# Patient Record
Sex: FEMALE | Race: WHITE | NOT HISPANIC OR LATINO
[De-identification: names, ages, dates, MRNs, and addresses within clinical notes are randomized per-mention and may not be internally consistent; named-entity substitution may affect disease eponyms.]

---

## 2017-03-29 ENCOUNTER — APPOINTMENT (OUTPATIENT)
Dept: SURGICAL ONCOLOGY | Facility: CLINIC | Age: 77
End: 2017-03-29

## 2017-03-29 VITALS
DIASTOLIC BLOOD PRESSURE: 81 MMHG | HEIGHT: 66 IN | SYSTOLIC BLOOD PRESSURE: 130 MMHG | WEIGHT: 134 LBS | RESPIRATION RATE: 14 BRPM | HEART RATE: 77 BPM | BODY MASS INDEX: 21.53 KG/M2

## 2017-03-29 DIAGNOSIS — Z97.3 PRESENCE OF SPECTACLES AND CONTACT LENSES: ICD-10-CM

## 2017-03-29 DIAGNOSIS — Z83.2 FAMILY HISTORY OF DISEASES OF THE BLOOD AND BLOOD-FORMING ORGANS AND CERTAIN DISORDERS INVOLVING THE IMMUNE MECHANISM: ICD-10-CM

## 2017-03-29 DIAGNOSIS — Z85.828 PERSONAL HISTORY OF OTHER MALIGNANT NEOPLASM OF SKIN: ICD-10-CM

## 2017-03-29 DIAGNOSIS — Z80.42 FAMILY HISTORY OF MALIGNANT NEOPLASM OF PROSTATE: ICD-10-CM

## 2017-03-29 DIAGNOSIS — K31.9 DISEASE OF STOMACH AND DUODENUM, UNSPECIFIED: ICD-10-CM

## 2017-03-29 DIAGNOSIS — R59.9 ENLARGED LYMPH NODES, UNSPECIFIED: ICD-10-CM

## 2017-04-11 ENCOUNTER — OUTPATIENT (OUTPATIENT)
Dept: OUTPATIENT SERVICES | Facility: HOSPITAL | Age: 77
LOS: 1 days | End: 2017-04-11
Payer: MEDICARE

## 2017-04-11 VITALS
TEMPERATURE: 98 F | SYSTOLIC BLOOD PRESSURE: 112 MMHG | OXYGEN SATURATION: 98 % | HEART RATE: 82 BPM | RESPIRATION RATE: 14 BRPM | HEIGHT: 66 IN | DIASTOLIC BLOOD PRESSURE: 60 MMHG | WEIGHT: 132.94 LBS

## 2017-04-11 DIAGNOSIS — D27.9 BENIGN NEOPLASM OF UNSPECIFIED OVARY: Chronic | ICD-10-CM

## 2017-04-11 DIAGNOSIS — R19.00 INTRA-ABDOMINAL AND PELVIC SWELLING, MASS AND LUMP, UNSPECIFIED SITE: ICD-10-CM

## 2017-04-11 DIAGNOSIS — Z86.018 PERSONAL HISTORY OF OTHER BENIGN NEOPLASM: Chronic | ICD-10-CM

## 2017-04-11 DIAGNOSIS — D68.51 ACTIVATED PROTEIN C RESISTANCE: ICD-10-CM

## 2017-04-11 LAB
BLD GP AB SCN SERPL QL: NEGATIVE — SIGNIFICANT CHANGE UP
BUN SERPL-MCNC: 28 MG/DL — HIGH (ref 7–23)
CALCIUM SERPL-MCNC: 9.7 MG/DL — SIGNIFICANT CHANGE UP (ref 8.4–10.5)
CHLORIDE SERPL-SCNC: 103 MMOL/L — SIGNIFICANT CHANGE UP (ref 98–107)
CO2 SERPL-SCNC: 27 MMOL/L — SIGNIFICANT CHANGE UP (ref 22–31)
CREAT SERPL-MCNC: 1.04 MG/DL — SIGNIFICANT CHANGE UP (ref 0.5–1.3)
GLUCOSE SERPL-MCNC: 88 MG/DL — SIGNIFICANT CHANGE UP (ref 70–99)
HCT VFR BLD CALC: 36.4 % — SIGNIFICANT CHANGE UP (ref 34.5–45)
HGB BLD-MCNC: 11.5 G/DL — SIGNIFICANT CHANGE UP (ref 11.5–15.5)
MCHC RBC-ENTMCNC: 29.9 PG — SIGNIFICANT CHANGE UP (ref 27–34)
MCHC RBC-ENTMCNC: 31.6 % — LOW (ref 32–36)
MCV RBC AUTO: 94.8 FL — SIGNIFICANT CHANGE UP (ref 80–100)
PLATELET # BLD AUTO: 384 K/UL — SIGNIFICANT CHANGE UP (ref 150–400)
PMV BLD: 9.2 FL — SIGNIFICANT CHANGE UP (ref 7–13)
POTASSIUM SERPL-MCNC: 4.2 MMOL/L — SIGNIFICANT CHANGE UP (ref 3.5–5.3)
POTASSIUM SERPL-SCNC: 4.2 MMOL/L — SIGNIFICANT CHANGE UP (ref 3.5–5.3)
RBC # BLD: 3.84 M/UL — SIGNIFICANT CHANGE UP (ref 3.8–5.2)
RBC # FLD: 13.4 % — SIGNIFICANT CHANGE UP (ref 10.3–14.5)
RH IG SCN BLD-IMP: POSITIVE — SIGNIFICANT CHANGE UP
SODIUM SERPL-SCNC: 144 MMOL/L — SIGNIFICANT CHANGE UP (ref 135–145)
WBC # BLD: 8.47 K/UL — SIGNIFICANT CHANGE UP (ref 3.8–10.5)
WBC # FLD AUTO: 8.47 K/UL — SIGNIFICANT CHANGE UP (ref 3.8–10.5)

## 2017-04-11 PROCEDURE — 93010 ELECTROCARDIOGRAM REPORT: CPT

## 2017-04-11 RX ORDER — SODIUM CHLORIDE 9 MG/ML
1000 INJECTION, SOLUTION INTRAVENOUS
Qty: 0 | Refills: 0 | Status: DISCONTINUED | OUTPATIENT
Start: 2017-04-24 | End: 2017-04-24

## 2017-04-11 RX ORDER — SODIUM CHLORIDE 9 MG/ML
3 INJECTION INTRAMUSCULAR; INTRAVENOUS; SUBCUTANEOUS EVERY 8 HOURS
Qty: 0 | Refills: 0 | Status: DISCONTINUED | OUTPATIENT
Start: 2017-04-24 | End: 2017-04-27

## 2017-04-11 NOTE — H&P PST ADULT - NEGATIVE ENMT SYMPTOMS
no dysphagia/no vertigo/no sinus symptoms/no hearing difficulty/no ear pain/no throat pain/no tinnitus

## 2017-04-11 NOTE — H&P PST ADULT - MALLAMPATI CLASS
with phonation Class III - visualization of the soft palate and the base of the uvula/with phonation

## 2017-04-11 NOTE — H&P PST ADULT - NSANTHOSAYNRD_GEN_A_CORE
No. DORA screening performed.  STOP BANG Legend: 0-2 = LOW Risk; 3-4 = INTERMEDIATE Risk; 5-8 = HIGH Risk

## 2017-04-11 NOTE — H&P PST ADULT - PROBLEM SELECTOR PLAN 1
Exploratory Laparotomy, Resection of Abdominal Mass scheduled on 4/24/17.  Pre-op instructions provided. Pt verbalized understanding.   Pepcid provided for GI prophylaxis.   Chlorhexidine wash provided and instructions given.

## 2017-04-11 NOTE — H&P PST ADULT - PSH
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

## 2017-04-11 NOTE — H&P PST ADULT - OTHER CARE PROVIDERS
Dr. Campos Ricks (heme/onc), Dr. Felder (vascular) Dr. Campos Ricks (heme/onc), Dr. Felder (vascular) [phone]

## 2017-04-11 NOTE — H&P PST ADULT - NEGATIVE MUSCULOSKELETAL SYMPTOMS
no muscle weakness/no stiffness/no muscle cramps/no myalgia/no neck pain/no arthritis/no back pain/no joint swelling

## 2017-04-11 NOTE — H&P PST ADULT - MUSCULOSKELETAL
details… detailed exam no calf tenderness/no joint erythema/no joint swelling/normal strength/ROM intact/no joint warmth

## 2017-04-11 NOTE — H&P PST ADULT - HISTORY OF PRESENT ILLNESS
76 year old female with urinary tract infection in 2/2017 with c/o back pain. Pt had workup including sonogram and CT scan in 3/2017 which revealed intraabdominal mass. Pt presents today for presurgical evaluation for .... 76 year old female with urinary tract infection in 2/2017 with c/o back pain. Pt had workup including sonogram and CT scan in 3/2017 which revealed intraabdominal mass. Incidentally they also found a clot in the left pelvic vein and pt was referred to vascular doctor for evaluation. Pt presents today for presurgical evaluation for Exploratory Laparotomy, Resection of Abdominal Mass scheduled on 4/24/17.

## 2017-04-13 ENCOUNTER — APPOINTMENT (OUTPATIENT)
Dept: MRI IMAGING | Facility: CLINIC | Age: 77
End: 2017-04-13

## 2017-04-13 ENCOUNTER — OUTPATIENT (OUTPATIENT)
Dept: OUTPATIENT SERVICES | Facility: HOSPITAL | Age: 77
LOS: 1 days | End: 2017-04-13
Payer: MEDICARE

## 2017-04-13 DIAGNOSIS — Z86.018 PERSONAL HISTORY OF OTHER BENIGN NEOPLASM: Chronic | ICD-10-CM

## 2017-04-13 DIAGNOSIS — Z00.8 ENCOUNTER FOR OTHER GENERAL EXAMINATION: ICD-10-CM

## 2017-04-13 DIAGNOSIS — R59.9 ENLARGED LYMPH NODES, UNSPECIFIED: ICD-10-CM

## 2017-04-13 DIAGNOSIS — D27.9 BENIGN NEOPLASM OF UNSPECIFIED OVARY: Chronic | ICD-10-CM

## 2017-04-13 PROCEDURE — 72197 MRI PELVIS W/O & W/DYE: CPT

## 2017-04-13 PROCEDURE — 74183 MRI ABD W/O CNTR FLWD CNTR: CPT

## 2017-04-13 PROCEDURE — 74183 MRI ABD W/O CNTR FLWD CNTR: CPT | Mod: 26

## 2017-04-13 PROCEDURE — 82565 ASSAY OF CREATININE: CPT

## 2017-04-13 PROCEDURE — 72197 MRI PELVIS W/O & W/DYE: CPT | Mod: 26

## 2017-04-13 PROCEDURE — A9585: CPT

## 2017-04-23 ENCOUNTER — RESULT REVIEW (OUTPATIENT)
Age: 77
End: 2017-04-23

## 2017-04-24 ENCOUNTER — APPOINTMENT (OUTPATIENT)
Dept: SURGICAL ONCOLOGY | Facility: HOSPITAL | Age: 77
End: 2017-04-24

## 2017-04-24 ENCOUNTER — RESULT REVIEW (OUTPATIENT)
Age: 77
End: 2017-04-24

## 2017-04-24 ENCOUNTER — INPATIENT (INPATIENT)
Facility: HOSPITAL | Age: 77
LOS: 2 days | Discharge: HOME | End: 2017-04-27
Attending: SPECIALIST | Admitting: SPECIALIST
Payer: MEDICARE

## 2017-04-24 VITALS
HEART RATE: 92 BPM | WEIGHT: 132.94 LBS | DIASTOLIC BLOOD PRESSURE: 79 MMHG | HEIGHT: 66 IN | TEMPERATURE: 98 F | SYSTOLIC BLOOD PRESSURE: 142 MMHG | RESPIRATION RATE: 17 BRPM | OXYGEN SATURATION: 100 %

## 2017-04-24 DIAGNOSIS — D27.9 BENIGN NEOPLASM OF UNSPECIFIED OVARY: Chronic | ICD-10-CM

## 2017-04-24 DIAGNOSIS — Z86.018 PERSONAL HISTORY OF OTHER BENIGN NEOPLASM: Chronic | ICD-10-CM

## 2017-04-24 DIAGNOSIS — R19.00 INTRA-ABDOMINAL AND PELVIC SWELLING, MASS AND LUMP, UNSPECIFIED SITE: ICD-10-CM

## 2017-04-24 LAB — RH IG SCN BLD-IMP: POSITIVE — SIGNIFICANT CHANGE UP

## 2017-04-24 PROCEDURE — 88331 PATH CONSLTJ SURG 1 BLK 1SPC: CPT | Mod: 26

## 2017-04-24 PROCEDURE — 88307 TISSUE EXAM BY PATHOLOGIST: CPT | Mod: 26

## 2017-04-24 PROCEDURE — 88305 TISSUE EXAM BY PATHOLOGIST: CPT | Mod: 26

## 2017-04-24 PROCEDURE — 88367 INSITU HYBRIDIZATION AUTO: CPT | Mod: 26

## 2017-04-24 PROCEDURE — 88341 IMHCHEM/IMCYTCHM EA ADD ANTB: CPT | Mod: 26,59

## 2017-04-24 PROCEDURE — 88342 IMHCHEM/IMCYTCHM 1ST ANTB: CPT | Mod: 26,59

## 2017-04-24 PROCEDURE — 88365 INSITU HYBRIDIZATION (FISH): CPT | Mod: 26,59

## 2017-04-24 PROCEDURE — 88360 TUMOR IMMUNOHISTOCHEM/MANUAL: CPT | Mod: 26

## 2017-04-24 PROCEDURE — 88189 FLOWCYTOMETRY/READ 16 & >: CPT

## 2017-04-24 RX ORDER — MORPHINE SULFATE 50 MG/1
2 CAPSULE, EXTENDED RELEASE ORAL EVERY 4 HOURS
Qty: 0 | Refills: 0 | Status: DISCONTINUED | OUTPATIENT
Start: 2017-04-24 | End: 2017-04-26

## 2017-04-24 RX ORDER — ACETAMINOPHEN 500 MG
1000 TABLET ORAL ONCE
Qty: 0 | Refills: 0 | Status: COMPLETED | OUTPATIENT
Start: 2017-04-25 | End: 2017-04-25

## 2017-04-24 RX ORDER — SODIUM CHLORIDE 9 MG/ML
1000 INJECTION, SOLUTION INTRAVENOUS
Qty: 0 | Refills: 0 | Status: DISCONTINUED | OUTPATIENT
Start: 2017-04-24 | End: 2017-04-25

## 2017-04-24 RX ORDER — HEPARIN SODIUM 5000 [USP'U]/ML
5000 INJECTION INTRAVENOUS; SUBCUTANEOUS EVERY 8 HOURS
Qty: 0 | Refills: 0 | Status: DISCONTINUED | OUTPATIENT
Start: 2017-04-24 | End: 2017-04-27

## 2017-04-24 RX ORDER — ONDANSETRON 8 MG/1
4 TABLET, FILM COATED ORAL ONCE
Qty: 0 | Refills: 0 | Status: DISCONTINUED | OUTPATIENT
Start: 2017-04-24 | End: 2017-04-27

## 2017-04-24 RX ORDER — HYDROMORPHONE HYDROCHLORIDE 2 MG/ML
0.5 INJECTION INTRAMUSCULAR; INTRAVENOUS; SUBCUTANEOUS
Qty: 0 | Refills: 0 | Status: DISCONTINUED | OUTPATIENT
Start: 2017-04-24 | End: 2017-04-26

## 2017-04-24 RX ORDER — OXYCODONE HYDROCHLORIDE 5 MG/1
5 TABLET ORAL EVERY 4 HOURS
Qty: 0 | Refills: 0 | Status: DISCONTINUED | OUTPATIENT
Start: 2017-04-24 | End: 2017-04-27

## 2017-04-24 RX ORDER — ACETAMINOPHEN 500 MG
1000 TABLET ORAL ONCE
Qty: 0 | Refills: 0 | Status: COMPLETED | OUTPATIENT
Start: 2017-04-24 | End: 2017-04-24

## 2017-04-24 RX ADMIN — HYDROMORPHONE HYDROCHLORIDE 0.5 MILLIGRAM(S): 2 INJECTION INTRAMUSCULAR; INTRAVENOUS; SUBCUTANEOUS at 11:15

## 2017-04-24 RX ADMIN — HYDROMORPHONE HYDROCHLORIDE 0.5 MILLIGRAM(S): 2 INJECTION INTRAMUSCULAR; INTRAVENOUS; SUBCUTANEOUS at 10:15

## 2017-04-24 RX ADMIN — SODIUM CHLORIDE 75 MILLILITER(S): 9 INJECTION, SOLUTION INTRAVENOUS at 21:00

## 2017-04-24 RX ADMIN — HYDROMORPHONE HYDROCHLORIDE 0.5 MILLIGRAM(S): 2 INJECTION INTRAMUSCULAR; INTRAVENOUS; SUBCUTANEOUS at 11:47

## 2017-04-24 RX ADMIN — HEPARIN SODIUM 5000 UNIT(S): 5000 INJECTION INTRAVENOUS; SUBCUTANEOUS at 18:08

## 2017-04-24 RX ADMIN — Medication 400 MILLIGRAM(S): at 18:08

## 2017-04-24 RX ADMIN — Medication 1000 MILLIGRAM(S): at 19:08

## 2017-04-24 RX ADMIN — HYDROMORPHONE HYDROCHLORIDE 0.5 MILLIGRAM(S): 2 INJECTION INTRAMUSCULAR; INTRAVENOUS; SUBCUTANEOUS at 10:30

## 2017-04-24 RX ADMIN — SODIUM CHLORIDE 75 MILLILITER(S): 9 INJECTION, SOLUTION INTRAVENOUS at 09:50

## 2017-04-24 RX ADMIN — SODIUM CHLORIDE 3 MILLILITER(S): 9 INJECTION INTRAMUSCULAR; INTRAVENOUS; SUBCUTANEOUS at 14:00

## 2017-04-24 RX ADMIN — HYDROMORPHONE HYDROCHLORIDE 0.5 MILLIGRAM(S): 2 INJECTION INTRAMUSCULAR; INTRAVENOUS; SUBCUTANEOUS at 09:55

## 2017-04-24 RX ADMIN — SODIUM CHLORIDE 30 MILLILITER(S): 9 INJECTION, SOLUTION INTRAVENOUS at 06:31

## 2017-04-24 RX ADMIN — SODIUM CHLORIDE 3 MILLILITER(S): 9 INJECTION INTRAMUSCULAR; INTRAVENOUS; SUBCUTANEOUS at 21:05

## 2017-04-24 RX ADMIN — SODIUM CHLORIDE 75 MILLILITER(S): 9 INJECTION, SOLUTION INTRAVENOUS at 16:59

## 2017-04-24 NOTE — BRIEF OPERATIVE NOTE - OPERATION/FINDINGS
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

## 2017-04-24 NOTE — BRIEF OPERATIVE NOTE - PROCEDURE
Exploratory laparotomy  04/24/2017  biopsy of liver nodule, kocherization of duodenum, biopsy of para-aortic lymph node, R oophrectomy  Active  [id number]

## 2017-04-25 ENCOUNTER — TRANSCRIPTION ENCOUNTER (OUTPATIENT)
Age: 77
End: 2017-04-25

## 2017-04-25 LAB
BUN SERPL-MCNC: 12 MG/DL — SIGNIFICANT CHANGE UP (ref 7–23)
CALCIUM SERPL-MCNC: 9.2 MG/DL — SIGNIFICANT CHANGE UP (ref 8.4–10.5)
CHLORIDE SERPL-SCNC: 102 MMOL/L — SIGNIFICANT CHANGE UP (ref 98–107)
CO2 SERPL-SCNC: 26 MMOL/L — SIGNIFICANT CHANGE UP (ref 22–31)
CREAT SERPL-MCNC: 0.86 MG/DL — SIGNIFICANT CHANGE UP (ref 0.5–1.3)
GLUCOSE SERPL-MCNC: 105 MG/DL — HIGH (ref 70–99)
HCT VFR BLD CALC: 35.7 % — SIGNIFICANT CHANGE UP (ref 34.5–45)
HGB BLD-MCNC: 11.5 G/DL — SIGNIFICANT CHANGE UP (ref 11.5–15.5)
MAGNESIUM SERPL-MCNC: 1.8 MG/DL — SIGNIFICANT CHANGE UP (ref 1.6–2.6)
MCHC RBC-ENTMCNC: 29.9 PG — SIGNIFICANT CHANGE UP (ref 27–34)
MCHC RBC-ENTMCNC: 32.2 % — SIGNIFICANT CHANGE UP (ref 32–36)
MCV RBC AUTO: 93 FL — SIGNIFICANT CHANGE UP (ref 80–100)
PHOSPHATE SERPL-MCNC: 3.5 MG/DL — SIGNIFICANT CHANGE UP (ref 2.5–4.5)
PLATELET # BLD AUTO: 259 K/UL — SIGNIFICANT CHANGE UP (ref 150–400)
PMV BLD: 9.8 FL — SIGNIFICANT CHANGE UP (ref 7–13)
POTASSIUM SERPL-MCNC: 4.5 MMOL/L — SIGNIFICANT CHANGE UP (ref 3.5–5.3)
POTASSIUM SERPL-SCNC: 4.5 MMOL/L — SIGNIFICANT CHANGE UP (ref 3.5–5.3)
RBC # BLD: 3.84 M/UL — SIGNIFICANT CHANGE UP (ref 3.8–5.2)
RBC # FLD: 13.5 % — SIGNIFICANT CHANGE UP (ref 10.3–14.5)
SODIUM SERPL-SCNC: 143 MMOL/L — SIGNIFICANT CHANGE UP (ref 135–145)
WBC # BLD: 10.72 K/UL — HIGH (ref 3.8–10.5)
WBC # FLD AUTO: 10.72 K/UL — HIGH (ref 3.8–10.5)

## 2017-04-25 RX ORDER — MAGNESIUM SULFATE 500 MG/ML
1 VIAL (ML) INJECTION ONCE
Qty: 0 | Refills: 0 | Status: COMPLETED | OUTPATIENT
Start: 2017-04-25 | End: 2017-04-25

## 2017-04-25 RX ADMIN — OXYCODONE HYDROCHLORIDE 5 MILLIGRAM(S): 5 TABLET ORAL at 08:31

## 2017-04-25 RX ADMIN — HEPARIN SODIUM 5000 UNIT(S): 5000 INJECTION INTRAVENOUS; SUBCUTANEOUS at 00:00

## 2017-04-25 RX ADMIN — SODIUM CHLORIDE 3 MILLILITER(S): 9 INJECTION INTRAMUSCULAR; INTRAVENOUS; SUBCUTANEOUS at 05:06

## 2017-04-25 RX ADMIN — HEPARIN SODIUM 5000 UNIT(S): 5000 INJECTION INTRAVENOUS; SUBCUTANEOUS at 08:32

## 2017-04-25 RX ADMIN — OXYCODONE HYDROCHLORIDE 5 MILLIGRAM(S): 5 TABLET ORAL at 18:35

## 2017-04-25 RX ADMIN — Medication 100 GRAM(S): at 09:42

## 2017-04-25 RX ADMIN — HEPARIN SODIUM 5000 UNIT(S): 5000 INJECTION INTRAVENOUS; SUBCUTANEOUS at 16:34

## 2017-04-25 RX ADMIN — OXYCODONE HYDROCHLORIDE 5 MILLIGRAM(S): 5 TABLET ORAL at 18:57

## 2017-04-25 RX ADMIN — SODIUM CHLORIDE 3 MILLILITER(S): 9 INJECTION INTRAMUSCULAR; INTRAVENOUS; SUBCUTANEOUS at 15:10

## 2017-04-25 RX ADMIN — OXYCODONE HYDROCHLORIDE 5 MILLIGRAM(S): 5 TABLET ORAL at 09:00

## 2017-04-25 NOTE — DISCHARGE NOTE ADULT - CARE PROVIDERS DIRECT ADDRESSES
[email or web address]

## 2017-04-25 NOTE — DISCHARGE NOTE ADULT - CARE PROVIDER_API CALL
Luis Pope (MD), Surgery  [address] [address] [place] [address]  Phone: [phone]  Fax: [phone]

## 2017-04-25 NOTE — DISCHARGE NOTE ADULT - PATIENT PORTAL LINK FT
“You can access the FollowHealth Patient Portal, offered by [facility], by registering with the following website: [email or web address]”

## 2017-04-25 NOTE — DISCHARGE NOTE ADULT - CONDITIONS AT DISCHARGE
Patient is alert and orientedX4. Pt denies pain or sob. No s/s of distress noted.  patient is d/c home with no needs at this time. IV removed.

## 2017-04-25 NOTE — DISCHARGE NOTE ADULT - MEDICATION SUMMARY - MEDICATIONS TO TAKE
I will START or STAY ON the medications listed below when I get home from the hospital:    oxyCODONE 5 mg oral tablet  -- 1 tab(s) by mouth every 4-6 hours, As needed, Moderate Pain (4 - 6) MDD:6tabs  -- Indication: For Pain

## 2017-04-25 NOTE — DISCHARGE NOTE ADULT - HOSPITAL COURSE
76 year old female with urinary tract infection in 2/2017 with c/o back pain. Pt had workup including sonogram and CT scan in 3/2017 which revealed intraabdominal mass. Patient presented for an exploratory laparotomy with  biopsy of liver nodule, kocherization of duodenum, biopsy of para-aortic lymph node and R oophrectomy 4/24/17 with Dr. Pope. The procedure was without complication and patient went to was transferred to the surgical floor post recovery room. Her diet was advanced and tolerated. Her pain has been controlled on PO pain medication. She is now ambulating, voiding, passing flatus, and hemodynamically stable for discharge home. She is to follow up with Dr. Pope within one week of discharge home. 76 year old female with urinary tract infection in 2/2017 with c/o back pain. Pt had workup including sonogram and CT scan in 3/2017 which revealed intraabdominal mass. Patient presented for an exploratory laparotomy with  biopsy of liver nodule, kocherization of duodenum, biopsy of para-aortic lymph node and R oophrectomy 4/24/17 with Dr. Pope. The procedure was without complication and patient was transferred to the surgical floor post recovery room. Her diet was advanced and tolerated. Her pain has been controlled on PO pain medication. She is now ambulating, voiding, passing flatus, and hemodynamically stable for discharge home. She is to follow up with Dr. Pope within one week of discharge home. 76 year old female with urinary tract infection in 2/2017 with c/o back pain. Pt had workup including sonogram and CT scan in 3/2017 which revealed intraabdominal mass. Patient presented for an exploratory laparotomy with  biopsy of liver nodule, kocherization of duodenum, biopsy of para-aortic lymph node and R oophrectomy 4/24/17 with Dr. Pope. The procedure was without complication and patient was transferred to the surgical floor post recovery room. Her diet was advanced and tolerated. Her pain has been controlled on PO pain medication. She is now ambulating, voiding, passing flatus, and hemodynamically stable for discharge home. She is to follow up with Dr. Pope within one week of discharge home.     iSTOP Reference #: [id number]

## 2017-04-25 NOTE — DISCHARGE NOTE ADULT - CARE PLAN
Goal:	Recovery  Instructions for follow-up, activity and diet:	WOUND CARE:  Please keep incisions clean and dry. Please do not Scrub or rub incisions. Do not use lotion or powder on incisions  BATHING: Please do not submerge wound underwater. You may shower and/or sponge bathe.  ACTIVITY: No heavy lifting or straining. Otherwise, you may return to your usual level of physical activity. If you are taking narcotic pain medication (such as Percocet) DO NOT drive a car, operate machinery or make important decisions.  DIET: Return to your usual diet.  NOTIFY YOUR SURGEON IF: You have any bleeding that does not stop, any pus draining from your wound(s), any fever (over 100.4 F) or chills, persistent nausea/vomiting, persistent diarrhea, or if your pain is not controlled on your discharge pain medications.  FOLLOW-UP: Please follow up with your primary care physician in one week regarding your hospitalization.  Please follow up with your surgeon, Dr. Pope, within one week of discharge from the hospital. Goal:	Recovery  Instructions for follow-up, activity and diet:	WOUND CARE:  Please keep incisions clean and dry. Please do not Scrub or rub incisions. Do not use lotion or powder on incisions. Staples will be taken out in office during your follow-up appointment.   BATHING: Please do not submerge wound underwater. You may shower and/or sponge bathe.  ACTIVITY: No heavy lifting or straining. Otherwise, you may return to your usual level of physical activity. If you are taking narcotic pain medication (such as Percocet) DO NOT drive a car, operate machinery or make important decisions.  DIET: Return to your usual diet.  NOTIFY YOUR SURGEON IF: You have any bleeding that does not stop, any pus draining from your wound(s), any fever (over 100.4 F) or chills, persistent nausea/vomiting, persistent diarrhea, or if your pain is not controlled on your discharge pain medications.  FOLLOW-UP: Please follow up with your primary care physician in one week regarding your hospitalization.  Please follow up with your surgeon, Dr. Pope, within one week of discharge from the hospital. Principal Discharge DX:	Intra-abdominal and pelvic swelling of mass or lump  Goal:	Wound healing  Instructions for follow-up, activity and diet:	WOUND CARE:  Please keep incisions clean and dry. Please do not Scrub or rub incisions. Do not use lotion or powder on incisions. Staples will be taken out in office during your follow-up appointment.   BATHING: Please do not submerge wound underwater. You may shower and/or sponge bathe.  ACTIVITY: No heavy lifting or straining. Otherwise, you may return to your usual level of physical activity. If you are taking narcotic pain medication (such as Percocet) DO NOT drive a car, operate machinery or make important decisions.  DIET: Return to your usual diet.  NOTIFY YOUR SURGEON IF: You have any bleeding that does not stop, any pus draining from your wound(s), any fever (over 100.4 F) or chills, persistent nausea/vomiting, persistent diarrhea, or if your pain is not controlled on your discharge pain medications.  FOLLOW-UP: Please follow up with your primary care physician in one week regarding your hospitalization.  Please follow up with your surgeon, Dr. Pope, within one week of discharge from the hospital.

## 2017-04-25 NOTE — DISCHARGE NOTE ADULT - PLAN OF CARE
Recovery WOUND CARE:  Please keep incisions clean and dry. Please do not Scrub or rub incisions. Do not use lotion or powder on incisions  BATHING: Please do not submerge wound underwater. You may shower and/or sponge bathe.  ACTIVITY: No heavy lifting or straining. Otherwise, you may return to your usual level of physical activity. If you are taking narcotic pain medication (such as Percocet) DO NOT drive a car, operate machinery or make important decisions.  DIET: Return to your usual diet.  NOTIFY YOUR SURGEON IF: You have any bleeding that does not stop, any pus draining from your wound(s), any fever (over 100.4 F) or chills, persistent nausea/vomiting, persistent diarrhea, or if your pain is not controlled on your discharge pain medications.  FOLLOW-UP: Please follow up with your primary care physician in one week regarding your hospitalization.  Please follow up with your surgeon, Dr. Pope, within one week of discharge from the hospital. WOUND CARE:  Please keep incisions clean and dry. Please do not Scrub or rub incisions. Do not use lotion or powder on incisions. Staples will be taken out in office during your follow-up appointment.   BATHING: Please do not submerge wound underwater. You may shower and/or sponge bathe.  ACTIVITY: No heavy lifting or straining. Otherwise, you may return to your usual level of physical activity. If you are taking narcotic pain medication (such as Percocet) DO NOT drive a car, operate machinery or make important decisions.  DIET: Return to your usual diet.  NOTIFY YOUR SURGEON IF: You have any bleeding that does not stop, any pus draining from your wound(s), any fever (over 100.4 F) or chills, persistent nausea/vomiting, persistent diarrhea, or if your pain is not controlled on your discharge pain medications.  FOLLOW-UP: Please follow up with your primary care physician in one week regarding your hospitalization.  Please follow up with your surgeon, Dr. Pope, within one week of discharge from the hospital. Wound healing

## 2017-04-26 RX ORDER — BENZOCAINE AND MENTHOL 5; 1 G/100ML; G/100ML
1 LIQUID ORAL
Qty: 0 | Refills: 0 | Status: DISCONTINUED | OUTPATIENT
Start: 2017-04-26 | End: 2017-04-27

## 2017-04-26 RX ORDER — OXYCODONE HYDROCHLORIDE 5 MG/1
10 TABLET ORAL EVERY 4 HOURS
Qty: 0 | Refills: 0 | Status: DISCONTINUED | OUTPATIENT
Start: 2017-04-26 | End: 2017-04-27

## 2017-04-26 RX ADMIN — SODIUM CHLORIDE 3 MILLILITER(S): 9 INJECTION INTRAMUSCULAR; INTRAVENOUS; SUBCUTANEOUS at 00:36

## 2017-04-26 RX ADMIN — SODIUM CHLORIDE 3 MILLILITER(S): 9 INJECTION INTRAMUSCULAR; INTRAVENOUS; SUBCUTANEOUS at 06:22

## 2017-04-26 RX ADMIN — BENZOCAINE AND MENTHOL 1 LOZENGE: 5; 1 LIQUID ORAL at 21:36

## 2017-04-26 RX ADMIN — HEPARIN SODIUM 5000 UNIT(S): 5000 INJECTION INTRAVENOUS; SUBCUTANEOUS at 00:37

## 2017-04-26 RX ADMIN — SODIUM CHLORIDE 3 MILLILITER(S): 9 INJECTION INTRAMUSCULAR; INTRAVENOUS; SUBCUTANEOUS at 13:25

## 2017-04-26 RX ADMIN — HEPARIN SODIUM 5000 UNIT(S): 5000 INJECTION INTRAVENOUS; SUBCUTANEOUS at 13:24

## 2017-04-26 RX ADMIN — HEPARIN SODIUM 5000 UNIT(S): 5000 INJECTION INTRAVENOUS; SUBCUTANEOUS at 23:35

## 2017-04-26 RX ADMIN — HEPARIN SODIUM 5000 UNIT(S): 5000 INJECTION INTRAVENOUS; SUBCUTANEOUS at 06:51

## 2017-04-26 RX ADMIN — SODIUM CHLORIDE 3 MILLILITER(S): 9 INJECTION INTRAMUSCULAR; INTRAVENOUS; SUBCUTANEOUS at 21:36

## 2017-04-27 VITALS
RESPIRATION RATE: 18 BRPM | HEART RATE: 92 BPM | DIASTOLIC BLOOD PRESSURE: 58 MMHG | SYSTOLIC BLOOD PRESSURE: 122 MMHG | TEMPERATURE: 98 F | OXYGEN SATURATION: 97 %

## 2017-04-27 LAB — TM INTERPRETATION: SIGNIFICANT CHANGE UP

## 2017-04-27 PROCEDURE — 99238 HOSP IP/OBS DSCHRG MGMT 30/<: CPT | Mod: 24

## 2017-04-27 RX ORDER — OXYCODONE HYDROCHLORIDE 5 MG/1
1 TABLET ORAL
Qty: 18 | Refills: 0 | OUTPATIENT
Start: 2017-04-27 | End: 2017-04-30

## 2017-04-27 RX ADMIN — SODIUM CHLORIDE 3 MILLILITER(S): 9 INJECTION INTRAMUSCULAR; INTRAVENOUS; SUBCUTANEOUS at 06:57

## 2017-04-27 RX ADMIN — HEPARIN SODIUM 5000 UNIT(S): 5000 INJECTION INTRAVENOUS; SUBCUTANEOUS at 06:58

## 2017-05-03 LAB — HEMATOPATHOLOGY REPORT: SIGNIFICANT CHANGE UP

## 2017-05-08 ENCOUNTER — APPOINTMENT (OUTPATIENT)
Dept: SURGICAL ONCOLOGY | Facility: CLINIC | Age: 77
End: 2017-05-08

## 2017-05-08 VITALS
BODY MASS INDEX: 20.73 KG/M2 | HEIGHT: 66 IN | DIASTOLIC BLOOD PRESSURE: 79 MMHG | SYSTOLIC BLOOD PRESSURE: 130 MMHG | WEIGHT: 129 LBS

## 2017-05-08 DIAGNOSIS — Z09 ENCOUNTER FOR FOLLOW-UP EXAMINATION AFTER COMPLETED TREATMENT FOR CONDITIONS OTHER THAN MALIGNANT NEOPLASM: ICD-10-CM

## 2017-05-08 DIAGNOSIS — C85.10 UNSPECIFIED B-CELL LYMPHOMA, UNSPECIFIED SITE: ICD-10-CM

## 2017-05-10 ENCOUNTER — APPOINTMENT (OUTPATIENT)
Dept: NUCLEAR MEDICINE | Facility: IMAGING CENTER | Age: 77
End: 2017-05-10

## 2017-05-10 ENCOUNTER — APPOINTMENT (OUTPATIENT)
Age: 77
End: 2017-05-10

## 2017-05-10 ENCOUNTER — OUTPATIENT (OUTPATIENT)
Dept: OUTPATIENT SERVICES | Facility: HOSPITAL | Age: 77
LOS: 1 days | End: 2017-05-10
Payer: MEDICARE

## 2017-05-10 DIAGNOSIS — Z00.8 ENCOUNTER FOR OTHER GENERAL EXAMINATION: ICD-10-CM

## 2017-05-10 DIAGNOSIS — Z86.018 PERSONAL HISTORY OF OTHER BENIGN NEOPLASM: Chronic | ICD-10-CM

## 2017-05-10 DIAGNOSIS — D27.9 BENIGN NEOPLASM OF UNSPECIFIED OVARY: Chronic | ICD-10-CM

## 2017-05-10 PROCEDURE — A9552: CPT

## 2017-05-10 PROCEDURE — 78815 PET IMAGE W/CT SKULL-THIGH: CPT

## 2017-05-16 ENCOUNTER — INPATIENT (INPATIENT)
Facility: HOSPITAL | Age: 77
LOS: 6 days | Discharge: HOME | DRG: 847 | End: 2017-05-23
Attending: INTERNAL MEDICINE | Admitting: INTERNAL MEDICINE
Payer: MEDICARE

## 2017-05-16 VITALS
DIASTOLIC BLOOD PRESSURE: 74 MMHG | HEART RATE: 121 BPM | SYSTOLIC BLOOD PRESSURE: 115 MMHG | TEMPERATURE: 100 F | OXYGEN SATURATION: 94 %

## 2017-05-16 DIAGNOSIS — Z29.9 ENCOUNTER FOR PROPHYLACTIC MEASURES, UNSPECIFIED: ICD-10-CM

## 2017-05-16 DIAGNOSIS — Z98.890 OTHER SPECIFIED POSTPROCEDURAL STATES: Chronic | ICD-10-CM

## 2017-05-16 DIAGNOSIS — C85.90 NON-HODGKIN LYMPHOMA, UNSPECIFIED, UNSPECIFIED SITE: ICD-10-CM

## 2017-05-16 DIAGNOSIS — J90 PLEURAL EFFUSION, NOT ELSEWHERE CLASSIFIED: ICD-10-CM

## 2017-05-16 DIAGNOSIS — D27.9 BENIGN NEOPLASM OF UNSPECIFIED OVARY: Chronic | ICD-10-CM

## 2017-05-16 DIAGNOSIS — C83.30 DIFFUSE LARGE B-CELL LYMPHOMA, UNSPECIFIED SITE: ICD-10-CM

## 2017-05-16 DIAGNOSIS — Z86.018 PERSONAL HISTORY OF OTHER BENIGN NEOPLASM: Chronic | ICD-10-CM

## 2017-05-16 LAB
APPEARANCE UR: CLEAR — SIGNIFICANT CHANGE UP
APTT BLD: 25.3 SEC — LOW (ref 27.5–37.4)
BASOPHILS # BLD AUTO: 0 K/UL — SIGNIFICANT CHANGE UP (ref 0–0.2)
BASOPHILS NFR BLD AUTO: 0.1 % — SIGNIFICANT CHANGE UP (ref 0–2)
BILIRUB UR-MCNC: NEGATIVE — SIGNIFICANT CHANGE UP
COLOR SPEC: YELLOW — SIGNIFICANT CHANGE UP
DIFF PNL FLD: ABNORMAL
EOSINOPHIL # BLD AUTO: 0 K/UL — SIGNIFICANT CHANGE UP (ref 0–0.5)
EOSINOPHIL NFR BLD AUTO: 0.2 % — SIGNIFICANT CHANGE UP (ref 0–6)
EPI CELLS # UR: SIGNIFICANT CHANGE UP /HPF
GAS PNL BLDV: SIGNIFICANT CHANGE UP
GLUCOSE UR QL: NEGATIVE — SIGNIFICANT CHANGE UP
HCT VFR BLD CALC: 35.4 % — SIGNIFICANT CHANGE UP (ref 34.5–45)
HGB BLD-MCNC: 11.6 G/DL — SIGNIFICANT CHANGE UP (ref 11.5–15.5)
INR BLD: 1.15 RATIO — SIGNIFICANT CHANGE UP (ref 0.88–1.16)
KETONES UR-MCNC: NEGATIVE — SIGNIFICANT CHANGE UP
LEUKOCYTE ESTERASE UR-ACNC: ABNORMAL
LYMPHOCYTES # BLD AUTO: 1.5 K/UL — SIGNIFICANT CHANGE UP (ref 1–3.3)
LYMPHOCYTES # BLD AUTO: 8.7 % — LOW (ref 13–44)
MCHC RBC-ENTMCNC: 30.2 PG — SIGNIFICANT CHANGE UP (ref 27–34)
MCHC RBC-ENTMCNC: 32.7 GM/DL — SIGNIFICANT CHANGE UP (ref 32–36)
MCV RBC AUTO: 92.3 FL — SIGNIFICANT CHANGE UP (ref 80–100)
MONOCYTES # BLD AUTO: 1.7 K/UL — HIGH (ref 0–0.9)
MONOCYTES NFR BLD AUTO: 10 % — SIGNIFICANT CHANGE UP (ref 2–14)
NEUTROPHILS # BLD AUTO: 13.6 K/UL — HIGH (ref 1.8–7.4)
NEUTROPHILS NFR BLD AUTO: 81.1 % — HIGH (ref 43–77)
NITRITE UR-MCNC: NEGATIVE — SIGNIFICANT CHANGE UP
PH UR: 6 — SIGNIFICANT CHANGE UP (ref 5–8)
PLATELET # BLD AUTO: 434 K/UL — HIGH (ref 150–400)
PROT UR-MCNC: 30 MG/DL
PROTHROM AB SERPL-ACNC: 12.6 SEC — SIGNIFICANT CHANGE UP (ref 9.8–12.7)
RBC # BLD: 3.83 M/UL — SIGNIFICANT CHANGE UP (ref 3.8–5.2)
RBC # FLD: 13.1 % — SIGNIFICANT CHANGE UP (ref 10.3–14.5)
RBC CASTS # UR COMP ASSIST: SIGNIFICANT CHANGE UP /HPF (ref 0–2)
SP GR SPEC: 1.02 — SIGNIFICANT CHANGE UP (ref 1.01–1.02)
UROBILINOGEN FLD QL: NEGATIVE — SIGNIFICANT CHANGE UP
WBC # BLD: 16.8 K/UL — HIGH (ref 3.8–10.5)
WBC # FLD AUTO: 16.8 K/UL — HIGH (ref 3.8–10.5)
WBC UR QL: SIGNIFICANT CHANGE UP /HPF (ref 0–5)

## 2017-05-16 PROCEDURE — 99223 1ST HOSP IP/OBS HIGH 75: CPT | Mod: AI

## 2017-05-16 PROCEDURE — 93010 ELECTROCARDIOGRAM REPORT: CPT

## 2017-05-16 PROCEDURE — 99285 EMERGENCY DEPT VISIT HI MDM: CPT | Mod: 25

## 2017-05-16 PROCEDURE — 71010: CPT | Mod: 26

## 2017-05-16 NOTE — H&P ADULT. - RADIOLOGY RESULTS AND INTERPRETATION
CXR personally reviewed: large L sided pleural effusion w/ adjacent atelectasis    CT of Abdomen/Pelvis done on 3/16/17 showed bulky abnormal tissue in the upper abdominal retrocrural region, extending posterior to the upper abdominal aorta, measuring up to 3.7 cm in AP diameter; likely represents lymphadenopathy. There are additional mildly enlarged periaortic and interaortocaval lymph nodes noted more inferiorly.  Pelvic Ultrasound done on 3/22/17 showed a 6.8 x 3.9 x 4.6 cm complex cyst RIGHT adnexal mass with appearance suggesting a recurrent dermoid. There is a part that is simple in appearance measuring 4.7 x 2.7 x 3.6 cm. Incidentally noted appears to be a clot in LEFT pelvic veins in the LEFT adnexa.    PET 5/10/17  1. Hypermetabolic lymphadenopathy in neck, thorax, abdomen, and pelvis is compatible with diffuse large B-cell lymphoma.  2. Hypermetabolic bilateral pleural lesions are compatible with lymphoma.  3. Hypermetabolic foci in mandible and several ribs, without corresponding abnormalities on CT, are suspicious for marrow involvement by lymphoma. CXR personally reviewed: L sided pleural effusion w/ adjacent atelectasis    CT of Abdomen/Pelvis 3/16/17 showed bulky abnormal tissue in the upper abdominal retrocrural region, extending posterior to the upper abdominal aorta, measuring up to 3.7 cm in AP diameter; likely represents lymphadenopathy. There are additional mildly enlarged periaortic and interaortocaval lymph nodes noted more inferiorly.    Pelvic Ultrasound 3/22/17 showed a 6.8 x 3.9 x 4.6 cm complex cyst RIGHT adnexal mass with appearance suggesting a recurrent dermoid. There is a part that is simple in appearance measuring 4.7 x 2.7 x 3.6 cm. Incidentally noted appears to be a clot in LEFT pelvic veins in the LEFT adnexa.    PET 5/10/17  1. Hypermetabolic lymphadenopathy in neck, thorax, abdomen, and pelvis is compatible with diffuse large B-cell lymphoma.  2. Hypermetabolic bilateral pleural lesions are compatible with lymphoma.  3. Hypermetabolic foci in mandible and several ribs, without corresponding abnormalities on CT, are suspicious for marrow involvement by lymphoma.

## 2017-05-16 NOTE — ED PROVIDER NOTE - OBJECTIVE STATEMENT
76yof recent diagnosis 76yof recent diagnosis aggressive B-Cell Lymphoma, not yet on chemo with recent abd surgery for biopsy, sent in from Dr. Ricks for further workup for new mandible mass along with new lymph nodes and to start chemo stat. Pt reports one episode of fever in the office and given a dose of augmentin. Bloodwork done in the office but pt does not have any results with her. Pt is the mother of ENT attending Dr. Betts and needs admission to [address] [place] [address].  Pt reports runny nose but denies any cough, chest pain, abd pain, NO urinary symx, no rash. No vomiting or diarrhea. No complaints other than lower jaw numbness and slight swelling.

## 2017-05-16 NOTE — H&P ADULT. - FAMILY HISTORY
Sibling  Still living? Unknown  Family history of prostate cancer, Age at diagnosis: Age Unknown  Family history of factor V Leiden mutation, Age at diagnosis: Age Unknown

## 2017-05-16 NOTE — H&P ADULT. - PROBLEM SELECTOR PLAN 2
In setting of newly diagnosed malignancy.  -consult pulm for thoracentesis Subjective sensation of swelling, not noted on exam. no evidence of oral infection. symptoms likely 2/2 lymphoma as noted on PET scan.  -would not continue augmentin for infection

## 2017-05-16 NOTE — ED ADULT NURSE NOTE - OBJECTIVE STATEMENT
76 y.o F arrived to ED as per PMD - Dr Ricks instruction. A&Ox3, ambulatory. Pt  had recent abdominal surgery for biopsy, states recently diagnosed with B cell lymphoma, not yet on chemo. Today at PMD pt had a fever and has had new onset numbness to L jaw. Temp 99.9 upon arrival to ED. Pt reports PMD wants to begin chemo stat. Pt daughter Dr. Betts is ENT attending here, pt to be admitted to [facility]. Respirations CTA B/L, abdomen soft nontender, neuro intact. Denies CP, SOB, N/V/D, fevers, chills, HA, dizziness, vision changes.

## 2017-05-16 NOTE — H&P ADULT. - HISTORY OF PRESENT ILLNESS
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    Recent admission 4/17 diagnosed w/ diffuse large b cell lymphoma based on path from biopsies during laparoscopic oophorectomy.    ED VS: Tmax: 99.9, BP: 115/74, P: 121, O2: 94% on RA  ED meds:    Admission 4/27/17: admitted for back pain. CT A/P showed intraabdominal mass. s/p exploratory laparotomy w/ liver nodule bx, kocherization of duodenum, bx of para-aortic LN, R oophorectomy (4/24/17). 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 per the patient and family, she was recently diagnosed with diffuse large b cell lymphoma with a exploratory laparotomy. she has been following w/ oncology outpatient. around 2 weeks ago she noticed some jaw numbness and swelling which has progressed over the past 2 weeks. denies paresthesias, dysarthria, dysphagia, headache, blurry vision, cp, sob, cough, night sweats, diarrhea, nausea, vomiting. she went to see her oncologist today who recommended she come in to initiate chemotherapy. due to concern for a possible tooth infection the patient was started on augmentin by her pmd which she started today. denies tooth pain, discharge, jaw pain, fevers. notes that in the office today at her oncologist she had a temp of 100.0, per her daughter she has been tachycardic for the past 2-3 weeks. due to progression of disease with increasing sensation of jaw swelling the patient was sent in for CTX initiation.    Recent admission 4/17 diagnosed w/ diffuse large b cell lymphoma based on path from biopsies during laparoscopic oophorectomy.    ED VS: Tmax: 99.9, BP: 115/74, P: 121, O2: 94% on RA  ED meds: no meds given    Admission 4/27/17: admitted for back pain. CT A/P showed intraabdominal mass. s/p exploratory laparotomy w/ liver nodule bx, kocherization of duodenum, bx of para-aortic LN, R oophorectomy (4/24/17).

## 2017-05-16 NOTE — H&P ADULT. - PROBLEM SELECTOR PLAN 4
lovenox  care discussed w/ np  dr nam to assume care in am  daughter dr leno gomez (ent attg @ [facility]) [phone]

## 2017-05-16 NOTE — H&P ADULT. - PROBLEM SELECTOR PLAN 3
lovenox lovenox  care discussed w/ np  dr nam to assume care in am Likely 2/2 known DLBCL. currently satting comfortably on room air.  -start treatment for underlying malignancy  -consider pulm consult for thoracentesis

## 2017-05-16 NOTE — H&P ADULT. - PROBLEM SELECTOR PLAN 1
-Neurology consulted for possible LP to r/o LM disease, to see in am per ed records Admitting for chemotherapy initiation due to concern for rapid progression of disease  -Oncology eval - Dr Ricks - re: chemotherapy initiation  -c/w allpurinol bid as started outpatient  -to be started on prophylactic meds per onc recs  -Neurology consulted for possible LP to r/o LM disease, brain mri to further stage disease; per ed records to see in am

## 2017-05-16 NOTE — H&P ADULT. - PSH
Benign ovarian tumor  removed 50 yrs ago  H/O exploratory laparotomy    H/O lipoma  s/p excision from left upper back

## 2017-05-16 NOTE — ED PROVIDER NOTE - MEDICAL DECISION MAKING DETAILS
76yof recent diagnosis aggressive B-Cell Lymphoma, not yet on chemo with recent abd surgery for biopsy, sent in from Dr. Ricks for further workup for new mandible mass along with new lymph nodes and to start chemo stat. Pt reports one episode of fever in the office and given a dose of augmentin. Labs, EKG, VBG, Lactate, CXR, IVF, IV abxs, Hem consult, admit, reassess

## 2017-05-16 NOTE — H&P ADULT. - PMH
Basal cell carcinoma  L cheek  Dermoid cyst of ovary, right    Diffuse large B-cell lymphoma, unspecified body region    Factor V Leiden    Lipoma  L posterior neck mass, 6/16

## 2017-05-16 NOTE — ED PROVIDER NOTE - PROGRESS NOTE DETAILS
Spoke to Dr. Wolff and was told by Dr. Ricks to obtain shea culture, bloodwork, ekg, chest xray and Dr. Sifuentes for neurology consult for possible MRI and LP for leptomeningeal disease. Spoke to pt and Dr. Betts at bedside and made aware that we need to start an IV and get blood along with cultures, ekg, chest xray. Paged Dr. Sifuentes group along with Neuro resident. All this discussed with Dr. Betts at bedside. Liz. SHERITA Jimenez spoke to Neurology resident who spoke to Dr. Mahoney and pt will be seen by the private neurology group in the AM and can cont with the medical workup -ALLISON Jimenez

## 2017-05-16 NOTE — H&P ADULT. - ASSESSMENT
76F hx recently diagnosed B cell lymphoma (4/17, not yet on CTX) presenting from outpt office to start chemo.

## 2017-05-17 DIAGNOSIS — R22.0 LOCALIZED SWELLING, MASS AND LUMP, HEAD: ICD-10-CM

## 2017-05-17 LAB
ALBUMIN SERPL ELPH-MCNC: 2.9 G/DL — LOW (ref 3.3–5)
ALBUMIN SERPL ELPH-MCNC: 3.7 G/DL — SIGNIFICANT CHANGE UP (ref 3.3–5)
ALP SERPL-CCNC: 63 U/L — SIGNIFICANT CHANGE UP (ref 40–120)
ALP SERPL-CCNC: 70 U/L — SIGNIFICANT CHANGE UP (ref 40–120)
ALT FLD-CCNC: 10 U/L — SIGNIFICANT CHANGE UP (ref 10–45)
ALT FLD-CCNC: 14 U/L RC — SIGNIFICANT CHANGE UP (ref 10–45)
ANION GAP SERPL CALC-SCNC: 15 MMOL/L — SIGNIFICANT CHANGE UP (ref 5–17)
ANION GAP SERPL CALC-SCNC: 16 MMOL/L — SIGNIFICANT CHANGE UP (ref 5–17)
AST SERPL-CCNC: 22 U/L — SIGNIFICANT CHANGE UP (ref 10–40)
AST SERPL-CCNC: 24 U/L — SIGNIFICANT CHANGE UP (ref 10–40)
BASOPHILS # BLD AUTO: 0.01 K/UL — SIGNIFICANT CHANGE UP (ref 0–0.2)
BASOPHILS NFR BLD AUTO: 0.1 % — SIGNIFICANT CHANGE UP (ref 0–2)
BILIRUB DIRECT SERPL-MCNC: 0.1 MG/DL — SIGNIFICANT CHANGE UP (ref 0–0.2)
BILIRUB INDIRECT FLD-MCNC: 0.6 MG/DL — SIGNIFICANT CHANGE UP (ref 0.2–1)
BILIRUB SERPL-MCNC: 0.6 MG/DL — SIGNIFICANT CHANGE UP (ref 0.2–1.2)
BILIRUB SERPL-MCNC: 0.7 MG/DL — SIGNIFICANT CHANGE UP (ref 0.2–1.2)
BUN SERPL-MCNC: 15 MG/DL — SIGNIFICANT CHANGE UP (ref 7–23)
BUN SERPL-MCNC: 22 MG/DL — SIGNIFICANT CHANGE UP (ref 7–23)
CALCIUM SERPL-MCNC: 8.8 MG/DL — SIGNIFICANT CHANGE UP (ref 8.4–10.5)
CALCIUM SERPL-MCNC: 9.5 MG/DL — SIGNIFICANT CHANGE UP (ref 8.4–10.5)
CHLORIDE SERPL-SCNC: 97 MMOL/L — SIGNIFICANT CHANGE UP (ref 96–108)
CHLORIDE SERPL-SCNC: 99 MMOL/L — SIGNIFICANT CHANGE UP (ref 96–108)
CO2 SERPL-SCNC: 22 MMOL/L — SIGNIFICANT CHANGE UP (ref 22–31)
CO2 SERPL-SCNC: 24 MMOL/L — SIGNIFICANT CHANGE UP (ref 22–31)
CREAT SERPL-MCNC: 0.79 MG/DL — SIGNIFICANT CHANGE UP (ref 0.5–1.3)
CREAT SERPL-MCNC: 0.99 MG/DL — SIGNIFICANT CHANGE UP (ref 0.5–1.3)
EOSINOPHIL # BLD AUTO: 0.01 K/UL — SIGNIFICANT CHANGE UP (ref 0–0.5)
EOSINOPHIL NFR BLD AUTO: 0.1 % — SIGNIFICANT CHANGE UP (ref 0–6)
GLUCOSE SERPL-MCNC: 113 MG/DL — HIGH (ref 70–99)
GLUCOSE SERPL-MCNC: 129 MG/DL — HIGH (ref 70–99)
HCT VFR BLD CALC: 30.1 % — LOW (ref 34.5–45)
HGB BLD-MCNC: 9.5 G/DL — LOW (ref 11.5–15.5)
IMM GRANULOCYTES NFR BLD AUTO: 0.3 % — SIGNIFICANT CHANGE UP (ref 0–1.5)
LYMPHOCYTES # BLD AUTO: 1.26 K/UL — SIGNIFICANT CHANGE UP (ref 1–3.3)
LYMPHOCYTES # BLD AUTO: 9.5 % — LOW (ref 13–44)
MCHC RBC-ENTMCNC: 28.4 PG — SIGNIFICANT CHANGE UP (ref 27–34)
MCHC RBC-ENTMCNC: 31.6 GM/DL — LOW (ref 32–36)
MCV RBC AUTO: 89.9 FL — SIGNIFICANT CHANGE UP (ref 80–100)
MONOCYTES # BLD AUTO: 1.42 K/UL — HIGH (ref 0–0.9)
MONOCYTES NFR BLD AUTO: 10.7 % — SIGNIFICANT CHANGE UP (ref 2–14)
NEUTROPHILS # BLD AUTO: 10.51 K/UL — HIGH (ref 1.8–7.4)
NEUTROPHILS NFR BLD AUTO: 79.3 % — HIGH (ref 43–77)
PLATELET # BLD AUTO: 369 K/UL — SIGNIFICANT CHANGE UP (ref 150–400)
POTASSIUM SERPL-MCNC: 3.6 MMOL/L — SIGNIFICANT CHANGE UP (ref 3.5–5.3)
POTASSIUM SERPL-MCNC: 3.6 MMOL/L — SIGNIFICANT CHANGE UP (ref 3.5–5.3)
POTASSIUM SERPL-SCNC: 3.6 MMOL/L — SIGNIFICANT CHANGE UP (ref 3.5–5.3)
POTASSIUM SERPL-SCNC: 3.6 MMOL/L — SIGNIFICANT CHANGE UP (ref 3.5–5.3)
PROT SERPL-MCNC: 5.9 G/DL — LOW (ref 6–8.3)
PROT SERPL-MCNC: 7.3 G/DL — SIGNIFICANT CHANGE UP (ref 6–8.3)
RBC # BLD: 3.35 M/UL — LOW (ref 3.8–5.2)
RBC # FLD: 14.2 % — SIGNIFICANT CHANGE UP (ref 10.3–14.5)
SODIUM SERPL-SCNC: 136 MMOL/L — SIGNIFICANT CHANGE UP (ref 135–145)
SODIUM SERPL-SCNC: 137 MMOL/L — SIGNIFICANT CHANGE UP (ref 135–145)
URATE SERPL-MCNC: 1.3 MG/DL — LOW (ref 2.5–7)
WBC # BLD: 13.25 K/UL — HIGH (ref 3.8–10.5)
WBC # FLD AUTO: 13.25 K/UL — HIGH (ref 3.8–10.5)

## 2017-05-17 PROCEDURE — 70553 MRI BRAIN STEM W/O & W/DYE: CPT | Mod: 26

## 2017-05-17 PROCEDURE — 93010 ELECTROCARDIOGRAM REPORT: CPT

## 2017-05-17 RX ORDER — ACYCLOVIR SODIUM 500 MG
400 VIAL (EA) INTRAVENOUS
Qty: 0 | Refills: 0 | Status: DISCONTINUED | OUTPATIENT
Start: 2017-05-17 | End: 2017-05-23

## 2017-05-17 RX ORDER — DIPHENHYDRAMINE HCL 50 MG
25 CAPSULE ORAL ONCE
Qty: 0 | Refills: 0 | Status: COMPLETED | OUTPATIENT
Start: 2017-05-17 | End: 2017-05-18

## 2017-05-17 RX ORDER — ENOXAPARIN SODIUM 100 MG/ML
40 INJECTION SUBCUTANEOUS EVERY 24 HOURS
Qty: 0 | Refills: 0 | Status: DISCONTINUED | OUTPATIENT
Start: 2017-05-17 | End: 2017-05-23

## 2017-05-17 RX ORDER — ALLOPURINOL 300 MG
300 TABLET ORAL DAILY
Qty: 0 | Refills: 0 | Status: DISCONTINUED | OUTPATIENT
Start: 2017-05-17 | End: 2017-05-17

## 2017-05-17 RX ORDER — ALLOPURINOL 300 MG
300 TABLET ORAL
Qty: 0 | Refills: 0 | Status: DISCONTINUED | OUTPATIENT
Start: 2017-05-17 | End: 2017-05-17

## 2017-05-17 RX ORDER — ALLOPURINOL 300 MG
300 TABLET ORAL
Qty: 0 | Refills: 0 | Status: DISCONTINUED | OUTPATIENT
Start: 2017-05-17 | End: 2017-05-23

## 2017-05-17 RX ORDER — ACETAMINOPHEN 500 MG
650 TABLET ORAL EVERY 6 HOURS
Qty: 0 | Refills: 0 | Status: DISCONTINUED | OUTPATIENT
Start: 2017-05-17 | End: 2017-05-23

## 2017-05-17 RX ORDER — SODIUM CHLORIDE 9 MG/ML
1000 INJECTION INTRAMUSCULAR; INTRAVENOUS; SUBCUTANEOUS
Qty: 0 | Refills: 0 | Status: DISCONTINUED | OUTPATIENT
Start: 2017-05-17 | End: 2017-05-23

## 2017-05-17 RX ORDER — ONDANSETRON 8 MG/1
4 TABLET, FILM COATED ORAL EVERY 6 HOURS
Qty: 0 | Refills: 0 | Status: DISCONTINUED | OUTPATIENT
Start: 2017-05-17 | End: 2017-05-23

## 2017-05-17 RX ORDER — HYDROCORTISONE 20 MG
50 TABLET ORAL ONCE
Qty: 0 | Refills: 0 | Status: DISCONTINUED | OUTPATIENT
Start: 2017-05-17 | End: 2017-05-17

## 2017-05-17 RX ORDER — ACETAMINOPHEN 500 MG
650 TABLET ORAL ONCE
Qty: 0 | Refills: 0 | Status: DISCONTINUED | OUTPATIENT
Start: 2017-05-17 | End: 2017-05-17

## 2017-05-17 RX ORDER — RITUXIMAB 10 MG/ML
626 INJECTION, SOLUTION INTRAVENOUS ONCE
Qty: 0 | Refills: 0 | Status: DISCONTINUED | OUTPATIENT
Start: 2017-05-17 | End: 2017-05-17

## 2017-05-17 RX ADMIN — Medication 300 MILLIGRAM(S): at 09:02

## 2017-05-17 RX ADMIN — Medication 300 MILLIGRAM(S): at 17:09

## 2017-05-17 RX ADMIN — ENOXAPARIN SODIUM 40 MILLIGRAM(S): 100 INJECTION SUBCUTANEOUS at 05:50

## 2017-05-17 RX ADMIN — Medication 1 TABLET(S): at 14:05

## 2017-05-17 RX ADMIN — Medication 400 MILLIGRAM(S): at 17:09

## 2017-05-18 LAB
ALBUMIN SERPL ELPH-MCNC: 2.7 G/DL — LOW (ref 3.3–5)
ALP SERPL-CCNC: 68 U/L — SIGNIFICANT CHANGE UP (ref 40–120)
ALT FLD-CCNC: 13 U/L RC — SIGNIFICANT CHANGE UP (ref 10–45)
ANION GAP SERPL CALC-SCNC: 12 MMOL/L — SIGNIFICANT CHANGE UP (ref 5–17)
ANION GAP SERPL CALC-SCNC: 14 MMOL/L — SIGNIFICANT CHANGE UP (ref 5–17)
AST SERPL-CCNC: 19 U/L — SIGNIFICANT CHANGE UP (ref 10–40)
BASOPHILS # BLD AUTO: 0 K/UL — SIGNIFICANT CHANGE UP (ref 0–0.2)
BASOPHILS NFR BLD AUTO: 0.1 % — SIGNIFICANT CHANGE UP (ref 0–2)
BILIRUB SERPL-MCNC: 0.5 MG/DL — SIGNIFICANT CHANGE UP (ref 0.2–1.2)
BUN SERPL-MCNC: 14 MG/DL — SIGNIFICANT CHANGE UP (ref 7–23)
BUN SERPL-MCNC: 18 MG/DL — SIGNIFICANT CHANGE UP (ref 7–23)
CALCIUM SERPL-MCNC: 9.2 MG/DL — SIGNIFICANT CHANGE UP (ref 8.4–10.5)
CALCIUM SERPL-MCNC: 9.8 MG/DL — SIGNIFICANT CHANGE UP (ref 8.4–10.5)
CHLORIDE SERPL-SCNC: 100 MMOL/L — SIGNIFICANT CHANGE UP (ref 96–108)
CHLORIDE SERPL-SCNC: 101 MMOL/L — SIGNIFICANT CHANGE UP (ref 96–108)
CK MB BLD-MCNC: 5.3 % — HIGH (ref 0–3.5)
CK MB CFR SERPL CALC: 1 NG/ML — SIGNIFICANT CHANGE UP (ref 0–3.8)
CK SERPL-CCNC: 19 U/L — LOW (ref 25–170)
CO2 SERPL-SCNC: 24 MMOL/L — SIGNIFICANT CHANGE UP (ref 22–31)
CO2 SERPL-SCNC: 24 MMOL/L — SIGNIFICANT CHANGE UP (ref 22–31)
CREAT SERPL-MCNC: 0.84 MG/DL — SIGNIFICANT CHANGE UP (ref 0.5–1.3)
CREAT SERPL-MCNC: 0.99 MG/DL — SIGNIFICANT CHANGE UP (ref 0.5–1.3)
CULTURE RESULTS: NO GROWTH — SIGNIFICANT CHANGE UP
EOSINOPHIL # BLD AUTO: 0 K/UL — SIGNIFICANT CHANGE UP (ref 0–0.5)
EOSINOPHIL NFR BLD AUTO: 0.2 % — SIGNIFICANT CHANGE UP (ref 0–6)
GLUCOSE SERPL-MCNC: 105 MG/DL — HIGH (ref 70–99)
GLUCOSE SERPL-MCNC: 92 MG/DL — SIGNIFICANT CHANGE UP (ref 70–99)
HCT VFR BLD CALC: 30.2 % — LOW (ref 34.5–45)
HGB BLD-MCNC: 9.6 G/DL — LOW (ref 11.5–15.5)
LYMPHOCYTES # BLD AUTO: 1.2 K/UL — SIGNIFICANT CHANGE UP (ref 1–3.3)
LYMPHOCYTES # BLD AUTO: 9.9 % — LOW (ref 13–44)
MAGNESIUM SERPL-MCNC: 2 MG/DL — SIGNIFICANT CHANGE UP (ref 1.6–2.6)
MCHC RBC-ENTMCNC: 29.2 PG — SIGNIFICANT CHANGE UP (ref 27–34)
MCHC RBC-ENTMCNC: 31.8 GM/DL — LOW (ref 32–36)
MCV RBC AUTO: 91.8 FL — SIGNIFICANT CHANGE UP (ref 80–100)
MONOCYTES # BLD AUTO: 1.1 K/UL — HIGH (ref 0–0.9)
MONOCYTES NFR BLD AUTO: 9.2 % — SIGNIFICANT CHANGE UP (ref 2–14)
NEUTROPHILS # BLD AUTO: 9.3 K/UL — HIGH (ref 1.8–7.4)
NEUTROPHILS NFR BLD AUTO: 80.6 % — HIGH (ref 43–77)
PHOSPHATE SERPL-MCNC: 2.6 MG/DL — SIGNIFICANT CHANGE UP (ref 2.5–4.5)
PLATELET # BLD AUTO: 362 K/UL — SIGNIFICANT CHANGE UP (ref 150–400)
POTASSIUM SERPL-MCNC: 3.5 MMOL/L — SIGNIFICANT CHANGE UP (ref 3.5–5.3)
POTASSIUM SERPL-MCNC: 4 MMOL/L — SIGNIFICANT CHANGE UP (ref 3.5–5.3)
POTASSIUM SERPL-SCNC: 3.5 MMOL/L — SIGNIFICANT CHANGE UP (ref 3.5–5.3)
POTASSIUM SERPL-SCNC: 4 MMOL/L — SIGNIFICANT CHANGE UP (ref 3.5–5.3)
PROT SERPL-MCNC: 5.8 G/DL — LOW (ref 6–8.3)
RBC # BLD: 3.3 M/UL — LOW (ref 3.8–5.2)
RBC # FLD: 12.7 % — SIGNIFICANT CHANGE UP (ref 10.3–14.5)
SODIUM SERPL-SCNC: 137 MMOL/L — SIGNIFICANT CHANGE UP (ref 135–145)
SODIUM SERPL-SCNC: 138 MMOL/L — SIGNIFICANT CHANGE UP (ref 135–145)
SPECIMEN SOURCE: SIGNIFICANT CHANGE UP
TROPONIN T SERPL-MCNC: <0.01 NG/ML — SIGNIFICANT CHANGE UP (ref 0–0.06)
URATE SERPL-MCNC: 1.2 MG/DL — LOW (ref 2.5–7)
WBC # BLD: 11.6 K/UL — HIGH (ref 3.8–10.5)
WBC # FLD AUTO: 11.6 K/UL — HIGH (ref 3.8–10.5)

## 2017-05-18 PROCEDURE — 99232 SBSQ HOSP IP/OBS MODERATE 35: CPT

## 2017-05-18 PROCEDURE — 93010 ELECTROCARDIOGRAM REPORT: CPT

## 2017-05-18 RX ORDER — ACETAMINOPHEN 500 MG
650 TABLET ORAL ONCE
Qty: 0 | Refills: 0 | Status: COMPLETED | OUTPATIENT
Start: 2017-05-18 | End: 2017-05-18

## 2017-05-18 RX ORDER — RITUXIMAB 10 MG/ML
626 INJECTION, SOLUTION INTRAVENOUS ONCE
Qty: 0 | Refills: 0 | Status: DISCONTINUED | OUTPATIENT
Start: 2017-05-18 | End: 2017-05-23

## 2017-05-18 RX ORDER — MEPERIDINE HYDROCHLORIDE 50 MG/ML
25 INJECTION INTRAMUSCULAR; INTRAVENOUS; SUBCUTANEOUS ONCE
Qty: 0 | Refills: 0 | Status: DISCONTINUED | OUTPATIENT
Start: 2017-05-18 | End: 2017-05-18

## 2017-05-18 RX ORDER — HYDROCORTISONE 20 MG
100 TABLET ORAL ONCE
Qty: 0 | Refills: 0 | Status: COMPLETED | OUTPATIENT
Start: 2017-05-18 | End: 2017-05-18

## 2017-05-18 RX ADMIN — Medication 300 MILLIGRAM(S): at 11:25

## 2017-05-18 RX ADMIN — Medication 100 MILLIGRAM(S): at 16:20

## 2017-05-18 RX ADMIN — MEPERIDINE HYDROCHLORIDE 25 MILLIGRAM(S): 50 INJECTION INTRAMUSCULAR; INTRAVENOUS; SUBCUTANEOUS at 16:19

## 2017-05-18 RX ADMIN — Medication 300 MILLIGRAM(S): at 18:15

## 2017-05-18 RX ADMIN — Medication 400 MILLIGRAM(S): at 18:15

## 2017-05-18 RX ADMIN — Medication 400 MILLIGRAM(S): at 05:25

## 2017-05-18 RX ADMIN — Medication 25 MILLIGRAM(S): at 14:02

## 2017-05-18 RX ADMIN — Medication 650 MILLIGRAM(S): at 14:04

## 2017-05-18 RX ADMIN — MEPERIDINE HYDROCHLORIDE 25 MILLIGRAM(S): 50 INJECTION INTRAMUSCULAR; INTRAVENOUS; SUBCUTANEOUS at 18:05

## 2017-05-18 NOTE — ADVANCED PRACTICE NURSE CONSULT - ASSESSMENT
Cycle # day 1/6Height and weight verified. Lab results as per Md gurpreet aware of same. Vital signs stable prior to the start of chemotherapy, see sunrise. pt send to IR for mediport placement but unable to do it as pt been having low grade fever dr nam and dr hanley made aware ID called and as per dr Bahena hold  chemotheraphy until cleared by ID pt seen by ID and cleared spoke to dr Bahena about it adviced to rituixin via piv today and pt to get port tommorow inserted piv by iv team on rt arm with good blood return site wdl pt premed with tylenol 650mg po ,and benadryl 25mg ivp pt started on rituxin 375mg/j6=260nz iv as intial porotocol intited at 1435 at 50cc/hr pt tolerated for 30min v/s stable  increased rate to 100cc/hr and tolerated for 30 min and v/s stable increased rate to 150cc/hr in 10min started with c/o retrosternal acute pain followed by rigors and chills v/s checked and stable held the infusion pt seen by CARMINE hennessy and ordered EKG and done spoke to dr Ryan about it advice to give demerol 25mg ivp , and hydrocortisone 100mg iv x1 and given pt felt much better observed her closely spoke to dr bahena about it and adviced to restart the infusion at half the rate and restarted at 1700 v/s stable started at 75cc/hr tolerated for 30 min v/s checked 30 min and increased rate q 30 min by 50cc/hr pt resting in bed spouse at the bed side Cycle # day 1/6Height and weight verified. Lab results as per Md gurpreet aware of same. Vital signs stable prior to the start of chemotherapy, see sunrise. pt send to IR for mediport placement but unable to do it as pt been having low grade fever dr nam and dr hanley made aware ID called and as per dr Bahena hold  chemotheraphy until cleared by ID pt seen by ID and cleared spoke to dr Bahena about it adviced to rituixin via piv today and pt to get port tommorow inserted piv by iv team on rt arm with good blood return site wdl pt premed with tylenol 650mg po ,and benadryl 25mg ivp pt started on rituxin 375mg/r1=814lk iv as intial porotocol intited at 1435 at 50cc/hr pt tolerated for 30min v/s stable  increased rate to 100cc/hr and tolerated for 30 min and v/s stable increased rate to 150cc/hr in 10min started with c/o retrosternal acute pain followed by rigors and chills v/s checked and stable held the infusion pt seen by CARMINE hennessy and ordered EKG and done spoke to dr Ryan about it advice to give demerol 25mg ivp , and hydrocortisone 100mg iv x1 and given pt felt much better observed her closely spoke to dr bahena about it and adviced to restart the infusion at half the rate and restarted at 1700 v/s stable started at 75cc/hr tolerated for 30 min v/s checked 30 min and increased rate q 30 min by 50cc/hr pt resting in bed spouse at the bed side completed the infusion at 2000 post v/s stable report given to the area nurse

## 2017-05-19 LAB
ALBUMIN SERPL ELPH-MCNC: 2.9 G/DL — LOW (ref 3.3–5)
ALP SERPL-CCNC: 72 U/L — SIGNIFICANT CHANGE UP (ref 40–120)
ALT FLD-CCNC: 23 U/L RC — SIGNIFICANT CHANGE UP (ref 10–45)
ANION GAP SERPL CALC-SCNC: 13 MMOL/L — SIGNIFICANT CHANGE UP (ref 5–17)
AST SERPL-CCNC: 31 U/L — SIGNIFICANT CHANGE UP (ref 10–40)
BASOPHILS # BLD AUTO: 0 K/UL — SIGNIFICANT CHANGE UP (ref 0–0.2)
BASOPHILS NFR BLD AUTO: 0 % — SIGNIFICANT CHANGE UP (ref 0–2)
BILIRUB SERPL-MCNC: 0.2 MG/DL — SIGNIFICANT CHANGE UP (ref 0.2–1.2)
BUN SERPL-MCNC: 18 MG/DL — SIGNIFICANT CHANGE UP (ref 7–23)
CALCIUM SERPL-MCNC: 9.5 MG/DL — SIGNIFICANT CHANGE UP (ref 8.4–10.5)
CHLORIDE SERPL-SCNC: 103 MMOL/L — SIGNIFICANT CHANGE UP (ref 96–108)
CO2 SERPL-SCNC: 24 MMOL/L — SIGNIFICANT CHANGE UP (ref 22–31)
CREAT SERPL-MCNC: 0.86 MG/DL — SIGNIFICANT CHANGE UP (ref 0.5–1.3)
EOSINOPHIL # BLD AUTO: 0 K/UL — SIGNIFICANT CHANGE UP (ref 0–0.5)
EOSINOPHIL NFR BLD AUTO: 0.1 % — SIGNIFICANT CHANGE UP (ref 0–6)
GLUCOSE SERPL-MCNC: 122 MG/DL — HIGH (ref 70–99)
HCT VFR BLD CALC: 28.8 % — LOW (ref 34.5–45)
HGB BLD-MCNC: 9.6 G/DL — LOW (ref 11.5–15.5)
LDH SERPL L TO P-CCNC: 576 U/L — HIGH (ref 50–242)
LYMPHOCYTES # BLD AUTO: 0.6 K/UL — LOW (ref 1–3.3)
LYMPHOCYTES # BLD AUTO: 4.3 % — LOW (ref 13–44)
MCHC RBC-ENTMCNC: 30.9 PG — SIGNIFICANT CHANGE UP (ref 27–34)
MCHC RBC-ENTMCNC: 33.4 GM/DL — SIGNIFICANT CHANGE UP (ref 32–36)
MCV RBC AUTO: 92.5 FL — SIGNIFICANT CHANGE UP (ref 80–100)
MONOCYTES # BLD AUTO: 0.4 K/UL — SIGNIFICANT CHANGE UP (ref 0–0.9)
MONOCYTES NFR BLD AUTO: 2.8 % — SIGNIFICANT CHANGE UP (ref 2–14)
NEUTROPHILS # BLD AUTO: 11.9 K/UL — HIGH (ref 1.8–7.4)
NEUTROPHILS NFR BLD AUTO: 92.8 % — HIGH (ref 43–77)
PLATELET # BLD AUTO: 364 K/UL — SIGNIFICANT CHANGE UP (ref 150–400)
POTASSIUM SERPL-MCNC: 3.8 MMOL/L — SIGNIFICANT CHANGE UP (ref 3.5–5.3)
POTASSIUM SERPL-SCNC: 3.8 MMOL/L — SIGNIFICANT CHANGE UP (ref 3.5–5.3)
PROT SERPL-MCNC: 6 G/DL — SIGNIFICANT CHANGE UP (ref 6–8.3)
RBC # BLD: 3.12 M/UL — LOW (ref 3.8–5.2)
RBC # FLD: 12.9 % — SIGNIFICANT CHANGE UP (ref 10.3–14.5)
SODIUM SERPL-SCNC: 140 MMOL/L — SIGNIFICANT CHANGE UP (ref 135–145)
TROPONIN T SERPL-MCNC: <0.01 NG/ML — SIGNIFICANT CHANGE UP (ref 0–0.06)
TROPONIN T SERPL-MCNC: <0.01 NG/ML — SIGNIFICANT CHANGE UP (ref 0–0.06)
URATE SERPL-MCNC: 1.2 MG/DL — LOW (ref 2.5–7)
WBC # BLD: 12.8 K/UL — HIGH (ref 3.8–10.5)
WBC # FLD AUTO: 12.8 K/UL — HIGH (ref 3.8–10.5)

## 2017-05-19 PROCEDURE — 76937 US GUIDE VASCULAR ACCESS: CPT | Mod: 26

## 2017-05-19 PROCEDURE — 77001 FLUOROGUIDE FOR VEIN DEVICE: CPT | Mod: 26

## 2017-05-19 PROCEDURE — 36561 INSERT TUNNELED CV CATH: CPT

## 2017-05-19 RX ORDER — ETOPOSIDE 20 MG/ML
83 VIAL (ML) INTRAVENOUS DAILY
Qty: 0 | Refills: 0 | Status: DISCONTINUED | OUTPATIENT
Start: 2017-05-19 | End: 2017-05-23

## 2017-05-19 RX ORDER — FOSAPREPITANT DIMEGLUMINE 150 MG/5ML
150 INJECTION, POWDER, LYOPHILIZED, FOR SOLUTION INTRAVENOUS ONCE
Qty: 0 | Refills: 0 | Status: COMPLETED | OUTPATIENT
Start: 2017-05-19 | End: 2017-05-19

## 2017-05-19 RX ORDER — ONDANSETRON 8 MG/1
16 TABLET, FILM COATED ORAL DAILY
Qty: 0 | Refills: 0 | Status: COMPLETED | OUTPATIENT
Start: 2017-05-19 | End: 2017-05-23

## 2017-05-19 RX ORDER — FAMOTIDINE 10 MG/ML
20 INJECTION INTRAVENOUS DAILY
Qty: 0 | Refills: 0 | Status: COMPLETED | OUTPATIENT
Start: 2017-05-19 | End: 2017-05-23

## 2017-05-19 RX ORDER — DOXORUBICIN HYDROCHLORIDE 2 MG/ML
16 INJECTION, SOLUTION INTRAVENOUS DAILY
Qty: 0 | Refills: 0 | Status: DISCONTINUED | OUTPATIENT
Start: 2017-05-19 | End: 2017-05-23

## 2017-05-19 RX ADMIN — Medication 400 MILLIGRAM(S): at 05:17

## 2017-05-19 RX ADMIN — Medication 300 MILLIGRAM(S): at 17:32

## 2017-05-19 RX ADMIN — Medication 1 TABLET(S): at 11:09

## 2017-05-19 RX ADMIN — Medication 50 MILLIGRAM(S): at 18:43

## 2017-05-19 RX ADMIN — FOSAPREPITANT DIMEGLUMINE 300 MILLIGRAM(S): 150 INJECTION, POWDER, LYOPHILIZED, FOR SOLUTION INTRAVENOUS at 13:40

## 2017-05-19 RX ADMIN — Medication 400 MILLIGRAM(S): at 17:31

## 2017-05-19 RX ADMIN — Medication 300 MILLIGRAM(S): at 12:07

## 2017-05-19 RX ADMIN — FAMOTIDINE 100 MILLIGRAM(S): 10 INJECTION INTRAVENOUS at 14:39

## 2017-05-19 RX ADMIN — ONDANSETRON 116 MILLIGRAM(S): 8 TABLET, FILM COATED ORAL at 13:40

## 2017-05-19 NOTE — ADVANCED PRACTICE NURSE CONSULT - ASSESSMENT
Cycle 1, day 1/5,Height and weight verified. Lab results as per dr dayan vázquez aware of same. Vital signs stable prior to chemotherapy, and  within accepectable parameters, see sunrise. Pt educated on the importance of saving urine, verbalizes good understanding. Pt education done re chemo regimen, drug effects and potential side effects, written materials provided, pt states understanding. . Pt RCW mediport with  line, site free from signs and symptoms of infection, good blood return obtained. Pre- medicated with emend 150 mgiv zofran 16 mg iv pepcid 20 mg iv etoposide 50 mg/m2=83 mg civi @21 ml/hr doxorubicin 10 mg/m2=16 mg civi w/ vincristine o.4 mg/m2=0.66 mg civi@ 21 ml/hr

## 2017-05-20 LAB
ALBUMIN SERPL ELPH-MCNC: 2.9 G/DL — LOW (ref 3.3–5)
ALP SERPL-CCNC: 71 U/L — SIGNIFICANT CHANGE UP (ref 40–120)
ALT FLD-CCNC: 25 U/L RC — SIGNIFICANT CHANGE UP (ref 10–45)
ANION GAP SERPL CALC-SCNC: 14 MMOL/L — SIGNIFICANT CHANGE UP (ref 5–17)
AST SERPL-CCNC: 32 U/L — SIGNIFICANT CHANGE UP (ref 10–40)
BILIRUB SERPL-MCNC: 0.2 MG/DL — SIGNIFICANT CHANGE UP (ref 0.2–1.2)
BUN SERPL-MCNC: 21 MG/DL — SIGNIFICANT CHANGE UP (ref 7–23)
CALCIUM SERPL-MCNC: 9.2 MG/DL — SIGNIFICANT CHANGE UP (ref 8.4–10.5)
CHLORIDE SERPL-SCNC: 103 MMOL/L — SIGNIFICANT CHANGE UP (ref 96–108)
CO2 SERPL-SCNC: 20 MMOL/L — LOW (ref 22–31)
CREAT SERPL-MCNC: 0.81 MG/DL — SIGNIFICANT CHANGE UP (ref 0.5–1.3)
GLUCOSE SERPL-MCNC: 144 MG/DL — HIGH (ref 70–99)
HCT VFR BLD CALC: 29.4 % — LOW (ref 34.5–45)
HGB BLD-MCNC: 9.7 G/DL — LOW (ref 11.5–15.5)
LDH SERPL L TO P-CCNC: 665 U/L — HIGH (ref 50–242)
MCHC RBC-ENTMCNC: 30.6 PG — SIGNIFICANT CHANGE UP (ref 27–34)
MCHC RBC-ENTMCNC: 33 GM/DL — SIGNIFICANT CHANGE UP (ref 32–36)
MCV RBC AUTO: 92.6 FL — SIGNIFICANT CHANGE UP (ref 80–100)
PLATELET # BLD AUTO: 194 K/UL — SIGNIFICANT CHANGE UP (ref 150–400)
POTASSIUM SERPL-MCNC: 4.4 MMOL/L — SIGNIFICANT CHANGE UP (ref 3.5–5.3)
POTASSIUM SERPL-SCNC: 4.4 MMOL/L — SIGNIFICANT CHANGE UP (ref 3.5–5.3)
PROT SERPL-MCNC: 5.9 G/DL — LOW (ref 6–8.3)
RBC # BLD: 3.17 M/UL — LOW (ref 3.8–5.2)
RBC # FLD: 12.9 % — SIGNIFICANT CHANGE UP (ref 10.3–14.5)
SODIUM SERPL-SCNC: 137 MMOL/L — SIGNIFICANT CHANGE UP (ref 135–145)
URATE SERPL-MCNC: 1 MG/DL — LOW (ref 2.5–7)
WBC # BLD: 7.5 K/UL — SIGNIFICANT CHANGE UP (ref 3.8–10.5)
WBC # FLD AUTO: 7.5 K/UL — SIGNIFICANT CHANGE UP (ref 3.8–10.5)

## 2017-05-20 RX ADMIN — Medication 300 MILLIGRAM(S): at 17:28

## 2017-05-20 RX ADMIN — ONDANSETRON 116 MILLIGRAM(S): 8 TABLET, FILM COATED ORAL at 15:04

## 2017-05-20 RX ADMIN — Medication 400 MILLIGRAM(S): at 05:38

## 2017-05-20 RX ADMIN — FAMOTIDINE 100 MILLIGRAM(S): 10 INJECTION INTRAVENOUS at 15:04

## 2017-05-20 RX ADMIN — ENOXAPARIN SODIUM 40 MILLIGRAM(S): 100 INJECTION SUBCUTANEOUS at 05:38

## 2017-05-20 RX ADMIN — Medication 300 MILLIGRAM(S): at 10:22

## 2017-05-20 RX ADMIN — Medication 50 MILLIGRAM(S): at 05:37

## 2017-05-20 RX ADMIN — Medication 50 MILLIGRAM(S): at 17:27

## 2017-05-20 RX ADMIN — Medication 400 MILLIGRAM(S): at 17:27

## 2017-05-20 NOTE — ADVANCED PRACTICE NURSE CONSULT - ASSESSMENT
Cycle # day 1/5. Lab results as per Md gurpreet aware of same. Vital signs stable prior to the start of chemotherapy, see sunrise.  Pt educated on the importance of saving urine, verbalize understanding of same.Pt education done re chemo regimen, drug effects and potential side effects, pt states understanding she tolerated previous day chemotherapy without side effects.Pt with rt Chest wall mediport  site free from signs and symptoms of infection, positive blood return obtained. Pre-medicated with pepcid 20mg ivss,zofran 16mg ivss. pt started day 2 of continuous infusion etoposide 50mg/m2=83mg CI at 22cc/hr and doxorubicin 10mg/m2=16mg mixed with vincristine 0.4mg/m2=0.66mg CI at 22cc/hr intiated at 1615 via pump report given to the area nurse Cycle # day 2/5. Lab results as per Md gurpreet aware of same. Vital signs stable prior to the start of chemotherapy, see sunrise.  Pt educated on the importance of saving urine, verbalize understanding of same.Pt education done re chemo regimen, drug effects and potential side effects, pt states understanding she tolerated previous day chemotherapy without side effects.Pt with rt Chest wall mediport  site free from signs and symptoms of infection, positive blood return obtained. Pre-medicated with pepcid 20mg ivss,zofran 16mg ivss. pt started day 2 of continuous infusion etoposide 50mg/m2=83mg CI at 22cc/hr and doxorubicin 10mg/m2=16mg mixed with vincristine 0.4mg/m2=0.66mg CI at 22cc/hr intiated at 1615 via pump report given to the area nurse

## 2017-05-21 LAB
ALBUMIN SERPL ELPH-MCNC: 2.8 G/DL — LOW (ref 3.3–5)
ALP SERPL-CCNC: 63 U/L — SIGNIFICANT CHANGE UP (ref 40–120)
ALT FLD-CCNC: 19 U/L RC — SIGNIFICANT CHANGE UP (ref 10–45)
ANION GAP SERPL CALC-SCNC: 9 MMOL/L — SIGNIFICANT CHANGE UP (ref 5–17)
AST SERPL-CCNC: 19 U/L — SIGNIFICANT CHANGE UP (ref 10–40)
BASOPHILS # BLD AUTO: 0 K/UL — SIGNIFICANT CHANGE UP (ref 0–0.2)
BASOPHILS NFR BLD AUTO: 0.1 % — SIGNIFICANT CHANGE UP (ref 0–2)
BILIRUB SERPL-MCNC: 0.2 MG/DL — SIGNIFICANT CHANGE UP (ref 0.2–1.2)
BUN SERPL-MCNC: 22 MG/DL — SIGNIFICANT CHANGE UP (ref 7–23)
CALCIUM SERPL-MCNC: 9.3 MG/DL — SIGNIFICANT CHANGE UP (ref 8.4–10.5)
CHLORIDE SERPL-SCNC: 105 MMOL/L — SIGNIFICANT CHANGE UP (ref 96–108)
CO2 SERPL-SCNC: 25 MMOL/L — SIGNIFICANT CHANGE UP (ref 22–31)
CREAT SERPL-MCNC: 0.87 MG/DL — SIGNIFICANT CHANGE UP (ref 0.5–1.3)
EOSINOPHIL # BLD AUTO: 0 K/UL — SIGNIFICANT CHANGE UP (ref 0–0.5)
EOSINOPHIL NFR BLD AUTO: 0.2 % — SIGNIFICANT CHANGE UP (ref 0–6)
GLUCOSE SERPL-MCNC: 114 MG/DL — HIGH (ref 70–99)
HCT VFR BLD CALC: 28.6 % — LOW (ref 34.5–45)
HGB BLD-MCNC: 9.4 G/DL — LOW (ref 11.5–15.5)
LDH SERPL L TO P-CCNC: 485 U/L — HIGH (ref 50–242)
LYMPHOCYTES # BLD AUTO: 0.9 K/UL — LOW (ref 1–3.3)
LYMPHOCYTES # BLD AUTO: 7.8 % — LOW (ref 13–44)
MCHC RBC-ENTMCNC: 30.5 PG — SIGNIFICANT CHANGE UP (ref 27–34)
MCHC RBC-ENTMCNC: 33 GM/DL — SIGNIFICANT CHANGE UP (ref 32–36)
MCV RBC AUTO: 92.3 FL — SIGNIFICANT CHANGE UP (ref 80–100)
MONOCYTES # BLD AUTO: 0.6 K/UL — SIGNIFICANT CHANGE UP (ref 0–0.9)
MONOCYTES NFR BLD AUTO: 5.3 % — SIGNIFICANT CHANGE UP (ref 2–14)
NEUTROPHILS # BLD AUTO: 10.2 K/UL — HIGH (ref 1.8–7.4)
NEUTROPHILS NFR BLD AUTO: 86.7 % — HIGH (ref 43–77)
PLATELET # BLD AUTO: 359 K/UL — SIGNIFICANT CHANGE UP (ref 150–400)
POTASSIUM SERPL-MCNC: 3.9 MMOL/L — SIGNIFICANT CHANGE UP (ref 3.5–5.3)
POTASSIUM SERPL-SCNC: 3.9 MMOL/L — SIGNIFICANT CHANGE UP (ref 3.5–5.3)
PROT SERPL-MCNC: 5.6 G/DL — LOW (ref 6–8.3)
RBC # BLD: 3.1 M/UL — LOW (ref 3.8–5.2)
RBC # FLD: 12.7 % — SIGNIFICANT CHANGE UP (ref 10.3–14.5)
SODIUM SERPL-SCNC: 139 MMOL/L — SIGNIFICANT CHANGE UP (ref 135–145)
URATE SERPL-MCNC: 1 MG/DL — LOW (ref 2.5–7)
WBC # BLD: 11.7 K/UL — HIGH (ref 3.8–10.5)
WBC # FLD AUTO: 11.7 K/UL — HIGH (ref 3.8–10.5)

## 2017-05-21 RX ADMIN — FAMOTIDINE 100 MILLIGRAM(S): 10 INJECTION INTRAVENOUS at 14:12

## 2017-05-21 RX ADMIN — ONDANSETRON 116 MILLIGRAM(S): 8 TABLET, FILM COATED ORAL at 14:11

## 2017-05-21 RX ADMIN — Medication 400 MILLIGRAM(S): at 18:43

## 2017-05-21 RX ADMIN — Medication 300 MILLIGRAM(S): at 18:43

## 2017-05-21 RX ADMIN — Medication 300 MILLIGRAM(S): at 09:48

## 2017-05-21 RX ADMIN — Medication 400 MILLIGRAM(S): at 06:51

## 2017-05-21 RX ADMIN — Medication 50 MILLIGRAM(S): at 18:43

## 2017-05-21 RX ADMIN — Medication 50 MILLIGRAM(S): at 09:48

## 2017-05-21 RX ADMIN — ENOXAPARIN SODIUM 40 MILLIGRAM(S): 100 INJECTION SUBCUTANEOUS at 06:54

## 2017-05-21 NOTE — ADVANCED PRACTICE NURSE CONSULT - ASSESSMENT
Pt. seen in bed a/ox4,denies any discomfort at this time.Pt education done re chemo regimen, drug effects and potential side effects, pt states understanding she tolerated previous day chemotherapy without side effects.Pt with right chest wall mediport . site free from signs and symptoms of infection.Dsg dry and intact.with positive blood return noted and flushing easily with 10 ml NS.Pre-medicated with pepcid 20mg ivss and ,zofran 16mg ivss.Day 3/4 etoposide 50mg/m2=83mg CIVI  at 22.9cc/hr andAy 3/4 doxorubicin 10mg/m2=16mg mixed with vincristine 0.4mg/m2=0.66mg CI at 22.9cc/hr intiated at 1545 via alaris pump report given to the area nurse .Primary RN aware of present treatment.Left pt comfortable in bed.

## 2017-05-22 LAB
ALBUMIN SERPL ELPH-MCNC: 2.8 G/DL — LOW (ref 3.3–5)
ALP SERPL-CCNC: 60 U/L — SIGNIFICANT CHANGE UP (ref 40–120)
ALT FLD-CCNC: 17 U/L RC — SIGNIFICANT CHANGE UP (ref 10–45)
ANION GAP SERPL CALC-SCNC: 9 MMOL/L — SIGNIFICANT CHANGE UP (ref 5–17)
AST SERPL-CCNC: 17 U/L — SIGNIFICANT CHANGE UP (ref 10–40)
BASOPHILS # BLD AUTO: 0 K/UL — SIGNIFICANT CHANGE UP (ref 0–0.2)
BASOPHILS NFR BLD AUTO: 0.1 % — SIGNIFICANT CHANGE UP (ref 0–2)
BILIRUB SERPL-MCNC: 0.3 MG/DL — SIGNIFICANT CHANGE UP (ref 0.2–1.2)
BUN SERPL-MCNC: 22 MG/DL — SIGNIFICANT CHANGE UP (ref 7–23)
CALCIUM SERPL-MCNC: 9.3 MG/DL — SIGNIFICANT CHANGE UP (ref 8.4–10.5)
CHLORIDE SERPL-SCNC: 103 MMOL/L — SIGNIFICANT CHANGE UP (ref 96–108)
CO2 SERPL-SCNC: 27 MMOL/L — SIGNIFICANT CHANGE UP (ref 22–31)
CREAT SERPL-MCNC: 0.85 MG/DL — SIGNIFICANT CHANGE UP (ref 0.5–1.3)
CULTURE RESULTS: SIGNIFICANT CHANGE UP
CULTURE RESULTS: SIGNIFICANT CHANGE UP
EOSINOPHIL # BLD AUTO: 0 K/UL — SIGNIFICANT CHANGE UP (ref 0–0.5)
EOSINOPHIL NFR BLD AUTO: 0 % — SIGNIFICANT CHANGE UP (ref 0–6)
GLUCOSE SERPL-MCNC: 126 MG/DL — HIGH (ref 70–99)
HCT VFR BLD CALC: 28.6 % — LOW (ref 34.5–45)
HGB BLD-MCNC: 9.5 G/DL — LOW (ref 11.5–15.5)
LDH SERPL L TO P-CCNC: 446 U/L — HIGH (ref 50–242)
LYMPHOCYTES # BLD AUTO: 0.7 K/UL — LOW (ref 1–3.3)
LYMPHOCYTES # BLD AUTO: 8.8 % — LOW (ref 13–44)
MCHC RBC-ENTMCNC: 30 PG — SIGNIFICANT CHANGE UP (ref 27–34)
MCHC RBC-ENTMCNC: 33.1 GM/DL — SIGNIFICANT CHANGE UP (ref 32–36)
MCV RBC AUTO: 90.7 FL — SIGNIFICANT CHANGE UP (ref 80–100)
MONOCYTES # BLD AUTO: 0.2 K/UL — SIGNIFICANT CHANGE UP (ref 0–0.9)
MONOCYTES NFR BLD AUTO: 1.8 % — LOW (ref 2–14)
NEUTROPHILS # BLD AUTO: 7.5 K/UL — HIGH (ref 1.8–7.4)
NEUTROPHILS NFR BLD AUTO: 89.3 % — HIGH (ref 43–77)
PLATELET # BLD AUTO: 336 K/UL — SIGNIFICANT CHANGE UP (ref 150–400)
POTASSIUM SERPL-MCNC: 4.1 MMOL/L — SIGNIFICANT CHANGE UP (ref 3.5–5.3)
POTASSIUM SERPL-SCNC: 4.1 MMOL/L — SIGNIFICANT CHANGE UP (ref 3.5–5.3)
PROT SERPL-MCNC: 5.5 G/DL — LOW (ref 6–8.3)
RBC # BLD: 3.16 M/UL — LOW (ref 3.8–5.2)
RBC # FLD: 12.6 % — SIGNIFICANT CHANGE UP (ref 10.3–14.5)
SODIUM SERPL-SCNC: 139 MMOL/L — SIGNIFICANT CHANGE UP (ref 135–145)
SPECIMEN SOURCE: SIGNIFICANT CHANGE UP
SPECIMEN SOURCE: SIGNIFICANT CHANGE UP
URATE SERPL-MCNC: 1.1 MG/DL — LOW (ref 2.5–7)
WBC # BLD: 8.4 K/UL — SIGNIFICANT CHANGE UP (ref 3.8–10.5)
WBC # FLD AUTO: 8.4 K/UL — SIGNIFICANT CHANGE UP (ref 3.8–10.5)

## 2017-05-22 RX ADMIN — Medication 400 MILLIGRAM(S): at 17:38

## 2017-05-22 RX ADMIN — Medication 300 MILLIGRAM(S): at 17:38

## 2017-05-22 RX ADMIN — ONDANSETRON 116 MILLIGRAM(S): 8 TABLET, FILM COATED ORAL at 14:45

## 2017-05-22 RX ADMIN — Medication 400 MILLIGRAM(S): at 06:53

## 2017-05-22 RX ADMIN — ENOXAPARIN SODIUM 40 MILLIGRAM(S): 100 INJECTION SUBCUTANEOUS at 06:54

## 2017-05-22 RX ADMIN — Medication 50 MILLIGRAM(S): at 17:38

## 2017-05-22 RX ADMIN — Medication 300 MILLIGRAM(S): at 08:58

## 2017-05-22 RX ADMIN — FAMOTIDINE 100 MILLIGRAM(S): 10 INJECTION INTRAVENOUS at 14:12

## 2017-05-22 RX ADMIN — Medication 50 MILLIGRAM(S): at 08:58

## 2017-05-22 RX ADMIN — Medication 1 TABLET(S): at 09:57

## 2017-05-22 NOTE — DIETITIAN INITIAL EVALUATION ADULT. - ENERGY NEEDS
Pt seen for length of stay. Pt c PMH including recently diagnosed B-Cell lymphoma S/P exploratory laparotomy now admitted for chemotherapy.     Ht:5'6" , Wt:130.5 lbs, BMI:21.1 kg/m2, IBW:130 lbs +/- 10%, %IBW: 100%  No edema, Skin intact

## 2017-05-22 NOTE — DIETITIAN INITIAL EVALUATION ADULT. - ORAL INTAKE PTA
good/Pt reports eating well c good appetite consuming 3 meals per day plus snacks, no recent changes in appetite or intake

## 2017-05-22 NOTE — DIETITIAN INITIAL EVALUATION ADULT. - PROBLEM SELECTOR PLAN 4
lovenox  care discussed w/ np  dr nam to assume care in am  daughter dr leno gomez (ent attg @ [facility]) [phone]

## 2017-05-22 NOTE — ADVANCED PRACTICE NURSE CONSULT - ASSESSMENT
Pt found sitting in chair. No c/o. Labs today WBC 8.4 HGB 9.5 HCT 28.6 platelets 336 serum creatine 0.85 t.bili 0.3. Chemotherapy verified with primary RN. Mediport @ right anterior chest wall previous ly accessed flushes easily with 0.9% normal saline with a positive brisk blood return noted. Pt received Doxorubicin 10mg/m2=16mg CIV mixed with Vincristine 0.4mg/m2=0.66mg CIV  via a pump into lowest port of Etoposide 50mg/m2=83mg CIV via a pump @  1500. Primary RN aware of plan of care.

## 2017-05-22 NOTE — DIETITIAN INITIAL EVALUATION ADULT. - OTHER INFO
Pt reports slight weight loss over the past month from  lbs to lowest weight of 126 lbs at MD office, recently pt appears to have regained weight, noted admit weight of 130.5 lbs (5/17). Pt with no food allergies, was taking vitamin B12 and multivitamin at home-requested to stop by oncology team. No N+V, last BM yesterday. Pt reports some difficulty chewing due to jaw numbness associated c disease, has been able to manage regular foods by cutting into small pieces and eating slowly-declined downgraded diet at this time. In house pt reports eating well consuming 100% of meals since admission, enjoys food options.

## 2017-05-22 NOTE — DIETITIAN INITIAL EVALUATION ADULT. - NS AS NUTRI INTERV MEALS SNACK
1. Continue regular diet as ordered, 2. Continue to encourage po intake and obtain/honor food preferences as able-Reviewed importance of adequate nutrition and overall goal of weight maintenance, 3. Monitor PO intake, diet tolerance, weight trends, labs, and skin integrity, 4.RD to remain available as needed

## 2017-05-22 NOTE — DIETITIAN INITIAL EVALUATION ADULT. - PROBLEM SELECTOR PLAN 2
Subjective sensation of swelling, not noted on exam. no evidence of oral infection. symptoms likely 2/2 lymphoma as noted on PET scan.  -would not continue augmentin for infection

## 2017-05-22 NOTE — DIETITIAN INITIAL EVALUATION ADULT. - PROBLEM SELECTOR PLAN 3
Likely 2/2 known DLBCL. currently satting comfortably on room air.  -start treatment for underlying malignancy  -consider pulm consult for thoracentesis

## 2017-05-22 NOTE — DIETITIAN INITIAL EVALUATION ADULT. - PROBLEM SELECTOR PLAN 1
Admitting for chemotherapy initiation due to concern for rapid progression of disease  -Oncology eval - Dr Ricks - re: chemotherapy initiation  -c/w allpurinol bid as started outpatient  -to be started on prophylactic meds per onc recs  -Neurology consulted for possible LP to r/o LM disease, brain mri to further stage disease; per ed records to see in am

## 2017-05-23 ENCOUNTER — TRANSCRIPTION ENCOUNTER (OUTPATIENT)
Age: 77
End: 2017-05-23

## 2017-05-23 VITALS
RESPIRATION RATE: 18 BRPM | SYSTOLIC BLOOD PRESSURE: 148 MMHG | HEART RATE: 71 BPM | DIASTOLIC BLOOD PRESSURE: 84 MMHG | TEMPERATURE: 98 F | OXYGEN SATURATION: 95 %

## 2017-05-23 LAB
ALBUMIN SERPL ELPH-MCNC: 2.6 G/DL — LOW (ref 3.3–5)
ALP SERPL-CCNC: 55 U/L — SIGNIFICANT CHANGE UP (ref 40–120)
ALT FLD-CCNC: 15 U/L — SIGNIFICANT CHANGE UP (ref 10–45)
ANION GAP SERPL CALC-SCNC: 12 MMOL/L — SIGNIFICANT CHANGE UP (ref 5–17)
AST SERPL-CCNC: 22 U/L — SIGNIFICANT CHANGE UP (ref 10–40)
BASOPHILS # BLD AUTO: 0 K/UL — SIGNIFICANT CHANGE UP (ref 0–0.2)
BASOPHILS NFR BLD AUTO: 0 % — SIGNIFICANT CHANGE UP (ref 0–2)
BILIRUB SERPL-MCNC: 0.2 MG/DL — SIGNIFICANT CHANGE UP (ref 0.2–1.2)
BUN SERPL-MCNC: 20 MG/DL — SIGNIFICANT CHANGE UP (ref 7–23)
CALCIUM SERPL-MCNC: 8.4 MG/DL — SIGNIFICANT CHANGE UP (ref 8.4–10.5)
CHLORIDE SERPL-SCNC: 103 MMOL/L — SIGNIFICANT CHANGE UP (ref 96–108)
CO2 SERPL-SCNC: 22 MMOL/L — SIGNIFICANT CHANGE UP (ref 22–31)
CREAT SERPL-MCNC: 0.73 MG/DL — SIGNIFICANT CHANGE UP (ref 0.5–1.3)
EOSINOPHIL # BLD AUTO: 0 K/UL — SIGNIFICANT CHANGE UP (ref 0–0.5)
EOSINOPHIL NFR BLD AUTO: 0 % — SIGNIFICANT CHANGE UP (ref 0–6)
GLUCOSE SERPL-MCNC: 121 MG/DL — HIGH (ref 70–99)
HCT VFR BLD CALC: 28.1 % — LOW (ref 34.5–45)
HGB BLD-MCNC: 9.1 G/DL — LOW (ref 11.5–15.5)
IMM GRANULOCYTES NFR BLD AUTO: 0.2 % — SIGNIFICANT CHANGE UP (ref 0–1.5)
LDH SERPL L TO P-CCNC: 453 U/L — HIGH (ref 50–242)
LYMPHOCYTES # BLD AUTO: 0.67 K/UL — LOW (ref 1–3.3)
LYMPHOCYTES # BLD AUTO: 10.1 % — LOW (ref 13–44)
MCHC RBC-ENTMCNC: 27.9 PG — SIGNIFICANT CHANGE UP (ref 27–34)
MCHC RBC-ENTMCNC: 32.4 GM/DL — SIGNIFICANT CHANGE UP (ref 32–36)
MCV RBC AUTO: 86.2 FL — SIGNIFICANT CHANGE UP (ref 80–100)
MONOCYTES # BLD AUTO: 0.04 K/UL — SIGNIFICANT CHANGE UP (ref 0–0.9)
MONOCYTES NFR BLD AUTO: 0.6 % — LOW (ref 2–14)
NEUTROPHILS # BLD AUTO: 5.92 K/UL — SIGNIFICANT CHANGE UP (ref 1.8–7.4)
NEUTROPHILS NFR BLD AUTO: 89.1 % — HIGH (ref 43–77)
PHOSPHATE SERPL-MCNC: 2.7 MG/DL — SIGNIFICANT CHANGE UP (ref 2.5–4.5)
PLATELET # BLD AUTO: 353 K/UL — SIGNIFICANT CHANGE UP (ref 150–400)
POTASSIUM SERPL-MCNC: 3.8 MMOL/L — SIGNIFICANT CHANGE UP (ref 3.5–5.3)
POTASSIUM SERPL-SCNC: 3.8 MMOL/L — SIGNIFICANT CHANGE UP (ref 3.5–5.3)
PROT SERPL-MCNC: 5.3 G/DL — LOW (ref 6–8.3)
RBC # BLD: 3.26 M/UL — LOW (ref 3.8–5.2)
RBC # FLD: 13.5 % — SIGNIFICANT CHANGE UP (ref 10.3–14.5)
SODIUM SERPL-SCNC: 137 MMOL/L — SIGNIFICANT CHANGE UP (ref 135–145)
URATE SERPL-MCNC: 0.9 MG/DL — LOW (ref 2.5–7)
WBC # BLD: 6.64 K/UL — SIGNIFICANT CHANGE UP (ref 3.8–10.5)
WBC # FLD AUTO: 6.64 K/UL — SIGNIFICANT CHANGE UP (ref 3.8–10.5)

## 2017-05-23 PROCEDURE — 85730 THROMBOPLASTIN TIME PARTIAL: CPT

## 2017-05-23 PROCEDURE — 70553 MRI BRAIN STEM W/O & W/DYE: CPT

## 2017-05-23 PROCEDURE — 82550 ASSAY OF CK (CPK): CPT

## 2017-05-23 PROCEDURE — 82565 ASSAY OF CREATININE: CPT

## 2017-05-23 PROCEDURE — 99285 EMERGENCY DEPT VISIT HI MDM: CPT | Mod: 25

## 2017-05-23 PROCEDURE — 76937 US GUIDE VASCULAR ACCESS: CPT

## 2017-05-23 PROCEDURE — 81001 URINALYSIS AUTO W/SCOPE: CPT

## 2017-05-23 PROCEDURE — 82435 ASSAY OF BLOOD CHLORIDE: CPT

## 2017-05-23 PROCEDURE — 84100 ASSAY OF PHOSPHORUS: CPT

## 2017-05-23 PROCEDURE — 83735 ASSAY OF MAGNESIUM: CPT

## 2017-05-23 PROCEDURE — 85027 COMPLETE CBC AUTOMATED: CPT

## 2017-05-23 PROCEDURE — 83615 LACTATE (LD) (LDH) ENZYME: CPT

## 2017-05-23 PROCEDURE — C1769: CPT

## 2017-05-23 PROCEDURE — 82330 ASSAY OF CALCIUM: CPT

## 2017-05-23 PROCEDURE — 77001 FLUOROGUIDE FOR VEIN DEVICE: CPT

## 2017-05-23 PROCEDURE — 83605 ASSAY OF LACTIC ACID: CPT

## 2017-05-23 PROCEDURE — 80076 HEPATIC FUNCTION PANEL: CPT

## 2017-05-23 PROCEDURE — 87040 BLOOD CULTURE FOR BACTERIA: CPT

## 2017-05-23 PROCEDURE — C1788: CPT

## 2017-05-23 PROCEDURE — 82947 ASSAY GLUCOSE BLOOD QUANT: CPT

## 2017-05-23 PROCEDURE — 36561 INSERT TUNNELED CV CATH: CPT

## 2017-05-23 PROCEDURE — 84295 ASSAY OF SERUM SODIUM: CPT

## 2017-05-23 PROCEDURE — 82803 BLOOD GASES ANY COMBINATION: CPT

## 2017-05-23 PROCEDURE — 84484 ASSAY OF TROPONIN QUANT: CPT

## 2017-05-23 PROCEDURE — 82553 CREATINE MB FRACTION: CPT

## 2017-05-23 PROCEDURE — 85610 PROTHROMBIN TIME: CPT

## 2017-05-23 PROCEDURE — 85014 HEMATOCRIT: CPT

## 2017-05-23 PROCEDURE — 87086 URINE CULTURE/COLONY COUNT: CPT

## 2017-05-23 PROCEDURE — 84132 ASSAY OF SERUM POTASSIUM: CPT

## 2017-05-23 PROCEDURE — 93005 ELECTROCARDIOGRAM TRACING: CPT

## 2017-05-23 PROCEDURE — 80048 BASIC METABOLIC PNL TOTAL CA: CPT

## 2017-05-23 PROCEDURE — 71045 X-RAY EXAM CHEST 1 VIEW: CPT

## 2017-05-23 PROCEDURE — 80053 COMPREHEN METABOLIC PANEL: CPT

## 2017-05-23 PROCEDURE — 84550 ASSAY OF BLOOD/URIC ACID: CPT

## 2017-05-23 PROCEDURE — C1894: CPT

## 2017-05-23 RX ORDER — AZTREONAM 2 G
1 VIAL (EA) INJECTION
Qty: 0 | Refills: 0 | COMMUNITY

## 2017-05-23 RX ORDER — ALLOPURINOL 300 MG
1 TABLET ORAL
Qty: 0 | Refills: 0 | COMMUNITY
Start: 2017-05-23

## 2017-05-23 RX ORDER — ALLOPURINOL 300 MG
1 TABLET ORAL
Qty: 0 | Refills: 0 | COMMUNITY

## 2017-05-23 RX ORDER — CYCLOPHOSPHAMIDE 100 MG
835 VIAL (EA) INTRAVENOUS ONCE
Qty: 0 | Refills: 0 | Status: COMPLETED | OUTPATIENT
Start: 2017-05-23 | End: 2017-05-23

## 2017-05-23 RX ORDER — ACYCLOVIR SODIUM 500 MG
1 VIAL (EA) INTRAVENOUS
Qty: 0 | Refills: 0 | COMMUNITY
Start: 2017-05-23

## 2017-05-23 RX ORDER — ACYCLOVIR SODIUM 500 MG
1 VIAL (EA) INTRAVENOUS
Qty: 0 | Refills: 0 | COMMUNITY

## 2017-05-23 RX ADMIN — ENOXAPARIN SODIUM 40 MILLIGRAM(S): 100 INJECTION SUBCUTANEOUS at 05:49

## 2017-05-23 RX ADMIN — Medication 50 MILLIGRAM(S): at 17:25

## 2017-05-23 RX ADMIN — Medication 400 MILLIGRAM(S): at 17:25

## 2017-05-23 RX ADMIN — FAMOTIDINE 100 MILLIGRAM(S): 10 INJECTION INTRAVENOUS at 11:57

## 2017-05-23 RX ADMIN — Medication 300 MILLIGRAM(S): at 08:43

## 2017-05-23 RX ADMIN — Medication 400 MILLIGRAM(S): at 05:49

## 2017-05-23 RX ADMIN — Medication 50 MILLIGRAM(S): at 08:43

## 2017-05-23 RX ADMIN — ONDANSETRON 116 MILLIGRAM(S): 8 TABLET, FILM COATED ORAL at 13:16

## 2017-05-23 NOTE — ADVANCED PRACTICE NURSE CONSULT - ASSESSMENT
Cycle #1 day 5/5. Lab results as per Md gurpreet aware of same. Vital signs stable prior to the start of chemotherapy, see sunrise.  Pt re-educated on the importance of saving urine, verbalize understanding of same. Pt re-ducation done re chemo regimen, drug effects and potential side effects, states understanding. Reports that  she tolerated previous day chemotherapy without side effects with the exception of Rituxan on day #1. Pt with right Chest wall Mediport  site free from signs and symptoms of infection, positive blood return obtained. Pre-medicated with Pepcid 20mg IVPB  and Zofran 16mg IVPB. Patient completed day 4 of chemotherapy and  started Cyclophosphamide  500mg/r4=112db in compatible IVF day #5 over 30 minutes. Patient tolerating same well without immediate side effects noted.

## 2017-05-23 NOTE — DISCHARGE NOTE ADULT - HOSPITAL COURSE
to be completed by attending 76F hx B cell lymphoma (not yet on CTX) presenting from outpt office to start chemo. per the patient and family, she was recently diagnosed with diffuse large b cell lymphoma with a exploratory laparotomy. she has been following w/ oncology outpatient. around 2 weeks ago she noticed some jaw numbness and swelling which has progressed over the past 2 weeks. Admit for chemo initiation5/17  MRI head - No evidence of parenchymal or leptomeningeal metastatic   disease.  5/18  DLBCL mediport with (port cancelled today due to fever and elevated wbc --ID cleared      pt for mediport                -MRI- neg for Lep dx           Rrituxan reaction --substernal chest discomfort/fever/rigors --- solu-cortef/demerol               given with relief. ekg no change. trop neg - follow-up trop x2   5/19  Mediport placed. Chemo to cont  5/22  On chemo. Discharge after course of chemotherapy                  Dx:    Diffuse large B-cell lymphoma - stage 4 chemo started on 5/17               Mandibular swelling - sec to Lymphoma               Pleural effusion

## 2017-05-23 NOTE — DISCHARGE NOTE ADULT - ADDITIONAL INSTRUCTIONS
Follow up with your oncologist for follow up treatment within 1 week of discharge; follow up with PCP Follow up with your oncologist tomorrow for Neulasta injection; follow up with PCP

## 2017-05-23 NOTE — DISCHARGE NOTE ADULT - CARE PROVIDER_API CALL
Asaf Garcia (MD), Hematology; Internal Medicine; Medical Oncology  [address] [place] [address]  Phone: [phone]  Fax: [phone] Asaf Garcia), Hematology; Internal Medicine; Medical Oncology  [address] [place] [address]  Phone: [phone]  Fax: [phone]    Campos Ricks), Internal Medicine; Medical Oncology  [address] [place] [address]  Phone: [phone]  Fax: [phone]

## 2017-05-23 NOTE — DISCHARGE NOTE ADULT - MEDICATION SUMMARY - MEDICATIONS TO STOP TAKING
I will STOP taking the medications listed below when I get home from the hospital:    Augmentin 875 mg-125 mg oral tablet  -- 1 tab(s) by mouth every 12 hours

## 2017-05-23 NOTE — DISCHARGE NOTE ADULT - MEDICATION SUMMARY - MEDICATIONS TO TAKE
I will START or STAY ON the medications listed below when I get home from the hospital:    prochlorperazine 10 mg oral tablet  -- 1 tab(s) by mouth 3 times a day, As Needed  -- Indication: For Prophylactic measure    Zofran 4 mg oral tablet  -- 1 tab(s) by mouth every 8 hours, As Needed  -- Indication: For Prophylactic measure    allopurinol 300 mg oral tablet  -- 1 tab(s) by mouth 2 times a day (after meals)  -- Indication: For gout    acyclovir 400 mg oral tablet  -- 1 tab(s) by mouth 2 times a day  -- Indication: For Prophylactic measure    sulfamethoxazole-trimethoprim 800 mg-160 mg oral tablet  -- 1 tab(s) by mouth   -- Indication: For Prophylactic measure

## 2017-05-23 NOTE — DISCHARGE NOTE ADULT - CARE PLAN
Principal Discharge DX:	Lymphoma  Goal:	chemotherapy initiated  Instructions for follow-up, activity and diet:	Follow up with your oncologist for follow up treatment within 1 week  Secondary Diagnosis:	Tachycardia  Goal:	resolved Principal Discharge DX:	Lymphoma  Goal:	chemotherapy initiated  Instructions for follow-up, activity and diet:	Follow up with your oncologist tomorrow for Neulasta injection in the office post chemotherapy  Secondary Diagnosis:	Tachycardia  Goal:	resolved

## 2017-05-23 NOTE — DISCHARGE NOTE ADULT - PATIENT PORTAL LINK FT
“You can access the Follow[employer] Patient Portal, offered by [facility], by registering with the following website: [email or web address]”

## 2017-05-23 NOTE — DISCHARGE NOTE ADULT - PLAN OF CARE
chemotherapy initiated Follow up with your oncologist for follow up treatment within 1 week resolved Follow up with your oncologist tomorrow for Neulasta injection in the office post chemotherapy

## 2017-06-17 ENCOUNTER — INPATIENT (INPATIENT)
Facility: HOSPITAL | Age: 77
LOS: 3 days | Discharge: HOME | DRG: 847 | End: 2017-06-21
Attending: INTERNAL MEDICINE | Admitting: INTERNAL MEDICINE
Payer: MEDICARE

## 2017-06-17 VITALS
HEART RATE: 104 BPM | WEIGHT: 127.21 LBS | TEMPERATURE: 98 F | OXYGEN SATURATION: 97 % | DIASTOLIC BLOOD PRESSURE: 73 MMHG | SYSTOLIC BLOOD PRESSURE: 113 MMHG | RESPIRATION RATE: 16 BRPM | HEIGHT: 66 IN

## 2017-06-17 DIAGNOSIS — Z86.018 PERSONAL HISTORY OF OTHER BENIGN NEOPLASM: Chronic | ICD-10-CM

## 2017-06-17 DIAGNOSIS — C85.99 NON-HODGKIN LYMPHOMA, UNSPECIFIED, EXTRANODAL AND SOLID ORGAN SITES: ICD-10-CM

## 2017-06-17 DIAGNOSIS — Z98.890 OTHER SPECIFIED POSTPROCEDURAL STATES: Chronic | ICD-10-CM

## 2017-06-17 DIAGNOSIS — D27.9 BENIGN NEOPLASM OF UNSPECIFIED OVARY: Chronic | ICD-10-CM

## 2017-06-17 LAB
ALBUMIN SERPL ELPH-MCNC: 3.7 G/DL — SIGNIFICANT CHANGE UP (ref 3.3–5)
ALP SERPL-CCNC: 58 U/L — SIGNIFICANT CHANGE UP (ref 40–120)
ALT FLD-CCNC: 32 U/L RC — SIGNIFICANT CHANGE UP (ref 10–45)
ANION GAP SERPL CALC-SCNC: 13 MMOL/L — SIGNIFICANT CHANGE UP (ref 5–17)
AST SERPL-CCNC: 21 U/L — SIGNIFICANT CHANGE UP (ref 10–40)
BASOPHILS # BLD AUTO: 0.1 K/UL — SIGNIFICANT CHANGE UP (ref 0–0.2)
BASOPHILS NFR BLD AUTO: 0.8 % — SIGNIFICANT CHANGE UP (ref 0–2)
BILIRUB SERPL-MCNC: 0.3 MG/DL — SIGNIFICANT CHANGE UP (ref 0.2–1.2)
BUN SERPL-MCNC: 20 MG/DL — SIGNIFICANT CHANGE UP (ref 7–23)
CALCIUM SERPL-MCNC: 9.1 MG/DL — SIGNIFICANT CHANGE UP (ref 8.4–10.5)
CHLORIDE SERPL-SCNC: 102 MMOL/L — SIGNIFICANT CHANGE UP (ref 96–108)
CO2 SERPL-SCNC: 24 MMOL/L — SIGNIFICANT CHANGE UP (ref 22–31)
CREAT SERPL-MCNC: 0.99 MG/DL — SIGNIFICANT CHANGE UP (ref 0.5–1.3)
EOSINOPHIL # BLD AUTO: 0.1 K/UL — SIGNIFICANT CHANGE UP (ref 0–0.5)
EOSINOPHIL NFR BLD AUTO: 0.9 % — SIGNIFICANT CHANGE UP (ref 0–6)
GLUCOSE SERPL-MCNC: 96 MG/DL — SIGNIFICANT CHANGE UP (ref 70–99)
HCT VFR BLD CALC: 29.7 % — LOW (ref 34.5–45)
HGB BLD-MCNC: 10.1 G/DL — LOW (ref 11.5–15.5)
LDH SERPL L TO P-CCNC: 240 U/L — SIGNIFICANT CHANGE UP (ref 50–242)
LYMPHOCYTES # BLD AUTO: 1.4 K/UL — SIGNIFICANT CHANGE UP (ref 1–3.3)
LYMPHOCYTES # BLD AUTO: 16.8 % — SIGNIFICANT CHANGE UP (ref 13–44)
MCHC RBC-ENTMCNC: 32.6 PG — SIGNIFICANT CHANGE UP (ref 27–34)
MCHC RBC-ENTMCNC: 33.9 GM/DL — SIGNIFICANT CHANGE UP (ref 32–36)
MCV RBC AUTO: 96.1 FL — SIGNIFICANT CHANGE UP (ref 80–100)
MONOCYTES # BLD AUTO: 0.8 K/UL — SIGNIFICANT CHANGE UP (ref 0–0.9)
MONOCYTES NFR BLD AUTO: 9 % — SIGNIFICANT CHANGE UP (ref 2–14)
NEUTROPHILS # BLD AUTO: 6.1 K/UL — SIGNIFICANT CHANGE UP (ref 1.8–7.4)
NEUTROPHILS NFR BLD AUTO: 72.5 % — SIGNIFICANT CHANGE UP (ref 43–77)
PLATELET # BLD AUTO: 410 K/UL — HIGH (ref 150–400)
POTASSIUM SERPL-MCNC: 4.4 MMOL/L — SIGNIFICANT CHANGE UP (ref 3.5–5.3)
POTASSIUM SERPL-SCNC: 4.4 MMOL/L — SIGNIFICANT CHANGE UP (ref 3.5–5.3)
PROT SERPL-MCNC: 6 G/DL — SIGNIFICANT CHANGE UP (ref 6–8.3)
RBC # BLD: 3.09 M/UL — LOW (ref 3.8–5.2)
RBC # FLD: 18.5 % — HIGH (ref 10.3–14.5)
SODIUM SERPL-SCNC: 139 MMOL/L — SIGNIFICANT CHANGE UP (ref 135–145)
URATE SERPL-MCNC: 1.7 MG/DL — LOW (ref 2.5–7)
WBC # BLD: 8.4 K/UL — SIGNIFICANT CHANGE UP (ref 3.8–10.5)
WBC # FLD AUTO: 8.4 K/UL — SIGNIFICANT CHANGE UP (ref 3.8–10.5)

## 2017-06-17 RX ORDER — DOXORUBICIN HYDROCHLORIDE 2 MG/ML
16.5 INJECTION, SOLUTION INTRAVENOUS DAILY
Qty: 0 | Refills: 0 | Status: DISCONTINUED | OUTPATIENT
Start: 2017-06-17 | End: 2017-06-21

## 2017-06-17 RX ORDER — FAMOTIDINE 10 MG/ML
20 INJECTION INTRAVENOUS DAILY
Qty: 0 | Refills: 0 | Status: COMPLETED | OUTPATIENT
Start: 2017-06-17 | End: 2017-06-21

## 2017-06-17 RX ORDER — DIPHENHYDRAMINE HCL 50 MG
25 CAPSULE ORAL ONCE
Qty: 0 | Refills: 0 | Status: COMPLETED | OUTPATIENT
Start: 2017-06-17 | End: 2017-06-17

## 2017-06-17 RX ORDER — SODIUM CHLORIDE 9 MG/ML
1000 INJECTION, SOLUTION INTRAVENOUS
Qty: 0 | Refills: 0 | Status: DISCONTINUED | OUTPATIENT
Start: 2017-06-17 | End: 2017-06-21

## 2017-06-17 RX ORDER — ACYCLOVIR SODIUM 500 MG
400 VIAL (EA) INTRAVENOUS ONCE
Qty: 0 | Refills: 0 | Status: COMPLETED | OUTPATIENT
Start: 2017-06-17 | End: 2017-06-17

## 2017-06-17 RX ORDER — ONDANSETRON 8 MG/1
16 TABLET, FILM COATED ORAL DAILY
Qty: 0 | Refills: 0 | Status: DISCONTINUED | OUTPATIENT
Start: 2017-06-17 | End: 2017-06-17

## 2017-06-17 RX ORDER — RITUXIMAB 10 MG/ML
618.75 INJECTION, SOLUTION INTRAVENOUS ONCE
Qty: 0 | Refills: 0 | Status: COMPLETED | OUTPATIENT
Start: 2017-06-17 | End: 2017-06-20

## 2017-06-17 RX ORDER — ACETAMINOPHEN 500 MG
650 TABLET ORAL ONCE
Qty: 0 | Refills: 0 | Status: COMPLETED | OUTPATIENT
Start: 2017-06-17 | End: 2017-06-17

## 2017-06-17 RX ORDER — ETOPOSIDE 20 MG/ML
82.5 VIAL (ML) INTRAVENOUS DAILY
Qty: 0 | Refills: 0 | Status: DISCONTINUED | OUTPATIENT
Start: 2017-06-17 | End: 2017-06-17

## 2017-06-17 RX ORDER — FOSAPREPITANT DIMEGLUMINE 150 MG/5ML
150 INJECTION, POWDER, LYOPHILIZED, FOR SOLUTION INTRAVENOUS ONCE
Qty: 0 | Refills: 0 | Status: COMPLETED | OUTPATIENT
Start: 2017-06-17 | End: 2017-06-17

## 2017-06-17 RX ORDER — ONDANSETRON 8 MG/1
16 TABLET, FILM COATED ORAL DAILY
Qty: 0 | Refills: 0 | Status: DISCONTINUED | OUTPATIENT
Start: 2017-06-17 | End: 2017-06-21

## 2017-06-17 RX ORDER — ETOPOSIDE 20 MG/ML
82.5 VIAL (ML) INTRAVENOUS DAILY
Qty: 0 | Refills: 0 | Status: DISCONTINUED | OUTPATIENT
Start: 2017-06-17 | End: 2017-06-21

## 2017-06-17 RX ORDER — HYDROCORTISONE 20 MG
50 TABLET ORAL ONCE
Qty: 0 | Refills: 0 | Status: COMPLETED | OUTPATIENT
Start: 2017-06-17 | End: 2017-06-17

## 2017-06-17 RX ORDER — ALLOPURINOL 300 MG
300 TABLET ORAL DAILY
Qty: 0 | Refills: 0 | Status: DISCONTINUED | OUTPATIENT
Start: 2017-06-17 | End: 2017-06-21

## 2017-06-17 RX ADMIN — FOSAPREPITANT DIMEGLUMINE 300 MILLIGRAM(S): 150 INJECTION, POWDER, LYOPHILIZED, FOR SOLUTION INTRAVENOUS at 16:35

## 2017-06-17 RX ADMIN — Medication 50 MILLIGRAM(S): at 17:58

## 2017-06-17 RX ADMIN — Medication 300 MILLIGRAM(S): at 12:12

## 2017-06-17 RX ADMIN — Medication 50 MILLIGRAM(S): at 12:44

## 2017-06-17 RX ADMIN — FAMOTIDINE 100 MILLIGRAM(S): 10 INJECTION INTRAVENOUS at 12:44

## 2017-06-17 RX ADMIN — Medication 400 MILLIGRAM(S): at 23:40

## 2017-06-17 RX ADMIN — ONDANSETRON 116 MILLIGRAM(S): 8 TABLET, FILM COATED ORAL at 16:33

## 2017-06-17 RX ADMIN — Medication 650 MILLIGRAM(S): at 12:44

## 2017-06-17 RX ADMIN — SODIUM CHLORIDE 50 MILLILITER(S): 9 INJECTION, SOLUTION INTRAVENOUS at 17:58

## 2017-06-17 RX ADMIN — SODIUM CHLORIDE 50 MILLILITER(S): 9 INJECTION, SOLUTION INTRAVENOUS at 12:43

## 2017-06-17 RX ADMIN — Medication 25 MILLIGRAM(S): at 12:34

## 2017-06-17 NOTE — H&P ADULT - NSHPLABSRESULTS_GEN_ALL_CORE
10.1   8.4   )-----------( 410      ( 17 Jun 2017 11:34 )             29.7       06-17    139  |  102  |  20  ----------------------------<  96  4.4   |  24  |  0.99    Ca    9.1      17 Jun 2017 11:34    TPro  6.0  /  Alb  3.7  /  TBili  0.3  /  DBili  x   /  AST  21  /  ALT  32  /  AlkPhos  58  06-17                      Lactate Trend            CAPILLARY BLOOD GLUCOSE

## 2017-06-17 NOTE — PROGRESS NOTE ADULT - SUBJECTIVE AND OBJECTIVE BOX
Pt is seen and examined  admitted for  2nd cycle EPOCH Rituxan for high grade B cell lymphoma  Had infusion rxn to Rituxan last cycle but was able to  complete the infusion  Otherwise tolerated therapy well.  pt seems comfortable and denies any complaints at this time        MEDICATIONS  (STANDING):  Allopurinol  Acyclovir  Bactrim    MEDICATIONS  (PRN):      Allergies    No Known Allergies    Intolerances        Vital Signs Last 24 Hrs  T(C): 36.7, Max: 36.7 (06-17 @ 09:33)  T(F): 98, Max: 98 (06-17 @ 09:33)  HR: 104 (104 - 104)  BP: 113/73 (113/73 - 113/73)  BP(mean): --  RR: 16 (16 - 16)  SpO2: 97% (97% - 97%)    PHYSICAL EXAM  General: adult in NAD  HEENT: clear oropharynx, anicteric sclera, pink conjunctiva  Neck: supple  CV: normal S1/S2 with no murmur rubs or gallops  Lungs: positive air movement b/l ant lungs,clear to auscultation, no wheezes, no rales  Abdomen: soft non-tender non-distended, no hepatosplenomegaly  Ext: no clubbing cyanosis or edema  Skin: no rashes and no petechiae  Neuro: alert and oriented X 4, no focal deficits  LABS:  Pending    Impression and Plan:  High Grade non-Hodgkin's Lymphoma for second cycle EPOCH/Rituxan  Had infusion reaction to Rituxan first cycle  Monitor closely with premeds                                              BLOOD SMEAR INTERPRETATION:       RADIOLOGY & ADDITIONAL STUDIES:

## 2017-06-17 NOTE — H&P ADULT - NSHPREVIEWOFSYSTEMS_GEN_ALL_CORE
REVIEW OF SYSTEMS:    CONSTITUTIONAL: No weakness, fevers or chills  EYES/ENT: No visual changes;  No vertigo or throat pain Mouth discomfort  NECK: No pain or stiffness  RESPIRATORY: No cough, wheezing, hemoptysis; No shortness of breath  CARDIOVASCULAR: No chest pain or palpitations  GASTROINTESTINAL: No abdominal or epigastric pain. No nausea, vomiting, or hematemesis; No diarrhea or constipation. No melena or hematochezia.  GENITOURINARY: No dysuria, frequency or hematuria  NEUROLOGICAL: No numbness or weakness  SKIN: No itching, burning, rashes, or lesions   All other review of systems is negative unless indicated above.

## 2017-06-17 NOTE — ADVANCED PRACTICE NURSE CONSULT - ASSESSMENT
Cycle 2, day 1/5,Height and weight verified. Lab results as per sunrise, [place] aware of same. Vital signs stable prior to chemotherapy, and  within accepectable parameters, see sunrise. Pt educated on the importance of saving urine, verbalizes good understanding. Pt education reinforced chemo regimen, drug effects and potential side effects, written materials provided, pt states understanding. premed w/ tylenol 650 mg po benadryl 25 mg ivss hydrocortisone 50 mg ivssRituxan 375 mg/k5=099.75 mg ivss infused @ 50 ml/hr increased in increments of 50 ml/hr to max rate of 400 ml/hr until completion vss throughout monitored q30 min bp 107-121/75 HR 79-93 P>O 96% room air T 97.8-98.4etoposide 50 mg/m2=82.5 mg civi@23 ml/hr vincristine 0.4 mg/m2=0.66 mg civi @ 23 ml/hr doxorubicin 10 mg/m2=16.5 mg civi @ 23 ml/hr

## 2017-06-17 NOTE — H&P ADULT - HISTORY OF PRESENT ILLNESS
76 female admitted for  2nd cycle EPOCH Rituxan for high grade B cell lymphoma  Had infusion rxn to Rituxan last cycle but was able to  complete the infusion  Otherwise tolerated therapy well.  pt seems comfortable and denies any complaints at this time

## 2017-06-18 DIAGNOSIS — C83.30 DIFFUSE LARGE B-CELL LYMPHOMA, UNSPECIFIED SITE: ICD-10-CM

## 2017-06-18 LAB
ALBUMIN SERPL ELPH-MCNC: 3.7 G/DL — SIGNIFICANT CHANGE UP (ref 3.3–5)
ALP SERPL-CCNC: 55 U/L — SIGNIFICANT CHANGE UP (ref 40–120)
ALT FLD-CCNC: 30 U/L RC — SIGNIFICANT CHANGE UP (ref 10–45)
ANION GAP SERPL CALC-SCNC: 13 MMOL/L — SIGNIFICANT CHANGE UP (ref 5–17)
AST SERPL-CCNC: 21 U/L — SIGNIFICANT CHANGE UP (ref 10–40)
BASOPHILS # BLD AUTO: 0 K/UL — SIGNIFICANT CHANGE UP (ref 0–0.2)
BASOPHILS NFR BLD AUTO: 0.1 % — SIGNIFICANT CHANGE UP (ref 0–2)
BILIRUB SERPL-MCNC: 0.3 MG/DL — SIGNIFICANT CHANGE UP (ref 0.2–1.2)
BUN SERPL-MCNC: 19 MG/DL — SIGNIFICANT CHANGE UP (ref 7–23)
CALCIUM SERPL-MCNC: 9 MG/DL — SIGNIFICANT CHANGE UP (ref 8.4–10.5)
CHLORIDE SERPL-SCNC: 106 MMOL/L — SIGNIFICANT CHANGE UP (ref 96–108)
CO2 SERPL-SCNC: 22 MMOL/L — SIGNIFICANT CHANGE UP (ref 22–31)
CREAT SERPL-MCNC: 0.84 MG/DL — SIGNIFICANT CHANGE UP (ref 0.5–1.3)
EOSINOPHIL # BLD AUTO: 0 K/UL — SIGNIFICANT CHANGE UP (ref 0–0.5)
EOSINOPHIL NFR BLD AUTO: 0.1 % — SIGNIFICANT CHANGE UP (ref 0–6)
GLUCOSE SERPL-MCNC: 130 MG/DL — HIGH (ref 70–99)
HCT VFR BLD CALC: 31.5 % — LOW (ref 34.5–45)
HGB BLD-MCNC: 10.1 G/DL — LOW (ref 11.5–15.5)
LDH SERPL L TO P-CCNC: 241 U/L — SIGNIFICANT CHANGE UP (ref 50–242)
LYMPHOCYTES # BLD AUTO: 0.7 K/UL — LOW (ref 1–3.3)
LYMPHOCYTES # BLD AUTO: 7.1 % — LOW (ref 13–44)
MCHC RBC-ENTMCNC: 31 PG — SIGNIFICANT CHANGE UP (ref 27–34)
MCHC RBC-ENTMCNC: 32.1 GM/DL — SIGNIFICANT CHANGE UP (ref 32–36)
MCV RBC AUTO: 96.3 FL — SIGNIFICANT CHANGE UP (ref 80–100)
MONOCYTES # BLD AUTO: 0.4 K/UL — SIGNIFICANT CHANGE UP (ref 0–0.9)
MONOCYTES NFR BLD AUTO: 4.6 % — SIGNIFICANT CHANGE UP (ref 2–14)
NEUTROPHILS # BLD AUTO: 8.1 K/UL — HIGH (ref 1.8–7.4)
NEUTROPHILS NFR BLD AUTO: 88.1 % — HIGH (ref 43–77)
PLATELET # BLD AUTO: 388 K/UL — SIGNIFICANT CHANGE UP (ref 150–400)
POTASSIUM SERPL-MCNC: 4.1 MMOL/L — SIGNIFICANT CHANGE UP (ref 3.5–5.3)
POTASSIUM SERPL-SCNC: 4.1 MMOL/L — SIGNIFICANT CHANGE UP (ref 3.5–5.3)
PROT SERPL-MCNC: 6.1 G/DL — SIGNIFICANT CHANGE UP (ref 6–8.3)
RBC # BLD: 3.27 M/UL — LOW (ref 3.8–5.2)
RBC # FLD: 18.6 % — HIGH (ref 10.3–14.5)
SODIUM SERPL-SCNC: 141 MMOL/L — SIGNIFICANT CHANGE UP (ref 135–145)
URATE SERPL-MCNC: 1.9 MG/DL — LOW (ref 2.5–7)
WBC # BLD: 9.2 K/UL — SIGNIFICANT CHANGE UP (ref 3.8–10.5)
WBC # FLD AUTO: 9.2 K/UL — SIGNIFICANT CHANGE UP (ref 3.8–10.5)

## 2017-06-18 RX ORDER — ACYCLOVIR SODIUM 500 MG
400 VIAL (EA) INTRAVENOUS EVERY OTHER DAY
Qty: 0 | Refills: 0 | Status: DISCONTINUED | OUTPATIENT
Start: 2017-06-18 | End: 2017-06-18

## 2017-06-18 RX ORDER — ACYCLOVIR SODIUM 500 MG
400 VIAL (EA) INTRAVENOUS
Qty: 0 | Refills: 0 | Status: DISCONTINUED | OUTPATIENT
Start: 2017-06-18 | End: 2017-06-21

## 2017-06-18 RX ADMIN — FAMOTIDINE 100 MILLIGRAM(S): 10 INJECTION INTRAVENOUS at 16:17

## 2017-06-18 RX ADMIN — Medication 50 MILLIGRAM(S): at 18:52

## 2017-06-18 RX ADMIN — Medication 50 MILLIGRAM(S): at 06:50

## 2017-06-18 RX ADMIN — Medication 300 MILLIGRAM(S): at 06:50

## 2017-06-18 RX ADMIN — ONDANSETRON 116 MILLIGRAM(S): 8 TABLET, FILM COATED ORAL at 16:16

## 2017-06-18 RX ADMIN — SODIUM CHLORIDE 50 MILLILITER(S): 9 INJECTION, SOLUTION INTRAVENOUS at 12:25

## 2017-06-18 NOTE — PROGRESS NOTE ADULT - SUBJECTIVE AND OBJECTIVE BOX
Patient is a 76y old  Female who presents with a chief complaint of     SUBJECTIVE / OVERNIGHT EVENTS : comfortable, no new complaints  Review of Systems  chest pain no  palpitations no  sob no  nausea no  headache no    MEDICATIONS  (STANDING):  famotidine  IVPB 20milliGRAM(s) IV Intermittent daily  allopurinol 300milliGRAM(s) Oral daily  riTUXimab IVPB 618.75milliGRAM(s) IV Intermittent once  dextrose 5% + sodium chloride 0.45%. 1000milliLiter(s) IV Continuous <Continuous>  predniSONE   Tablet 50milliGRAM(s) Oral two times a day  DOXOrubicin IVPB w/vinCRIStine 16.5milliGRAM(s) IV Intermittent daily  ondansetron  IVPB 16milliGRAM(s) IV Intermittent daily  etoposide IVPB 82.5milliGRAM(s) IV Intermittent daily    MEDICATIONS  (PRN):      Vital Signs Last 24 Hrs  T(C): 37.4, Max: 37.4 (06-18 @ 08:21)  HR: 80 (80 - 93)  BP: 101/62 (101/62 - 108/69)  RR: 18 (17 - 18)  SpO2: 97% (96% - 97%)  Wt(kg): --  CAPILLARY BLOOD GLUCOSE    I&O's Summary    I & Os for current day (as of 18 Jun 2017 10:53)  =============================================  IN: 2557 ml / OUT: 1800 ml / NET: 757 ml      PHYSICAL EXAM:  GENERAL: NAD, well-developed  HEAD:  Atraumatic, Normocephalic  EYES: EOMI, PERRLA, conjunctiva and sclera clear  NECK: Supple, No JVD  CHEST/LUNG: Clear to auscultation bilaterally; No wheeze  HEART: Regular rate and rhythm; No murmurs, rubs, or gallops  ABDOMEN: Soft, Nontender, Nondistended; Bowel sounds present  EXTREMITIES:  2+ Peripheral Pulses, No clubbing, cyanosis, or edema  PSYCH: AAOx3  NEUROLOGY: non-focal  SKIN: No rashes or lesions    LABS:                        10.1   9.2   )-----------( 388      ( 18 Jun 2017 07:17 )             31.5     06-18    141  |  106  |  19  ----------------------------<  130<H>  4.1   |  22  |  0.84    Ca    9.0      18 Jun 2017 07:17    TPro  6.1  /  Alb  3.7  /  TBili  0.3  /  DBili  x   /  AST  21  /  ALT  30  /  AlkPhos  55  06-18              RADIOLOGY & ADDITIONAL TESTS:    Imaging Personally Reviewed:    Consultant(s) Notes Reviewed:      Care Discussed with Consultants/Other Providers:

## 2017-06-18 NOTE — ADVANCED PRACTICE NURSE CONSULT - ASSESSMENT
Pt. seen in bed a/ox4,denies any discomfort at this time.Pt. verbalized understanding of chemotherapy infusion,its indications and possible side effects.Pt. has right chest wall mediport.Dsg dry and intact,site with no s/s of redness/swelling or pain.Drug verification done by 2 RN.'s.Pt. received zofran 16 mg IV and pepcid 20 mg .Lab values seen by attending on rounds.At 1630 Day 2/4 etoposide 50mg/m2=82.5 mg IV continous infusing over 24 hours to mediport via alaris pump at 22.9/ml and Day 2/4 Doxorubicin 10mg/m2=16.5 mg with vincristine 0.4 mg/m2=0.66mg IV continuous to mediport via alaris pump running over 24 hour at 22.9 ml/hr.Left pt. comfortable in bed.Primary RN aware of present treatment.

## 2017-06-18 NOTE — PROGRESS NOTE ADULT - SUBJECTIVE AND OBJECTIVE BOX
Pt is seen and examined  admitted for  2nd cycle EPOCH Rituxan for high grade B cell lymphoma  Had infusion rxn to Rituxan last cycle but was able to  complete the infusion  Otherwise tolerated therapy well.    Cycle 2 R-EPOCH Day #2. No acute events overnight. Tolerated Rituxan infusion well  pt seems comfortable and denies any complaints at this time. Denies any fever, chills, cp, sob, cough, nausea/vomiting/diarrhea/constipation. Ambulating and eating well. No LE swelling. Reports numbness to jaw improved.      MEDICATIONS  (STANDING):  Allopurinol  Acyclovir BID T,TH,Sat  Bactrim DS BID M,W,F    MEDICATIONS  (PRN):      Allergies    No Known Allergies    Intolerances    Vital Signs Last 24 Hrs  T(C): 37.4, Max: 37.4 (06-18 @ 08:21)  T(F): 99.4, Max: 99.4 (06-18 @ 08:21)  HR: 80 (80 - 104)  BP: 101/62 (101/62 - 113/73)  BP(mean): --  RR: 18 (16 - 18)  SpO2: 97% (96% - 97%)      PHYSICAL EXAM  General: adult in NAD, alert, cooperative, comfortable  HEENT: clear oropharynx, anicteric sclera, pink conjunctiva  Neck: supple  CV: normal S1/S2 with no murmur rubs or gallops  Lungs: positive air movement b/l ant lungs,clear to auscultation, no wheezes, no rales  Abdomen: soft non-tender non-distended, no hepatosplenomegaly  Ext: no clubbing cyanosis or edema  Skin: no rashes and no petechiae  Neuro: alert and oriented X 4, no focal deficits      LABS:                          10.1   9.2   )-----------( 388      ( 18 Jun 2017 07:17 )             31.5     06-18    141  |  106  |  19  ----------------------------<  130<H>  4.1   |  22  |  0.84    Ca    9.0      18 Jun 2017 07:17    TPro  6.1  /  Alb  3.7  /  TBili  0.3  /  DBili  x   /  AST  21  /  ALT  30  /  AlkPhos  55  06-18      Impression and Plan:  High Grade non-Hodgkin's Lymphoma, Double Hit for second cycle EPOCH/Rituxan  Had infusion reaction to Rituxan first cycle    1. DLBCL, Double Hit                                              BLOOD SMEAR INTERPRETATION:       RADIOLOGY & ADDITIONAL STUDIES:

## 2017-06-19 DIAGNOSIS — R60.0 LOCALIZED EDEMA: ICD-10-CM

## 2017-06-19 DIAGNOSIS — K59.03 DRUG INDUCED CONSTIPATION: ICD-10-CM

## 2017-06-19 LAB
ALBUMIN SERPL ELPH-MCNC: 3.5 G/DL — SIGNIFICANT CHANGE UP (ref 3.3–5)
ALP SERPL-CCNC: 55 U/L — SIGNIFICANT CHANGE UP (ref 40–120)
ALT FLD-CCNC: 30 U/L RC — SIGNIFICANT CHANGE UP (ref 10–45)
ANION GAP SERPL CALC-SCNC: 12 MMOL/L — SIGNIFICANT CHANGE UP (ref 5–17)
AST SERPL-CCNC: 20 U/L — SIGNIFICANT CHANGE UP (ref 10–40)
BASOPHILS # BLD AUTO: 0 K/UL — SIGNIFICANT CHANGE UP (ref 0–0.2)
BASOPHILS NFR BLD AUTO: 0.1 % — SIGNIFICANT CHANGE UP (ref 0–2)
BILIRUB SERPL-MCNC: 0.3 MG/DL — SIGNIFICANT CHANGE UP (ref 0.2–1.2)
BUN SERPL-MCNC: 20 MG/DL — SIGNIFICANT CHANGE UP (ref 7–23)
CALCIUM SERPL-MCNC: 9.1 MG/DL — SIGNIFICANT CHANGE UP (ref 8.4–10.5)
CHLORIDE SERPL-SCNC: 106 MMOL/L — SIGNIFICANT CHANGE UP (ref 96–108)
CO2 SERPL-SCNC: 23 MMOL/L — SIGNIFICANT CHANGE UP (ref 22–31)
CREAT SERPL-MCNC: 0.74 MG/DL — SIGNIFICANT CHANGE UP (ref 0.5–1.3)
EOSINOPHIL # BLD AUTO: 0 K/UL — SIGNIFICANT CHANGE UP (ref 0–0.5)
EOSINOPHIL NFR BLD AUTO: 0 % — SIGNIFICANT CHANGE UP (ref 0–6)
GLUCOSE SERPL-MCNC: 127 MG/DL — HIGH (ref 70–99)
HCT VFR BLD CALC: 32.5 % — LOW (ref 34.5–45)
HGB BLD-MCNC: 10.5 G/DL — LOW (ref 11.5–15.5)
LDH SERPL L TO P-CCNC: 232 U/L — SIGNIFICANT CHANGE UP (ref 50–242)
LYMPHOCYTES # BLD AUTO: 0.6 K/UL — LOW (ref 1–3.3)
LYMPHOCYTES # BLD AUTO: 5 % — LOW (ref 13–44)
MCHC RBC-ENTMCNC: 31.2 PG — SIGNIFICANT CHANGE UP (ref 27–34)
MCHC RBC-ENTMCNC: 32.3 GM/DL — SIGNIFICANT CHANGE UP (ref 32–36)
MCV RBC AUTO: 96.6 FL — SIGNIFICANT CHANGE UP (ref 80–100)
MONOCYTES # BLD AUTO: 0.4 K/UL — SIGNIFICANT CHANGE UP (ref 0–0.9)
MONOCYTES NFR BLD AUTO: 3.5 % — SIGNIFICANT CHANGE UP (ref 2–14)
NEUTROPHILS # BLD AUTO: 10.3 K/UL — HIGH (ref 1.8–7.4)
NEUTROPHILS NFR BLD AUTO: 91.4 % — HIGH (ref 43–77)
PLATELET # BLD AUTO: 342 K/UL — SIGNIFICANT CHANGE UP (ref 150–400)
POTASSIUM SERPL-MCNC: 4.2 MMOL/L — SIGNIFICANT CHANGE UP (ref 3.5–5.3)
POTASSIUM SERPL-SCNC: 4.2 MMOL/L — SIGNIFICANT CHANGE UP (ref 3.5–5.3)
PROT SERPL-MCNC: 6.3 G/DL — SIGNIFICANT CHANGE UP (ref 6–8.3)
RBC # BLD: 3.37 M/UL — LOW (ref 3.8–5.2)
RBC # FLD: 18.7 % — HIGH (ref 10.3–14.5)
SODIUM SERPL-SCNC: 141 MMOL/L — SIGNIFICANT CHANGE UP (ref 135–145)
URATE SERPL-MCNC: 2.3 MG/DL — LOW (ref 2.5–7)
WBC # BLD: 11.2 K/UL — HIGH (ref 3.8–10.5)
WBC # FLD AUTO: 11.2 K/UL — HIGH (ref 3.8–10.5)

## 2017-06-19 RX ADMIN — Medication 1 TABLET(S): at 21:12

## 2017-06-19 RX ADMIN — SODIUM CHLORIDE 50 MILLILITER(S): 9 INJECTION, SOLUTION INTRAVENOUS at 08:04

## 2017-06-19 RX ADMIN — Medication 1 TABLET(S): at 08:05

## 2017-06-19 RX ADMIN — ONDANSETRON 116 MILLIGRAM(S): 8 TABLET, FILM COATED ORAL at 14:36

## 2017-06-19 RX ADMIN — Medication 50 MILLIGRAM(S): at 17:44

## 2017-06-19 RX ADMIN — FAMOTIDINE 100 MILLIGRAM(S): 10 INJECTION INTRAVENOUS at 11:18

## 2017-06-19 RX ADMIN — Medication 300 MILLIGRAM(S): at 06:58

## 2017-06-19 RX ADMIN — Medication 50 MILLIGRAM(S): at 06:58

## 2017-06-19 NOTE — ADVANCED PRACTICE NURSE CONSULT - ASSESSMENT
Cycle # day 3/5 . Lab results as per Md gurpreet aware of same. Vital signs stable prior to the start of chemotherapy, see sunrise.  Pt educated on the importance of saving urine, verbalize understanding of same.Pt education done re chemo regimen, drug effects and potential side effects, written material provided, pt states understanding. .Pt with rt chest wall mediport site free from signs and symptoms of infection, positive blood return obtained. Pre-medicated with Cycle # day 3/5 . Lab results as per Md gurpreet aware of same. Vital signs stable prior to the start of chemotherapy, see sunrise.  Pt educated on the importance of saving urine, verbalize understanding of same.Pt education done re chemo regimen, drug effects and potential side effects, written material provided, pt states understanding. .Pt with rt chest wall mediport site free from signs and symptoms of infection, positive blood return obtained.pt completed with her day 2continuous infusion  Pre-medicated with zofran 16mg ivss, pt received pepcid 20mg ivss by the area nurse pt started on etoposide 50mg/m2= 82.5mg iv continuous infusion at 23cc/hr and doxorubicin 10mg/m2=16.5mg mixed with vincristine 0.4mg/m2=0.66mg CI at 23cc/hr intiated at 1512 pt with no complaints pt ambulating in the hallway , voiding adequately report given to the area nurse Cycle # 2 day 3/5 . Lab results as per Md gurpreet aware of same. Vital signs stable prior to the start of chemotherapy, see sunrise.  Pt educated on the importance of saving urine, verbalize understanding of same.Pt education done re chemo regimen, drug effects and potential side effects, written material provided, pt states understanding. .Pt with rt chest wall mediport site free from signs and symptoms of infection, positive blood return obtained.pt completed with her day 2continuous infusion  Pre-medicated with zofran 16mg ivss, pt received pepcid 20mg ivss by the area nurse pt started on etoposide 50mg/m2= 82.5mg iv continuous infusion at 23cc/hr and doxorubicin 10mg/m2=16.5mg mixed with vincristine 0.4mg/m2=0.66mg CI at 23cc/hr intiated at 1512 pt with no complaints pt ambulating in the hallway , voiding adequately report given to the area nurse

## 2017-06-19 NOTE — PROGRESS NOTE ADULT - SUBJECTIVE AND OBJECTIVE BOX
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 No nausea, vomiting, or hematemesis; No diarrhea; (+) constipation. No melena or hematochezia.  GENITOURINARY: No dysuria, frequency or hematuria  NEUROLOGICAL: No numbness or weakness  SKIN: No itching, burning, rashes, or lesions     MEDICATIONS  (STANDING):  famotidine  IVPB 20milliGRAM(s) IV Intermittent daily  allopurinol 300milliGRAM(s) Oral daily  riTUXimab IVPB 618.75milliGRAM(s) IV Intermittent once  dextrose 5% + sodium chloride 0.45%. 1000milliLiter(s) IV Continuous <Continuous>  trimethoprim  160 mG/sulfamethoxazole 800 mG 1Tablet(s) Oral <User Schedule>  predniSONE   Tablet 50milliGRAM(s) Oral two times a day  DOXOrubicin IVPB w/vinCRIStine 16.5milliGRAM(s) IV Intermittent daily  ondansetron  IVPB 16milliGRAM(s) IV Intermittent daily  etoposide IVPB 82.5milliGRAM(s) IV Intermittent daily  acyclovir   Tablet 400milliGRAM(s) Oral <User Schedule>    MEDICATIONS  (PRN):      Allergies    No Known Allergies            Vital Signs Last 24 Hrs  T(C): 36.3, Max: 37.4 (06-18 @ 08:21)  T(F): 97.4, Max: 99.4 (06-18 @ 08:21)  HR: 77 (66 - 80)  BP: 122/77 (101/62 - 122/77)  BP(mean): --  RR: 20 (16 - 20)  SpO2: 97% (96% - 98%)  I/O I&O's Detail    I & Os for current day (as of 19 Jun 2017 08:24)  =============================================  IN:    Oral Fluid: 900 ml    dextrose 5% + sodium chloride 0.45%.: 600 ml    Total IN: 1500 ml  ---------------------------------------------  OUT:    Voided: 3000 ml    Total OUT: 3000 ml  ---------------------------------------------  Total NET: -1500 ml      PHYSICAL EXAM  General: adult in NAD  HEENT: clear oropharynx, anicteric sclera, pink conjunctiva  Neck: supple  CV: normal S1/S2 with no murmur rubs or gallops  Lungs: positive air movement b/l ant lungs,clear to auscultation, no wheezes, no rales  Abdomen: soft non-tender non-distended, no hepatosplenomegaly  Ext: no clubbing cyanosis or edema  Skin: no rashes and no petechiae  Neuro: alert and oriented X 4, no focal deficits  LABS:                          10.5   11.2  )-----------( 342      ( 19 Jun 2017 07:35 )             32.5         Mean Cell Volume : 96.6 fl  Mean Cell Hemoglobin : 31.2 pg  Mean Cell Hemoglobin Concentration : 32.3 gm/dL  Auto Neutrophil # : 10.3 K/uL  Auto Lymphocyte # : 0.6 K/uL  Auto Monocyte # : 0.4 K/uL  Auto Eosinophil # : 0.0 K/uL  Auto Basophil # : 0.0 K/uL  Auto Neutrophil % : 91.4 %  Auto Lymphocyte % : 5.0 %  Auto Monocyte % : 3.5 %  Auto Eosinophil % : 0.0 %  Auto Basophil % : 0.1 %    Serial CBC's  06-19 @ 07:35  Hct-32.5 / Hgb-10.5 / Plat-342 / RBC-3.37 / WBC-11.2          Serial CBC's  06-18 @ 07:17  Hct-31.5 / Hgb-10.1 / Plat-388 / RBC-3.27 / WBC-9.2          Serial CBC's  06-17 @ 11:34  Hct-29.7 / Hgb-10.1 / Plat-410 / RBC-3.09 / WBC-8.4            06-18    141  |  106  |  19  ----------------------------<  130<H>  4.1   |  22  |  0.84    Ca    9.0      18 Jun 2017 07:17    TPro  6.1  /  Alb  3.7  /  TBili  0.3  /  DBili  x   /  AST  21  /  ALT  30  /  AlkPhos  55  06-18          WBC Count: 11.2 K/uL (06-19 @ 07:35)  Hemoglobin: 10.5 g/dL (06-19 @ 07:35)  Hematocrit: 32.5 % (06-19 @ 07:35)  Platelet Count - Automated: 342 K/uL (06-19 @ 07:35)  WBC Count: 9.2 K/uL (06-18 @ 07:17)  Hemoglobin: 10.1 g/dL (06-18 @ 07:17)  Hematocrit: 31.5 % (06-18 @ 07:17)  Platelet Count - Automated: 388 K/uL (06-18 @ 07:17)  Lactate Dehydrogenase, Serum: 241 U/L (06-18 @ 07:17)  WBC Count: 8.4 K/uL (06-17 @ 11:34)  Hemoglobin: 10.1 g/dL (06-17 @ 11:34)  Hematocrit: 29.7 % (06-17 @ 11:34)  Platelet Count - Automated: 410 K/uL (06-17 @ 11:34)  Lactate Dehydrogenase, Serum: 240 U/L (06-17 @ 11:34)            Assessment

## 2017-06-19 NOTE — PROGRESS NOTE ADULT - SUBJECTIVE AND OBJECTIVE BOX
Patient is a 76y old  Female who presents with a chief complaint of     SUBJECTIVE / OVERNIGHT EVENTS:c/o constipation  Review of Systems  chest pain no  palpitations no  sob no  nausea no  headache no    MEDICATIONS  (STANDING):  famotidine  IVPB 20milliGRAM(s) IV Intermittent daily  allopurinol 300milliGRAM(s) Oral daily  riTUXimab IVPB 618.75milliGRAM(s) IV Intermittent once  dextrose 5% + sodium chloride 0.45%. 1000milliLiter(s) IV Continuous <Continuous>  trimethoprim  160 mG/sulfamethoxazole 800 mG 1Tablet(s) Oral <User Schedule>  predniSONE   Tablet 50milliGRAM(s) Oral two times a day  DOXOrubicin IVPB w/vinCRIStine 16.5milliGRAM(s) IV Intermittent daily  ondansetron  IVPB 16milliGRAM(s) IV Intermittent daily  etoposide IVPB 82.5milliGRAM(s) IV Intermittent daily  acyclovir   Tablet 400milliGRAM(s) Oral <User Schedule>    MEDICATIONS  (PRN):      Vital Signs Last 24 Hrs  T(C): 36.3, Max: 37 (06-18 @ 16:07)  HR: 77 (66 - 79)  BP: 122/77 (112/63 - 122/77)  RR: 20 (16 - 20)  SpO2: 97% (96% - 98%)  Wt(kg): --  CAPILLARY BLOOD GLUCOSE    I&O's Summary  I & Os for 24h ending 19 Jun 2017 07:00  =============================================  IN: 1500 ml / OUT: 3000 ml / NET: -1500 ml    I & Os for current day (as of 19 Jun 2017 11:19)  =============================================  IN: 260 ml / OUT: 150 ml / NET: 110 ml      PHYSICAL EXAM:  GENERAL: NAD, well-developed  HEAD:  Atraumatic, Normocephalic  EYES: EOMI, PERRLA, conjunctiva and sclera clear  NECK: Supple, No JVD  CHEST/LUNG: Clear to auscultation bilaterally; No wheeze  HEART: Regular rate and rhythm; No murmurs, rubs, or gallops  ABDOMEN: Soft, Nontender, Nondistended; Bowel sounds present  EXTREMITIES:  2+ Peripheral Pulses, No clubbing, cyanosis, or edema  PSYCH: AAOx3  NEUROLOGY: non-focal  SKIN: No rashes or lesions    LABS:                        10.5   11.2  )-----------( 342      ( 19 Jun 2017 07:35 )             32.5     06-19    141  |  106  |  20  ----------------------------<  127<H>  4.2   |  23  |  0.74    Ca    9.1      19 Jun 2017 07:35    TPro  6.3  /  Alb  3.5  /  TBili  0.3  /  DBili  x   /  AST  20  /  ALT  30  /  AlkPhos  55  06-19              RADIOLOGY & ADDITIONAL TESTS:    Imaging Personally Reviewed:    Consultant(s) Notes Reviewed:      Care Discussed with Consultants/Other Providers:

## 2017-06-20 LAB
ALBUMIN SERPL ELPH-MCNC: 3.6 G/DL — SIGNIFICANT CHANGE UP (ref 3.3–5)
ALP SERPL-CCNC: 49 U/L — SIGNIFICANT CHANGE UP (ref 40–120)
ALT FLD-CCNC: 23 U/L RC — SIGNIFICANT CHANGE UP (ref 10–45)
ANION GAP SERPL CALC-SCNC: 12 MMOL/L — SIGNIFICANT CHANGE UP (ref 5–17)
ANION GAP SERPL CALC-SCNC: 13 MMOL/L — SIGNIFICANT CHANGE UP (ref 5–17)
AST SERPL-CCNC: 19 U/L — SIGNIFICANT CHANGE UP (ref 10–40)
BASOPHILS # BLD AUTO: 0 K/UL — SIGNIFICANT CHANGE UP (ref 0–0.2)
BASOPHILS NFR BLD AUTO: 0 % — SIGNIFICANT CHANGE UP (ref 0–2)
BILIRUB SERPL-MCNC: 0.3 MG/DL — SIGNIFICANT CHANGE UP (ref 0.2–1.2)
BUN SERPL-MCNC: 17 MG/DL — SIGNIFICANT CHANGE UP (ref 7–23)
BUN SERPL-MCNC: 19 MG/DL — SIGNIFICANT CHANGE UP (ref 7–23)
CALCIUM SERPL-MCNC: 8.7 MG/DL — SIGNIFICANT CHANGE UP (ref 8.4–10.5)
CALCIUM SERPL-MCNC: 9.3 MG/DL — SIGNIFICANT CHANGE UP (ref 8.4–10.5)
CHLORIDE SERPL-SCNC: 105 MMOL/L — SIGNIFICANT CHANGE UP (ref 96–108)
CHLORIDE SERPL-SCNC: 108 MMOL/L — SIGNIFICANT CHANGE UP (ref 96–108)
CO2 SERPL-SCNC: 22 MMOL/L — SIGNIFICANT CHANGE UP (ref 22–31)
CO2 SERPL-SCNC: 24 MMOL/L — SIGNIFICANT CHANGE UP (ref 22–31)
CREAT SERPL-MCNC: 0.72 MG/DL — SIGNIFICANT CHANGE UP (ref 0.5–1.3)
CREAT SERPL-MCNC: 0.82 MG/DL — SIGNIFICANT CHANGE UP (ref 0.5–1.3)
EOSINOPHIL # BLD AUTO: 0 K/UL — SIGNIFICANT CHANGE UP (ref 0–0.5)
EOSINOPHIL NFR BLD AUTO: 0 % — SIGNIFICANT CHANGE UP (ref 0–6)
GLUCOSE SERPL-MCNC: 112 MG/DL — HIGH (ref 70–99)
GLUCOSE SERPL-MCNC: 118 MG/DL — HIGH (ref 70–99)
HCT VFR BLD CALC: 29.1 % — LOW (ref 34.5–45)
HCT VFR BLD CALC: 29.6 % — LOW (ref 34.5–45)
HGB BLD-MCNC: 9.4 G/DL — LOW (ref 11.5–15.5)
HGB BLD-MCNC: 9.9 G/DL — LOW (ref 11.5–15.5)
LDH SERPL L TO P-CCNC: 206 U/L — SIGNIFICANT CHANGE UP (ref 50–242)
LYMPHOCYTES # BLD AUTO: 0.7 K/UL — LOW (ref 1–3.3)
LYMPHOCYTES # BLD AUTO: 9.7 % — LOW (ref 13–44)
MCHC RBC-ENTMCNC: 29.8 PG — SIGNIFICANT CHANGE UP (ref 27–34)
MCHC RBC-ENTMCNC: 32.2 PG — SIGNIFICANT CHANGE UP (ref 27–34)
MCHC RBC-ENTMCNC: 32.3 GM/DL — SIGNIFICANT CHANGE UP (ref 32–36)
MCHC RBC-ENTMCNC: 33.4 GM/DL — SIGNIFICANT CHANGE UP (ref 32–36)
MCV RBC AUTO: 92.4 FL — SIGNIFICANT CHANGE UP (ref 80–100)
MCV RBC AUTO: 96.2 FL — SIGNIFICANT CHANGE UP (ref 80–100)
MONOCYTES # BLD AUTO: 0.4 K/UL — SIGNIFICANT CHANGE UP (ref 0–0.9)
MONOCYTES NFR BLD AUTO: 5.9 % — SIGNIFICANT CHANGE UP (ref 2–14)
NEUTROPHILS # BLD AUTO: 6.1 K/UL — SIGNIFICANT CHANGE UP (ref 1.8–7.4)
NEUTROPHILS NFR BLD AUTO: 84.4 % — HIGH (ref 43–77)
PLATELET # BLD AUTO: 278 K/UL — SIGNIFICANT CHANGE UP (ref 150–400)
PLATELET # BLD AUTO: 289 K/UL — SIGNIFICANT CHANGE UP (ref 150–400)
POTASSIUM SERPL-MCNC: 4.1 MMOL/L — SIGNIFICANT CHANGE UP (ref 3.5–5.3)
POTASSIUM SERPL-MCNC: 4.1 MMOL/L — SIGNIFICANT CHANGE UP (ref 3.5–5.3)
POTASSIUM SERPL-SCNC: 4.1 MMOL/L — SIGNIFICANT CHANGE UP (ref 3.5–5.3)
POTASSIUM SERPL-SCNC: 4.1 MMOL/L — SIGNIFICANT CHANGE UP (ref 3.5–5.3)
PROT SERPL-MCNC: 5.6 G/DL — LOW (ref 6–8.3)
RBC # BLD: 3.08 M/UL — LOW (ref 3.8–5.2)
RBC # BLD: 3.15 M/UL — LOW (ref 3.8–5.2)
RBC # FLD: 18.5 % — HIGH (ref 10.3–14.5)
RBC # FLD: 19.5 % — HIGH (ref 10.3–14.5)
SODIUM SERPL-SCNC: 142 MMOL/L — SIGNIFICANT CHANGE UP (ref 135–145)
SODIUM SERPL-SCNC: 142 MMOL/L — SIGNIFICANT CHANGE UP (ref 135–145)
URATE SERPL-MCNC: 2.5 MG/DL — SIGNIFICANT CHANGE UP (ref 2.5–7)
WBC # BLD: 7.19 K/UL — SIGNIFICANT CHANGE UP (ref 3.8–10.5)
WBC # BLD: 7.2 K/UL — SIGNIFICANT CHANGE UP (ref 3.8–10.5)
WBC # FLD AUTO: 7.19 K/UL — SIGNIFICANT CHANGE UP (ref 3.8–10.5)
WBC # FLD AUTO: 7.2 K/UL — SIGNIFICANT CHANGE UP (ref 3.8–10.5)

## 2017-06-20 RX ADMIN — Medication 300 MILLIGRAM(S): at 05:20

## 2017-06-20 RX ADMIN — Medication 400 MILLIGRAM(S): at 08:43

## 2017-06-20 RX ADMIN — Medication 50 MILLIGRAM(S): at 18:25

## 2017-06-20 RX ADMIN — Medication 50 MILLIGRAM(S): at 05:20

## 2017-06-20 RX ADMIN — Medication 400 MILLIGRAM(S): at 20:05

## 2017-06-20 RX ADMIN — FAMOTIDINE 100 MILLIGRAM(S): 10 INJECTION INTRAVENOUS at 13:35

## 2017-06-20 RX ADMIN — ONDANSETRON 116 MILLIGRAM(S): 8 TABLET, FILM COATED ORAL at 13:35

## 2017-06-20 NOTE — PROGRESS NOTE ADULT - SUBJECTIVE AND OBJECTIVE BOX
Patient is a 76y old  Female who presents with a chief complaint of     SUBJECTIVE / OVERNIGHT EVENTS:comfortable  Review of Systems  chest pain no  palpitations no  sob no  nausea no  headache no    MEDICATIONS  (STANDING):  famotidine  IVPB 20milliGRAM(s) IV Intermittent daily  allopurinol 300milliGRAM(s) Oral daily  dextrose 5% + sodium chloride 0.45%. 1000milliLiter(s) IV Continuous <Continuous>  trimethoprim  160 mG/sulfamethoxazole 800 mG 1Tablet(s) Oral <User Schedule>  predniSONE   Tablet 50milliGRAM(s) Oral two times a day  DOXOrubicin IVPB w/vinCRIStine 16.5milliGRAM(s) IV Intermittent daily  ondansetron  IVPB 16milliGRAM(s) IV Intermittent daily  etoposide IVPB 82.5milliGRAM(s) IV Intermittent daily  acyclovir   Tablet 400milliGRAM(s) Oral <User Schedule>    MEDICATIONS  (PRN):      Vital Signs Last 24 Hrs  T(C): 36.7, Max: 36.7 (06-19 @ 15:17)  HR: 70 (61 - 72)  BP: 129/79 (123/78 - 139/78)  RR: 20 (18 - 20)  SpO2: 98% (97% - 98%)  Wt(kg): --  CAPILLARY BLOOD GLUCOSE    I&O's Summary  I & Os for 24h ending 20 Jun 2017 07:00  =============================================  IN: 4836 ml / OUT: 2800 ml / NET: 2036 ml    I & Os for current day (as of 20 Jun 2017 13:30)  =============================================  IN: 1300 ml / OUT: 560 ml / NET: 740 ml      PHYSICAL EXAM:  GENERAL: NAD, well-developed  HEAD:  Atraumatic, Normocephalic  EYES: EOMI, PERRLA, conjunctiva and sclera clear  NECK: Supple, No JVD  CHEST/LUNG: Clear to auscultation bilaterally; No wheeze  HEART: Regular rate and rhythm; No murmurs, rubs, or gallops  ABDOMEN: Soft, Nontender, Nondistended; Bowel sounds present  EXTREMITIES:  2+ Peripheral Pulses, No clubbing, cyanosis, or edema  PSYCH: AAOx3  NEUROLOGY: non-focal  SKIN: No rashes or lesions    LABS:                        9.4    7.19  )-----------( 289      ( 20 Jun 2017 08:19 )             29.1     06-20    142  |  108  |  17  ----------------------------<  112<H>  4.1   |  22  |  0.72    Ca    8.7      20 Jun 2017 08:19    TPro  5.6<L>  /  Alb  3.6  /  TBili  0.3  /  DBili  x   /  AST  19  /  ALT  23  /  AlkPhos  49  06-20              RADIOLOGY & ADDITIONAL TESTS:    Imaging Personally Reviewed:    Consultant(s) Notes Reviewed:      Care Discussed with Consultants/Other Providers:

## 2017-06-20 NOTE — PROGRESS NOTE ADULT - SUBJECTIVE AND OBJECTIVE BOX
Patient is a 76y old  Female who presents for Cycle #2 R-EPOCH    Pt is seen and examined  pt is awake and out of bed to chair  pt seems comfortable and denies any complaints at this time; ambulating in hallway, (+) bm yesterday; no n/v, h/a, c/p, sob      REVIEW OF SYSTEMS:    CONSTITUTIONAL: No weakness, fevers or chills  EYES/ENT: No visual changes;  No vertigo or throat pain   NECK: No pain or stiffness  RESPIRATORY: No cough, wheezing, hemoptysis; No shortness of breath  CARDIOVASCULAR: No chest pain or palpitations  GASTROINTESTINAL: No abdominal or epigastric pain. No nausea, vomiting, or hematemesis; No diarrhea or constipation. No melena or hematochezia.  GENITOURINARY: No dysuria, frequency or hematuria  NEUROLOGICAL: No numbness or weakness  SKIN: No itching, burning, rashes, or lesions     MEDICATIONS  (STANDING):  famotidine  IVPB 20milliGRAM(s) IV Intermittent daily  allopurinol 300milliGRAM(s) Oral daily  riTUXimab IVPB 618.75milliGRAM(s) IV Intermittent once  dextrose 5% + sodium chloride 0.45%. 1000milliLiter(s) IV Continuous <Continuous>  trimethoprim  160 mG/sulfamethoxazole 800 mG 1Tablet(s) Oral <User Schedule>  predniSONE   Tablet 50milliGRAM(s) Oral two times a day  DOXOrubicin IVPB w/vinCRIStine 16.5milliGRAM(s) IV Intermittent daily  ondansetron  IVPB 16milliGRAM(s) IV Intermittent daily  etoposide IVPB 82.5milliGRAM(s) IV Intermittent daily  acyclovir   Tablet 400milliGRAM(s) Oral <User Schedule>        Allergies    No Known Allergies    Intolerances          PHYSICAL EXAM  Vital Signs Last 24 Hrs  T(C): 36.7, Max: 36.7 (06-19 @ 15:17)  T(F): 98.1, Max: 98.1 (06-20 @ 07:41)  HR: 70 (61 - 72)  BP: 129/79 (123/78 - 139/78)  BP(mean): --  RR: 20 (18 - 20)  SpO2: 98% (97% - 98%)  General: adult in NAD  HEENT: clear oropharynx, anicteric sclera, pink conjunctiva  Neck: supple  CV: normal S1/S2 with no murmur rubs or gallops  Lungs: positive air movement b/l ant lungs,clear to auscultation, no wheezes, no rales  Abdomen: soft non-tender non-distended, no hepatosplenomegaly  Ext: no clubbing, cyanosis; 1(+) edema  Skin: no rashes and no petechiae  Neuro: alert and oriented X 4, no focal deficits  LABS:                          9.9    7.2   )-----------( 278      ( 20 Jun 2017 07:02 )             29.6         Mean Cell Volume : 96.2 fl  Mean Cell Hemoglobin : 32.2 pg  Mean Cell Hemoglobin Concentration : 33.4 gm/dL  Auto Neutrophil # : 6.1 K/uL  Auto Lymphocyte # : 0.7 K/uL  Auto Monocyte # : 0.4 K/uL  Auto Eosinophil # : 0.0 K/uL  Auto Basophil # : 0.0 K/uL  Auto Neutrophil % : 84.4 %  Auto Lymphocyte % : 9.7 %  Auto Monocyte % : 5.9 %  Auto Eosinophil % : 0.0 %  Auto Basophil % : 0.0 %    Serial CBC's  06-20 @ 07:02  Hct-29.6 / Hgb-9.9 / Plat-278 / RBC-3.08 / WBC-7.2      Serial CBC's  06-19 @ 07:35  Hct-32.5 / Hgb-10.5 / Plat-342 / RBC-3.37 / WBC-11.2      Serial CBC's  06-18 @ 07:17  Hct-31.5 / Hgb-10.1 / Plat-388 / RBC-3.27 / WBC-9.2      Serial CBC's  06-17 @ 11:34  Hct-29.7 / Hgb-10.1 / Plat-410 / RBC-3.09 / WBC-8.4        06-20    142  |  105  |  19  ----------------------------<  118<H>  4.1   |  24  |  0.82    Ca    9.3      20 Jun 2017 07:02    TPro  5.6<L>  /  Alb  3.6  /  TBili  0.3  /  DBili  x   /  AST  19  /  ALT  23  /  AlkPhos  49  06-20          Assessment

## 2017-06-20 NOTE — ADVANCED PRACTICE NURSE CONSULT - ASSESSMENT
Cycle 2, day ,Height and weight verified. Lab results as per Md gurpreet aware of same. Vital signs stable prior to chemotherapy, and  within accepectable parameters, see sunrise. Pt educated on the importance of saving urine, verbalizes good understanding. Pt education  reinforced chemo regimen, drug effects and potential side effects, pt states understanding. Reports that she has been tolerating chemotherapy well without side effects. Pt with RCW mediport   line, site free from signs and symptoms of infection, good blood return obtained. Pre- medicated with pepcid 20 mg ivss zofran 16 mg ivss etoposide 50 mg/m2=82.5 mg civi @ 23 ml/hr vincristine 0.4 mg/m2=.66 mg civi w/ doxorubicin 10 mg/m2=16.5 mg civi @23 ml/hr

## 2017-06-21 ENCOUNTER — TRANSCRIPTION ENCOUNTER (OUTPATIENT)
Age: 77
End: 2017-06-21

## 2017-06-21 VITALS
HEART RATE: 70 BPM | OXYGEN SATURATION: 97 % | DIASTOLIC BLOOD PRESSURE: 79 MMHG | TEMPERATURE: 99 F | RESPIRATION RATE: 20 BRPM | SYSTOLIC BLOOD PRESSURE: 149 MMHG

## 2017-06-21 DIAGNOSIS — R20.0 ANESTHESIA OF SKIN: ICD-10-CM

## 2017-06-21 LAB
ALBUMIN SERPL ELPH-MCNC: 3.3 G/DL — SIGNIFICANT CHANGE UP (ref 3.3–5)
ALP SERPL-CCNC: 49 U/L — SIGNIFICANT CHANGE UP (ref 40–120)
ALT FLD-CCNC: 22 U/L RC — SIGNIFICANT CHANGE UP (ref 10–45)
ANION GAP SERPL CALC-SCNC: 12 MMOL/L — SIGNIFICANT CHANGE UP (ref 5–17)
AST SERPL-CCNC: 18 U/L — SIGNIFICANT CHANGE UP (ref 10–40)
BASOPHILS # BLD AUTO: 0 K/UL — SIGNIFICANT CHANGE UP (ref 0–0.2)
BASOPHILS NFR BLD AUTO: 0 % — SIGNIFICANT CHANGE UP (ref 0–2)
BILIRUB SERPL-MCNC: 0.5 MG/DL — SIGNIFICANT CHANGE UP (ref 0.2–1.2)
BUN SERPL-MCNC: 19 MG/DL — SIGNIFICANT CHANGE UP (ref 7–23)
CALCIUM SERPL-MCNC: 9 MG/DL — SIGNIFICANT CHANGE UP (ref 8.4–10.5)
CHLORIDE SERPL-SCNC: 102 MMOL/L — SIGNIFICANT CHANGE UP (ref 96–108)
CO2 SERPL-SCNC: 25 MMOL/L — SIGNIFICANT CHANGE UP (ref 22–31)
CREAT SERPL-MCNC: 0.75 MG/DL — SIGNIFICANT CHANGE UP (ref 0.5–1.3)
EOSINOPHIL # BLD AUTO: 0 K/UL — SIGNIFICANT CHANGE UP (ref 0–0.5)
EOSINOPHIL NFR BLD AUTO: 0.3 % — SIGNIFICANT CHANGE UP (ref 0–6)
GLUCOSE SERPL-MCNC: 125 MG/DL — HIGH (ref 70–99)
HCT VFR BLD CALC: 28.6 % — LOW (ref 34.5–45)
HGB BLD-MCNC: 9.8 G/DL — LOW (ref 11.5–15.5)
LDH SERPL L TO P-CCNC: 217 U/L — SIGNIFICANT CHANGE UP (ref 50–242)
LYMPHOCYTES # BLD AUTO: 0.6 K/UL — LOW (ref 1–3.3)
LYMPHOCYTES # BLD AUTO: 9.8 % — LOW (ref 13–44)
MCHC RBC-ENTMCNC: 32.8 PG — SIGNIFICANT CHANGE UP (ref 27–34)
MCHC RBC-ENTMCNC: 34.4 GM/DL — SIGNIFICANT CHANGE UP (ref 32–36)
MCV RBC AUTO: 95.1 FL — SIGNIFICANT CHANGE UP (ref 80–100)
MONOCYTES # BLD AUTO: 0.1 K/UL — SIGNIFICANT CHANGE UP (ref 0–0.9)
MONOCYTES NFR BLD AUTO: 1.2 % — LOW (ref 2–14)
NEUTROPHILS # BLD AUTO: 5 K/UL — SIGNIFICANT CHANGE UP (ref 1.8–7.4)
NEUTROPHILS NFR BLD AUTO: 88.7 % — HIGH (ref 43–77)
PLATELET # BLD AUTO: 237 K/UL — SIGNIFICANT CHANGE UP (ref 150–400)
POTASSIUM SERPL-MCNC: 4.1 MMOL/L — SIGNIFICANT CHANGE UP (ref 3.5–5.3)
POTASSIUM SERPL-SCNC: 4.1 MMOL/L — SIGNIFICANT CHANGE UP (ref 3.5–5.3)
PROT SERPL-MCNC: 5.7 G/DL — LOW (ref 6–8.3)
RBC # BLD: 3.01 M/UL — LOW (ref 3.8–5.2)
RBC # FLD: 17.7 % — HIGH (ref 10.3–14.5)
SODIUM SERPL-SCNC: 139 MMOL/L — SIGNIFICANT CHANGE UP (ref 135–145)
URATE SERPL-MCNC: 2.4 MG/DL — LOW (ref 2.5–7)
WBC # BLD: 5.6 K/UL — SIGNIFICANT CHANGE UP (ref 3.8–10.5)
WBC # FLD AUTO: 5.6 K/UL — SIGNIFICANT CHANGE UP (ref 3.8–10.5)

## 2017-06-21 PROCEDURE — 80048 BASIC METABOLIC PNL TOTAL CA: CPT

## 2017-06-21 PROCEDURE — 84550 ASSAY OF BLOOD/URIC ACID: CPT

## 2017-06-21 PROCEDURE — 85027 COMPLETE CBC AUTOMATED: CPT

## 2017-06-21 PROCEDURE — 80053 COMPREHEN METABOLIC PANEL: CPT

## 2017-06-21 PROCEDURE — 83615 LACTATE (LD) (LDH) ENZYME: CPT

## 2017-06-21 RX ORDER — ACYCLOVIR SODIUM 500 MG
1 VIAL (EA) INTRAVENOUS
Qty: 0 | Refills: 0 | COMMUNITY
Start: 2017-06-21

## 2017-06-21 RX ORDER — ALLOPURINOL 300 MG
1 TABLET ORAL
Qty: 0 | Refills: 0 | COMMUNITY
Start: 2017-06-21

## 2017-06-21 RX ORDER — PROCHLORPERAZINE MALEATE 5 MG
1 TABLET ORAL
Qty: 0 | Refills: 0 | COMMUNITY

## 2017-06-21 RX ORDER — CYCLOPHOSPHAMIDE 100 MG
825 VIAL (EA) INTRAVENOUS ONCE
Qty: 0 | Refills: 0 | Status: DISCONTINUED | OUTPATIENT
Start: 2017-06-21 | End: 2017-06-21

## 2017-06-21 RX ADMIN — Medication 50 MILLIGRAM(S): at 06:21

## 2017-06-21 RX ADMIN — ONDANSETRON 116 MILLIGRAM(S): 8 TABLET, FILM COATED ORAL at 12:16

## 2017-06-21 RX ADMIN — SODIUM CHLORIDE 50 MILLILITER(S): 9 INJECTION, SOLUTION INTRAVENOUS at 12:18

## 2017-06-21 RX ADMIN — Medication 1 TABLET(S): at 09:15

## 2017-06-21 RX ADMIN — FAMOTIDINE 100 MILLIGRAM(S): 10 INJECTION INTRAVENOUS at 12:30

## 2017-06-21 RX ADMIN — Medication 300 MILLIGRAM(S): at 06:21

## 2017-06-21 NOTE — ADVANCED PRACTICE NURSE CONSULT - ASSESSMENT
Cycle # 2 day 5/5 Lab results as per Md gurpreet aware of same. Vital signs stable prior to the start of chemotherapy, see sunrise.  Pt educated on the importance of saving urine, verbalize understanding of same.Pt education done re chemo regimen, drug effects and potential side effects, written material provided, pt states understanding. .Pt with rt chest wall mediport, site free from signs and symptoms of infection, positive blood return obtained. Pre-medicated with pepcid 20mg ivss, zofran 16mg ivss pt completed with the day 4 of continuous infusion withy no adverse effects pt started on cytoxan 500mg/t8=684cl iv over 30 min intiated at 1330 Cycle # 2 day 5/5 Lab results as per Md gurpreet aware of same. Vital signs stable prior to the start of chemotherapy, see sunrise.  Pt educated on the importance of saving urine, verbalize understanding of same.Pt education done re chemo regimen, drug effects and potential side effects, written material provided, pt states understanding. .Pt with rt chest wall mediport, site free from signs and symptoms of infection, positive blood return obtained. Pre-medicated with pepcid 20mg ivss, zofran 16mg ivss pt completed with the day 4 of continuous infusion withy no adverse effects pt started on cytoxan 500mg/k1=222ts iv over 30 min intiated at 1330 completed the infusion with no adverse effects post v/s stable pt seen by dr chandra this morning cleared for discharge home after chemotheraphy completes report given to the area nurse

## 2017-06-21 NOTE — DISCHARGE NOTE ADULT - MEDICATION SUMMARY - MEDICATIONS TO TAKE
I will START or STAY ON the medications listed below when I get home from the hospital:    Zofran 4 mg oral tablet  -- 1 tab(s) by mouth every 8 hours, As Needed  -- Indication: For Nausea/vomiting    allopurinol 300 mg oral tablet  -- 1 tab(s) by mouth once a day  -- Indication: For ppx    acyclovir 400 mg oral tablet  -- 1 tab(s) by mouth   -- Indication: For ppx    sulfamethoxazole-trimethoprim 800 mg-160 mg oral tablet  -- 1 tab(s) by mouth   -- Indication: For ppx

## 2017-06-21 NOTE — DISCHARGE NOTE ADULT - CARE PLAN
Principal Discharge DX:	Encounter for chemotherapy management  Goal:	s/p chemotherapy  Instructions for follow-up, activity and diet:	Follow up in the office for Darren 6/22/17 as scheduled  Secondary Diagnosis:	Diffuse large B-cell lymphoma, unspecified body region  Goal:	F/up with  - to decide if repeat PET scan needed

## 2017-06-21 NOTE — PROGRESS NOTE ADULT - SUBJECTIVE AND OBJECTIVE BOX
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 No nausea, vomiting, or hematemesis; No diarrhea or constipation. No melena or hematochezia.  GENITOURINARY: No dysuria, frequency or hematuria  NEUROLOGICAL: No numbness or weakness  SKIN: No itching, burning, rashes, or lesions       PHYSICAL EXAM  General: adult in NAD  HEENT: clear oropharynx, anicteric sclera, pink conjunctiva  Neck: supple  CV: normal S1/S2 with no murmur rubs or gallops  Lungs: positive air movement b/l ant lungs,clear to auscultation, no wheezes, no rales  Abdomen: soft non-tender non-distended, no hepatosplenomegaly  Ext: no clubbing cyanosis or edema  Skin: no rashes and no petechiae  Neuro: alert and oriented X 4, no focal deficits    MEDICATIONS  (STANDING):  famotidine  IVPB 20milliGRAM(s) IV Intermittent daily  allopurinol 300milliGRAM(s) Oral daily  dextrose 5% + sodium chloride 0.45%. 1000milliLiter(s) IV Continuous <Continuous>  trimethoprim  160 mG/sulfamethoxazole 800 mG 1Tablet(s) Oral <User Schedule>  predniSONE   Tablet 50milliGRAM(s) Oral two times a day  DOXOrubicin IVPB w/vinCRIStine 16.5milliGRAM(s) IV Intermittent daily  ondansetron  IVPB 16milliGRAM(s) IV Intermittent daily  etoposide IVPB 82.5milliGRAM(s) IV Intermittent daily  acyclovir   Tablet 400milliGRAM(s) Oral <User Schedule>    MEDICATIONS  (PRN):      Allergies    No Known Allergies    Intolerances        Vital Signs Last 24 Hrs  T(C): 36.4, Max: 36.8 (06-20 @ 23:45)  T(F): 97.6, Max: 98.2 (06-20 @ 23:45)  HR: 68 (67 - 70)  BP: 146/79 (146/79 - 153/80)  BP(mean): --  RR: 20 (18 - 20)  SpO2: 98% (97% - 98%)    LABS:                        9.8    5.6   )-----------( 237      ( 21 Jun 2017 07:04 )             28.6         Mean Cell Volume : 95.1 fl  Mean Cell Hemoglobin : 32.8 pg  Mean Cell Hemoglobin Concentration : 34.4 gm/dL  Auto Neutrophil # : 5.0 K/uL  Auto Lymphocyte # : 0.6 K/uL  Auto Monocyte # : 0.1 K/uL  Auto Eosinophil # : 0.0 K/uL  Auto Basophil # : 0.0 K/uL  Auto Neutrophil % : 88.7 %  Auto Lymphocyte % : 9.8 %  Auto Monocyte % : 1.2 %  Auto Eosinophil % : 0.3 %  Auto Basophil % : 0.0 %    Serial CBC's  06-21 @ 07:04  Hct-28.6 / Hgb-9.8 / Plat-237 / RBC-3.01 / WBC-5.6        06-21    139  |  102  |  19  ----------------------------<  125<H>  4.1   |  25  |  0.75    Ca    9.0      21 Jun 2017 07:04    TPro  5.7<L>  /  Alb  3.3  /  TBili  0.5  /  DBili  x   /  AST  18  /  ALT  22  /  AlkPhos  49  06-21    Lactate Dehydrogenase, Serum: 240 U/L (06-17 @ 11:34) Patient is a 76y old  Female who presents with with 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 Feels numbness on chin intermittently worse  No mouth sores  Minor numbness tips of fingers and toes.  ambulating well    REVIEW OF SYSTEMS:    CONSTITUTIONAL: No weakness, fevers or chills  EYES/ENT: No visual changes;  No vertigo or throat pain   NECK: No pain or stiffness  RESPIRATORY: No cough, wheezing, hemoptysis; No shortness of breath  CARDIOVASCULAR: No chest pain or palpitations  GASTROINTESTINAL: No abdominal or epigastric pain. No nausea, vomiting, or hematemesis; No diarrhea or constipation. No melena or hematochezia.  GENITOURINARY: No dysuria, frequency or hematuria  NEUROLOGICAL: No weakness. Numbness +  SKIN: No itching, burning, rashes, or lesions       PHYSICAL EXAM  General: adult in NAD  HEENT: clear oropharynx, anicteric sclera, pink conjunctiva  Neck: supple  CV: normal S1/S2 with no murmur rubs or gallops  Lungs: positive air movement b/l ant lungs,clear to auscultation, no wheezes, no rales  Abdomen: soft non-tender non-distended, no hepatosplenomegaly  Ext: no clubbing cyanosis, trace b/l p. edema  Skin: no rashes and no petechiae  Neuro: alert and oriented X 4, no focal deficits    MEDICATIONS  (STANDING):  famotidine  IVPB 20milliGRAM(s) IV Intermittent daily  allopurinol 300milliGRAM(s) Oral daily  dextrose 5% + sodium chloride 0.45%. 1000milliLiter(s) IV Continuous <Continuous>  trimethoprim  160 mG/sulfamethoxazole 800 mG 1Tablet(s) Oral <User Schedule>  predniSONE   Tablet 50milliGRAM(s) Oral two times a day  DOXOrubicin IVPB w/vinCRIStine 16.5milliGRAM(s) IV Intermittent daily  ondansetron  IVPB 16milliGRAM(s) IV Intermittent daily  etoposide IVPB 82.5milliGRAM(s) IV Intermittent daily  acyclovir   Tablet 400milliGRAM(s) Oral <User Schedule>    MEDICATIONS  (PRN):      Allergies    No Known Allergies    Intolerances        Vital Signs Last 24 Hrs  T(C): 36.4, Max: 36.8 (06-20 @ 23:45)  T(F): 97.6, Max: 98.2 (06-20 @ 23:45)  HR: 68 (67 - 70)  BP: 146/79 (146/79 - 153/80)  BP(mean): --  RR: 20 (18 - 20)  SpO2: 98% (97% - 98%)    LABS:                        9.8    5.6   )-----------( 237      ( 21 Jun 2017 07:04 )             28.6         Mean Cell Volume : 95.1 fl  Mean Cell Hemoglobin : 32.8 pg  Mean Cell Hemoglobin Concentration : 34.4 gm/dL  Auto Neutrophil # : 5.0 K/uL  Auto Lymphocyte # : 0.6 K/uL  Auto Monocyte # : 0.1 K/uL  Auto Eosinophil # : 0.0 K/uL  Auto Basophil # : 0.0 K/uL  Auto Neutrophil % : 88.7 %  Auto Lymphocyte % : 9.8 %  Auto Monocyte % : 1.2 %  Auto Eosinophil % : 0.3 %  Auto Basophil % : 0.0 %    Serial CBC's  06-21 @ 07:04  Hct-28.6 / Hgb-9.8 / Plat-237 / RBC-3.01 / WBC-5.6        06-21    139  |  102  |  19  ----------------------------<  125<H>  4.1   |  25  |  0.75    Ca    9.0      21 Jun 2017 07:04    TPro  5.7<L>  /  Alb  3.3  /  TBili  0.5  /  DBili  x   /  AST  18  /  ALT  22  /  AlkPhos  49  06-21    Lactate Dehydrogenase, Serum: 240 U/L (06-17 @ 11:34)

## 2017-06-21 NOTE — DISCHARGE NOTE ADULT - HOSPITAL COURSE
to be completed by attending 76 female admitted for  2nd cycle EPOCH Rituxan for high grade B cell lymphoma  Had infusion rxn to Rituxan last cycle but was able to  complete the infusion  Otherwise tolerated therapy well.  pt seems comfortable and denies any complaints at this time6/17 Pt states she is on acyclovier 400 mg BID Tues, Thurs, Sat during chemo treatments, pt on bactrim and allopurinol, clarify with MD garcia in the AM   6/18 d/w Dr. Wolff - can restart acyclovir -please verify with Dr. Garcia for frequency   Medically stable . dc home.

## 2017-06-21 NOTE — PROGRESS NOTE ADULT - PROBLEM SELECTOR PLAN 1
C2 R-EPOCH, Day #3  Tolerated Rituxan infusion with no reaction  Continue chemo as directed  Continue to monitor daily labs, CBC, CMP, LDH, uric acid  Continue abx prophylaxis -- Bactrim DS BID M,W,F and Acyclovir BID T,TH, Sat  Continue daily allopurinol  Continue supportive care  Plan for Neulasta 24 hours post last chemo as outpatient at Dr Ricks's office  Follow up with Dr Ricks as outpatient 7-10 days post completion of chemo
C2 R-EPOCH, Day #4    Continue chemo as directed  Continue to monitor daily labs, CBC, CMP, LDH, uric acid  Continue abx prophylaxis -- Bactrim DS BID M,W,F and Acyclovir BID T,TH, Sat  Continue daily allopurinol  Continue supportive care  Plan for Neulasta 24 hours post last chemo as outpatient at Dr Ricks's office  Follow up with Dr Ricks as outpatient 7-10 days post completion of chemo
Last day of C2 R-EPOCH  Tolerating treatment well  Discharge after chemo today  ct mouth care  Plan for neulasta in am in office  F/up with  - to decide if repeat PET scan needed  Mild edema due to steroids and positive fluid balance - ct to monitor
C2 R-EPOCH, Day #2  Tolerated Rituxan infusion with no reaction  Continue chemo as directed  Continue to monitor daily labs, CBC, CMP, LDH, uric acid  Continue abx prophylaxis -- Bactrim DS BID M,W,F and Acyclovir BID T,TH, Sat  Continue daily allopurinol  Continue supportive care  Plan for Neulasta 24 hours post last chemo as outpatient at Dr Ricks's office  Follow up with Dr Ricks as outpatient 7-10 days post completion of chemo

## 2017-06-21 NOTE — DISCHARGE NOTE ADULT - INSTRUCTIONS
Regular diet      Mild edema due to steroids and positive fluid balance - ct to monitor.     Problem/Plan - 2:  ·  Problem: Numbness.  Plan: Numbness on chin predates chemo - f/up with  as outpatient.

## 2017-06-21 NOTE — DISCHARGE NOTE ADULT - PATIENT PORTAL LINK FT
“You can access the [facility] Patient Portal, offered by [facility], by registering with the following website: [email or web address]”

## 2017-06-21 NOTE — PROGRESS NOTE ADULT - PROBLEM SELECTOR PLAN 2
- Monitor I/O's while on chemotherapy  - I&O's Summary    I & Os for current day (as of 20 Jun 2017 08:12)  =============================================  IN: 4836 ml / OUT: 2800 ml / NET: 2036 ml  - I/O stable -- will hold off on diuretic at this time
Numbness on chin predates chemo - f/up with  as outpatient
- Monitor I/O's while on chemotherapy  - May need IV lasix if (+) fluid balance

## 2017-06-21 NOTE — PROGRESS NOTE ADULT - SUBJECTIVE AND OBJECTIVE BOX
Patient is a 76y old  Female who presents with a chief complaint of Chemotherapy initiation (21 Jun 2017 11:56)      SUBJECTIVE / OVERNIGHT EVENTS: comfortable  Review of Systems  chest pain no  palpitations no  sob no  nausea no  headache no    MEDICATIONS  (STANDING):  allopurinol 300milliGRAM(s) Oral daily  dextrose 5% + sodium chloride 0.45%. 1000milliLiter(s) IV Continuous <Continuous>  trimethoprim  160 mG/sulfamethoxazole 800 mG 1Tablet(s) Oral <User Schedule>  predniSONE   Tablet 50milliGRAM(s) Oral two times a day  DOXOrubicin IVPB w/vinCRIStine 16.5milliGRAM(s) IV Intermittent daily  ondansetron  IVPB 16milliGRAM(s) IV Intermittent daily  etoposide IVPB 82.5milliGRAM(s) IV Intermittent daily  acyclovir   Tablet 400milliGRAM(s) Oral <User Schedule>  cyclophosphamide IVPB 825milliGRAM(s) IV Intermittent once    MEDICATIONS  (PRN):      Vital Signs Last 24 Hrs  T(C): 37, Max: 37 (06-21 @ 14:47)  HR: 70 (67 - 70)  BP: 149/79 (125/74 - 153/80)  RR: 20 (18 - 20)  SpO2: 97% (96% - 98%)  Wt(kg): --  CAPILLARY BLOOD GLUCOSE    I&O's Summary  I & Os for 24h ending 21 Jun 2017 07:00  =============================================  IN: 2956 ml / OUT: 3910 ml / NET: -954 ml    I & Os for current day (as of 21 Jun 2017 16:18)  =============================================  IN: 1025 ml / OUT: 1000 ml / NET: 25 ml      PHYSICAL EXAM:  GENERAL: NAD, well-developed  HEAD:  Atraumatic, Normocephalic  EYES: EOMI, PERRLA, conjunctiva and sclera clear  NECK: Supple, No JVD  CHEST/LUNG: Clear to auscultation bilaterally; No wheeze  HEART: Regular rate and rhythm; No murmurs, rubs, or gallops  ABDOMEN: Soft, Nontender, Nondistended; Bowel sounds present  EXTREMITIES:  2+ Peripheral Pulses, No clubbing, cyanosis, or edema  PSYCH: AAOx3  NEUROLOGY: non-focal  SKIN: No rashes or lesions    LABS:                        9.8    5.6   )-----------( 237      ( 21 Jun 2017 07:04 )             28.6     06-21    139  |  102  |  19  ----------------------------<  125<H>  4.1   |  25  |  0.75    Ca    9.0      21 Jun 2017 07:04    TPro  5.7<L>  /  Alb  3.3  /  TBili  0.5  /  DBili  x   /  AST  18  /  ALT  22  /  AlkPhos  49  06-21              RADIOLOGY & ADDITIONAL TESTS:    Imaging Personally Reviewed:    Consultant(s) Notes Reviewed:      Care Discussed with Consultants/Other Providers:

## 2017-06-21 NOTE — PROGRESS NOTE ADULT - PROBLEM SELECTOR PROBLEM 1
Diffuse large B-cell lymphoma, unspecified body region

## 2017-06-21 NOTE — DISCHARGE NOTE ADULT - PLAN OF CARE
s/p chemotherapy Follow up in the office for Darren 6/22/17 as scheduled F/up with  - to decide if repeat PET scan needed

## 2017-06-21 NOTE — DISCHARGE NOTE ADULT - CARE PROVIDER_API CALL
Campos Ricks), Internal Medicine; Medical Oncology  [address] [place] [address]  Phone: [phone]  Fax: [phone]

## 2017-06-21 NOTE — PROGRESS NOTE ADULT - PROBLEM SELECTOR PLAN 3
- cont present regimen
ct PRUNE JUICE  cT PO HYDRATION AND AMBULATION
- possibly from etoposide  - trial of prune juice  - if no improvement may require laxative

## 2017-06-21 NOTE — PROGRESS NOTE ADULT - ASSESSMENT
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
76 f with B cell lymphoma for chemoRx  Oncology evaluation Dr. Garcia noted  continue day 2 EPOCH chemo for lymphoma  prophylaxis with Bactrim/ Acyclovir  follow cbc/ lytes
76 f with B cell lymphoma for chemoRx  Oncology evaluation Dr. Garcia noted  continue day 3 EPOCH chemo for lymphoma  prophylaxis with Bactrim/ Acyclovir  follow cbc/ lytes  bowel regimen
76 f with B cell lymphoma for chemoRx  Oncology evaluation Dr. Garcia noted  continue day 4 EPOCH chemo for lymphoma  prophylaxis with Bactrim/ Acyclovir  follow cbc/ lytes  bowel regimen
76 f with B cell lymphoma for chemoRx  Oncology evaluation Dr. Garcia noted  s/p day 5 EPOCH chemo for lymphoma  prophylaxis with Bactrim/ Acyclovir  follow cbc/ lytes  DC home   F/U oncology in 1 day for neulasta  bowel regimen
76 year old female with Diffuse Large B cell Lymphoma, Double Hit, admitted for C#2 R-EPOCH
76 year old female with Diffuse Large B cell Lymphoma, Double Hit, admitted for C#2 R-EPOCH - Day #4 today

## 2017-07-14 ENCOUNTER — INPATIENT (INPATIENT)
Facility: HOSPITAL | Age: 77
LOS: 3 days | Discharge: HOME | DRG: 847 | End: 2017-07-18
Attending: INTERNAL MEDICINE | Admitting: INTERNAL MEDICINE
Payer: MEDICARE

## 2017-07-14 VITALS — HEIGHT: 66 IN | WEIGHT: 127.43 LBS

## 2017-07-14 DIAGNOSIS — H11.039: ICD-10-CM

## 2017-07-14 DIAGNOSIS — D27.9 BENIGN NEOPLASM OF UNSPECIFIED OVARY: Chronic | ICD-10-CM

## 2017-07-14 DIAGNOSIS — Z98.890 OTHER SPECIFIED POSTPROCEDURAL STATES: Chronic | ICD-10-CM

## 2017-07-14 DIAGNOSIS — C83.30 DIFFUSE LARGE B-CELL LYMPHOMA, UNSPECIFIED SITE: ICD-10-CM

## 2017-07-14 DIAGNOSIS — Z86.018 PERSONAL HISTORY OF OTHER BENIGN NEOPLASM: Chronic | ICD-10-CM

## 2017-07-14 LAB
ALBUMIN SERPL ELPH-MCNC: 3.7 G/DL — SIGNIFICANT CHANGE UP (ref 3.3–5)
ALP SERPL-CCNC: 57 U/L — SIGNIFICANT CHANGE UP (ref 40–120)
ALT FLD-CCNC: 18 U/L RC — SIGNIFICANT CHANGE UP (ref 10–45)
ANION GAP SERPL CALC-SCNC: 12 MMOL/L — SIGNIFICANT CHANGE UP (ref 5–17)
AST SERPL-CCNC: 19 U/L — SIGNIFICANT CHANGE UP (ref 10–40)
BILIRUB SERPL-MCNC: 0.2 MG/DL — SIGNIFICANT CHANGE UP (ref 0.2–1.2)
BUN SERPL-MCNC: 20 MG/DL — SIGNIFICANT CHANGE UP (ref 7–23)
CALCIUM SERPL-MCNC: 9.3 MG/DL — SIGNIFICANT CHANGE UP (ref 8.4–10.5)
CHLORIDE SERPL-SCNC: 103 MMOL/L — SIGNIFICANT CHANGE UP (ref 96–108)
CO2 SERPL-SCNC: 26 MMOL/L — SIGNIFICANT CHANGE UP (ref 22–31)
CREAT SERPL-MCNC: 0.91 MG/DL — SIGNIFICANT CHANGE UP (ref 0.5–1.3)
GLUCOSE SERPL-MCNC: 99 MG/DL — SIGNIFICANT CHANGE UP (ref 70–99)
HCT VFR BLD CALC: 28.1 % — LOW (ref 34.5–45)
HGB BLD-MCNC: 9.8 G/DL — LOW (ref 11.5–15.5)
LDH SERPL L TO P-CCNC: 207 U/L — SIGNIFICANT CHANGE UP (ref 50–242)
MCHC RBC-ENTMCNC: 34.4 PG — HIGH (ref 27–34)
MCHC RBC-ENTMCNC: 34.7 GM/DL — SIGNIFICANT CHANGE UP (ref 32–36)
MCV RBC AUTO: 99 FL — SIGNIFICANT CHANGE UP (ref 80–100)
PLATELET # BLD AUTO: 318 K/UL — SIGNIFICANT CHANGE UP (ref 150–400)
POTASSIUM SERPL-MCNC: 4.5 MMOL/L — SIGNIFICANT CHANGE UP (ref 3.5–5.3)
POTASSIUM SERPL-SCNC: 4.5 MMOL/L — SIGNIFICANT CHANGE UP (ref 3.5–5.3)
PROT SERPL-MCNC: 5.9 G/DL — LOW (ref 6–8.3)
RBC # BLD: 2.84 M/UL — LOW (ref 3.8–5.2)
RBC # FLD: 19.4 % — HIGH (ref 10.3–14.5)
SODIUM SERPL-SCNC: 141 MMOL/L — SIGNIFICANT CHANGE UP (ref 135–145)
URATE SERPL-MCNC: 2 MG/DL — LOW (ref 2.5–7)
WBC # BLD: 8.6 K/UL — SIGNIFICANT CHANGE UP (ref 3.8–10.5)
WBC # FLD AUTO: 8.6 K/UL — SIGNIFICANT CHANGE UP (ref 3.8–10.5)

## 2017-07-14 RX ORDER — RITUXIMAB 10 MG/ML
618.75 INJECTION, SOLUTION INTRAVENOUS ONCE
Qty: 0 | Refills: 0 | Status: DISCONTINUED | OUTPATIENT
Start: 2017-07-14 | End: 2017-07-18

## 2017-07-14 RX ORDER — SODIUM CHLORIDE 9 MG/ML
1000 INJECTION, SOLUTION INTRAVENOUS
Qty: 0 | Refills: 0 | Status: DISCONTINUED | OUTPATIENT
Start: 2017-07-14 | End: 2017-07-18

## 2017-07-14 RX ORDER — FOSAPREPITANT DIMEGLUMINE 150 MG/5ML
150 INJECTION, POWDER, LYOPHILIZED, FOR SOLUTION INTRAVENOUS ONCE
Qty: 0 | Refills: 0 | Status: COMPLETED | OUTPATIENT
Start: 2017-07-14 | End: 2017-07-14

## 2017-07-14 RX ORDER — ONDANSETRON 8 MG/1
16 TABLET, FILM COATED ORAL ONCE
Qty: 0 | Refills: 0 | Status: DISCONTINUED | OUTPATIENT
Start: 2017-07-14 | End: 2017-07-14

## 2017-07-14 RX ORDER — DOXORUBICIN HYDROCHLORIDE 2 MG/ML
16.5 INJECTION, SOLUTION INTRAVENOUS DAILY
Qty: 0 | Refills: 0 | Status: DISCONTINUED | OUTPATIENT
Start: 2017-07-14 | End: 2017-07-18

## 2017-07-14 RX ORDER — ACETAMINOPHEN 500 MG
650 TABLET ORAL EVERY 6 HOURS
Qty: 0 | Refills: 0 | Status: DISCONTINUED | OUTPATIENT
Start: 2017-07-14 | End: 2017-07-18

## 2017-07-14 RX ORDER — ACETAMINOPHEN 500 MG
650 TABLET ORAL ONCE
Qty: 0 | Refills: 0 | Status: COMPLETED | OUTPATIENT
Start: 2017-07-14 | End: 2017-07-14

## 2017-07-14 RX ORDER — ALLOPURINOL 300 MG
300 TABLET ORAL DAILY
Qty: 0 | Refills: 0 | Status: DISCONTINUED | OUTPATIENT
Start: 2017-07-14 | End: 2017-07-18

## 2017-07-14 RX ORDER — ACYCLOVIR SODIUM 500 MG
400 VIAL (EA) INTRAVENOUS
Qty: 0 | Refills: 0 | Status: DISCONTINUED | OUTPATIENT
Start: 2017-07-14 | End: 2017-07-16

## 2017-07-14 RX ORDER — ONDANSETRON 8 MG/1
4 TABLET, FILM COATED ORAL EVERY 8 HOURS
Qty: 0 | Refills: 0 | Status: DISCONTINUED | OUTPATIENT
Start: 2017-07-14 | End: 2017-07-18

## 2017-07-14 RX ORDER — FAMOTIDINE 10 MG/ML
20 INJECTION INTRAVENOUS DAILY
Qty: 0 | Refills: 0 | Status: COMPLETED | OUTPATIENT
Start: 2017-07-14 | End: 2017-07-18

## 2017-07-14 RX ORDER — ETOPOSIDE 20 MG/ML
82.5 VIAL (ML) INTRAVENOUS DAILY
Qty: 0 | Refills: 0 | Status: DISCONTINUED | OUTPATIENT
Start: 2017-07-14 | End: 2017-07-18

## 2017-07-14 RX ORDER — DIPHENHYDRAMINE HCL 50 MG
25 CAPSULE ORAL ONCE
Qty: 0 | Refills: 0 | Status: COMPLETED | OUTPATIENT
Start: 2017-07-14 | End: 2017-07-14

## 2017-07-14 RX ORDER — ONDANSETRON 8 MG/1
16 TABLET, FILM COATED ORAL DAILY
Qty: 0 | Refills: 0 | Status: COMPLETED | OUTPATIENT
Start: 2017-07-14 | End: 2017-07-18

## 2017-07-14 RX ORDER — HEPARIN SODIUM 5000 [USP'U]/ML
5000 INJECTION INTRAVENOUS; SUBCUTANEOUS EVERY 12 HOURS
Qty: 0 | Refills: 0 | Status: DISCONTINUED | OUTPATIENT
Start: 2017-07-14 | End: 2017-07-18

## 2017-07-14 RX ADMIN — FAMOTIDINE 100 MILLIGRAM(S): 10 INJECTION INTRAVENOUS at 17:10

## 2017-07-14 RX ADMIN — Medication 1 TABLET(S): at 17:01

## 2017-07-14 RX ADMIN — Medication 50 MILLIGRAM(S): at 18:17

## 2017-07-14 RX ADMIN — Medication 25 MILLIGRAM(S): at 13:30

## 2017-07-14 RX ADMIN — FOSAPREPITANT DIMEGLUMINE 450 MILLIGRAM(S): 150 INJECTION, POWDER, LYOPHILIZED, FOR SOLUTION INTRAVENOUS at 17:30

## 2017-07-14 RX ADMIN — HEPARIN SODIUM 5000 UNIT(S): 5000 INJECTION INTRAVENOUS; SUBCUTANEOUS at 16:58

## 2017-07-14 RX ADMIN — ONDANSETRON 116 MILLIGRAM(S): 8 TABLET, FILM COATED ORAL at 18:18

## 2017-07-14 RX ADMIN — Medication 650 MILLIGRAM(S): at 13:30

## 2017-07-14 NOTE — PROGRESS NOTE ADULT - ASSESSMENT
Diffuse large B cell lymphoma. admitted for third cycle R-EPOCH. tolerated first 2 cycles well except for a moderate infusion reaction to Rituxan with the first cycle. No other significant medical problems.

## 2017-07-14 NOTE — H&P ADULT - NSHPSOCIALHISTORY_GEN_ALL_CORE
Social History:    Marital Status:  (  x )    (   ) Single    (   )    (  )   Occupation:   Lives with: (  ) alone  (  ) children   (x  ) spouse   (  ) parents  (  ) other    Substance Use (street drugs): ( x ) never used  (  ) other:  Tobacco Usage:  ( x  ) never smoked   (   ) former smoker   (   ) current smoker  (     ) pack years  (        ) last cigarette date  Alcohol Usage: denies    (     ) Advanced Directives: (     ) None    (      ) DNR    (     ) DNI    (     ) Health Care Proxy:

## 2017-07-14 NOTE — PROGRESS NOTE ADULT - SUBJECTIVE AND OBJECTIVE BOX
Admitted electively for R-EPOCH chemotherapy for diffuse large cell lymphoma. For third cycle. reportedly having a good response. Had an infusion reaction to first dose Rituxan, none to second dose.  Pt is seen and examined  pt is awake and lying in bed  pt seems comfortable and denies any complaints at this time        MEDICATIONS  (STANDING):  dextrose 5% + sodium chloride 0.45%. 1000 milliLiter(s) (50 mL/Hr) IV Continuous <Continuous>    MEDICATIONS  (PRN):      Allergies    No Known Allergies    Intolerances        Vital Signs Last 24 Hrs  T(C): 36.8 (14 Jul 2017 10:17), Max: 36.8 (14 Jul 2017 10:17)  T(F): 98.2 (14 Jul 2017 10:17), Max: 98.2 (14 Jul 2017 10:17)  HR: 90 (14 Jul 2017 10:17) (90 - 90)  BP: 117/75 (14 Jul 2017 10:17) (117/75 - 117/75)  BP(mean): --  RR: 18 (14 Jul 2017 10:17) (18 - 18)  SpO2: 97% (14 Jul 2017 10:17) (97% - 97%)    PHYSICAL EXAM  General: adult in NAD  HEENT: clear oropharynx, anicteric sclera, pink conjunctiva  Neck: supple  CV: normal S1/S2 with no murmur rubs or gallops  Lungs: positive air movement b/l ant lungs,clear to auscultation, no wheezes, no rales  Abdomen: soft non-tender non-distended, no hepatosplenomegaly  Ext: no clubbing cyanosis or edema  Skin: no rashes and no petechiae  Neuro: alert and oriented X 4, no focal deficits  LABS:  out patient labs reviewed

## 2017-07-14 NOTE — H&P ADULT - NSHPLABSRESULTS_GEN_ALL_CORE
9.8    8.6   )-----------( 318      ( 14 Jul 2017 11:39 )             28.1       07-14    141  |  103  |  20  ----------------------------<  99  4.5   |  26  |  0.91    Ca    9.3      14 Jul 2017 11:39    TPro  5.9<L>  /  Alb  3.7  /  TBili  0.2  /  DBili  x   /  AST  19  /  ALT  18  /  AlkPhos  57  07-14                      Lactate Trend            CAPILLARY BLOOD GLUCOSE

## 2017-07-14 NOTE — PATIENT PROFILE ADULT. - FUNCTIONAL SCREEN CURRENT LEVEL: TRANSFERRING, MLM
Date: 10/17/2022    Patient Name: Georgiana Mcleod          To Whom it may concern: This letter has been written at the patient's request. The above patient was seen at the [facility] for treatment of a medical condition. This patient can return to school. Please allow extra time between classes, avoid physical education and sports activity until further notice, avoid crowded hallways, allow elevator use, and assistance with books/lunch and carrying items. Anticipate light hockey skating in four to six weeks. Please accommodate accordingly. Sincerely,          KATHY Barros, PA-C Orthopedic Surgery / Sports Medicine Specialist  [facility]  [address], [address] CARTER, [address]   [email or web address]  [email or web address]  t: [phone]  o: [phone]  f: [phone]          This note was dictated using Dragon software. While it was briefly proofread prior to completion, some grammatical, spelling, and word choice errors due to dictation may still occur. (2) assistive person

## 2017-07-14 NOTE — H&P ADULT - ASSESSMENT
76 f with b cell lymphoma for chemoRx  IV hydration  prophylaxis  follow cbc, electrolytes  oncology follow  Further action as per clinical course   Sea Wolff MD pager [phone]

## 2017-07-14 NOTE — ADVANCED PRACTICE NURSE CONSULT - ASSESSMENT
Cycle 2, day day #1/5,Height and weight verified. Lab results as per Md gurpreet aware of same. Vital signs stable prior to chemotherapy, and  within acceptable parameters, see sunrise. Pt educated on the importance of saving urine, verbalizes good understanding. Pt  and spouse education  reinforced  on chemo regimen, drug effects and potential side effects, states understanding. Reports that she has been tolerating chemotherapy well without side effects. Pt wit Right Chest Wall Mediport site free from signs and symptoms of infection, good blood return obtained. Pre- medicated with Tylenol 650mg po and Benadryl 25mg IVP. Rituxan 375mg/o6=839.75mg , same given as per first time protocol, patient tolerated same well without immediate side effects noted.  This was followed by pre-medications of  Emend 150mg IVSS, ,Zofran 16 mg IVSS, Pepcid 20 mg IVSS and prednisone as directed. Etoposide 50 mg/m2=82.5 mg continuos infusion @ 23 ml/hr Vincristine 0.4 mg/m2 =0.66 mg civi with Doxorubicin 10 mg/m2=16.5 mg continuos @ 23 ml/hr. Patient tolerating same well without side effects.

## 2017-07-14 NOTE — PROGRESS NOTE ADULT - PROBLEM SELECTOR PLAN 1
check baseline CBC, CMP, LDH, Uric acid  IV hydration during chemotherapy  Premeds antiemetics as ordered  OK to proceed with chemotherapy.

## 2017-07-14 NOTE — H&P ADULT - NSHPPHYSICALEXAM_GEN_ALL_CORE
PHYSICAL EXAMINATION:  Vital Signs Last 24 Hrs  T(C): 36.8 (14 Jul 2017 10:17), Max: 36.8 (14 Jul 2017 10:17)  T(F): 98.2 (14 Jul 2017 10:17), Max: 98.2 (14 Jul 2017 10:17)  HR: 90 (14 Jul 2017 10:17) (90 - 90)  BP: 117/75 (14 Jul 2017 10:17) (117/75 - 117/75)  BP(mean): --  RR: 18 (14 Jul 2017 10:17) (18 - 18)  SpO2: 97% (14 Jul 2017 10:17) (97% - 97%)  CAPILLARY BLOOD GLUCOSE          GENERAL: NAD, well-groomed, well-developed  HEAD:  atraumatic, normocephalic  EYES: sclera anicteric  ENMT: mucous membranes moist  NECK: supple, No JVD  CHEST/LUNG: clear to auscultation bilaterally; no rales, rhonchi, or wheezing b/l  HEART: normal S1, S2  ABDOMEN: BS+, soft, ND, NT   EXTREMITIES:  pulses palpable; no clubbing, cyanosis, or edema b/l LEs  NEURO: awake, alert, interactive; moves all extremities  SKIN: no rashes or lesions

## 2017-07-15 LAB
ALBUMIN SERPL ELPH-MCNC: 3.8 G/DL — SIGNIFICANT CHANGE UP (ref 3.3–5)
ALP SERPL-CCNC: 55 U/L — SIGNIFICANT CHANGE UP (ref 40–120)
ALT FLD-CCNC: 17 U/L RC — SIGNIFICANT CHANGE UP (ref 10–45)
ANION GAP SERPL CALC-SCNC: 12 MMOL/L — SIGNIFICANT CHANGE UP (ref 5–17)
AST SERPL-CCNC: 19 U/L — SIGNIFICANT CHANGE UP (ref 10–40)
BILIRUB SERPL-MCNC: 0.2 MG/DL — SIGNIFICANT CHANGE UP (ref 0.2–1.2)
BUN SERPL-MCNC: 19 MG/DL — SIGNIFICANT CHANGE UP (ref 7–23)
CALCIUM SERPL-MCNC: 9.3 MG/DL — SIGNIFICANT CHANGE UP (ref 8.4–10.5)
CHLORIDE SERPL-SCNC: 106 MMOL/L — SIGNIFICANT CHANGE UP (ref 96–108)
CO2 SERPL-SCNC: 23 MMOL/L — SIGNIFICANT CHANGE UP (ref 22–31)
CREAT SERPL-MCNC: 0.85 MG/DL — SIGNIFICANT CHANGE UP (ref 0.5–1.3)
GLUCOSE SERPL-MCNC: 146 MG/DL — HIGH (ref 70–99)
HCT VFR BLD CALC: 29.5 % — LOW (ref 34.5–45)
HGB BLD-MCNC: 9.8 G/DL — LOW (ref 11.5–15.5)
LDH SERPL L TO P-CCNC: 199 U/L — SIGNIFICANT CHANGE UP (ref 50–242)
MCHC RBC-ENTMCNC: 33.1 GM/DL — SIGNIFICANT CHANGE UP (ref 32–36)
MCHC RBC-ENTMCNC: 33.1 PG — SIGNIFICANT CHANGE UP (ref 27–34)
MCV RBC AUTO: 100 FL — SIGNIFICANT CHANGE UP (ref 80–100)
PLATELET # BLD AUTO: 298 K/UL — SIGNIFICANT CHANGE UP (ref 150–400)
POTASSIUM SERPL-MCNC: 4.5 MMOL/L — SIGNIFICANT CHANGE UP (ref 3.5–5.3)
POTASSIUM SERPL-SCNC: 4.5 MMOL/L — SIGNIFICANT CHANGE UP (ref 3.5–5.3)
PROT SERPL-MCNC: 5.9 G/DL — LOW (ref 6–8.3)
RBC # BLD: 2.95 M/UL — LOW (ref 3.8–5.2)
RBC # FLD: 19.4 % — HIGH (ref 10.3–14.5)
SODIUM SERPL-SCNC: 141 MMOL/L — SIGNIFICANT CHANGE UP (ref 135–145)
URATE SERPL-MCNC: 2 MG/DL — LOW (ref 2.5–7)
WBC # BLD: 5.2 K/UL — SIGNIFICANT CHANGE UP (ref 3.8–10.5)
WBC # FLD AUTO: 5.2 K/UL — SIGNIFICANT CHANGE UP (ref 3.8–10.5)

## 2017-07-15 RX ADMIN — Medication 400 MILLIGRAM(S): at 12:00

## 2017-07-15 RX ADMIN — FAMOTIDINE 100 MILLIGRAM(S): 10 INJECTION INTRAVENOUS at 12:46

## 2017-07-15 RX ADMIN — Medication 50 MILLIGRAM(S): at 05:59

## 2017-07-15 RX ADMIN — SODIUM CHLORIDE 50 MILLILITER(S): 9 INJECTION, SOLUTION INTRAVENOUS at 05:59

## 2017-07-15 RX ADMIN — Medication 50 MILLIGRAM(S): at 18:51

## 2017-07-15 RX ADMIN — HEPARIN SODIUM 5000 UNIT(S): 5000 INJECTION INTRAVENOUS; SUBCUTANEOUS at 05:59

## 2017-07-15 RX ADMIN — HEPARIN SODIUM 5000 UNIT(S): 5000 INJECTION INTRAVENOUS; SUBCUTANEOUS at 18:51

## 2017-07-15 RX ADMIN — ONDANSETRON 116 MILLIGRAM(S): 8 TABLET, FILM COATED ORAL at 12:46

## 2017-07-15 RX ADMIN — Medication 300 MILLIGRAM(S): at 12:00

## 2017-07-15 NOTE — PROGRESS NOTE ADULT - PROBLEM SELECTOR PLAN 1
Monitor CBC, CMP, LDH, Uric acid  IV hydration during chemotherapy  Premeds antiemetics as ordered  continue chemotherapy (adriamycin, vincristine, Etoposide today)  OK to proceed with chemotherapy.

## 2017-07-15 NOTE — PROGRESS NOTE ADULT - ASSESSMENT
76 f with b cell lymphoma for chemoRx  IV hydration during chemoRx  prophylaxis with Bactrim/Valtrex  follow cbc, electrolytes  oncology follow  discussed with patient/ CANDICE Wolff MD pager [phone]

## 2017-07-15 NOTE — ADVANCED PRACTICE NURSE CONSULT - ASSESSMENT
Cycle 2, day day 2/5,Height and weight verified. Lab results as per Md gurpreet aware of same. Vital signs stable prior to chemotherapy, and  within accepectable parameters, see sunrise. Pt educated on the importance of saving urine, verbalizes good understanding. Pt education  reinforced chemo regimen, drug effects and potential side effects, written materials provided, pt states understanding. Reports that /she has been tolerating chemotherapy well without side effects. Pt with  line RCW mediport, site free from signs and symptoms of infection, good blood return obtained. Pre- medicated withzofran 16 mg ivss pepcid 20 mg ivss etoposide 50 mg/m2=82.5 mg civi @ 23 ml/hr vincristine 0.4 mg/m2 =0.66 mg civi w/ doxorubicin 10 mg/m2=16.5 mg civi @ 23 ml/hr

## 2017-07-15 NOTE — PROGRESS NOTE ADULT - SUBJECTIVE AND OBJECTIVE BOX
Patient is a 76y old  Female who presents with a chief complaint of chemotherapy (14 Jul 2017 13:49)      SUBJECTIVE / OVERNIGHT EVENTS: Comfortable without new complaints.   Review of Systems  chest pain no  palpitations no  sob no  nausea no  headache no    MEDICATIONS  (STANDING):  dextrose 5% + sodium chloride 0.45%. 1000 milliLiter(s) (50 mL/Hr) IV Continuous <Continuous>  allopurinol 300 milliGRAM(s) Oral daily  heparin  Injectable 5000 Unit(s) SubCutaneous every 12 hours  acyclovir   Tablet 400 milliGRAM(s) Oral <User Schedule>  trimethoprim  160 mG/sulfamethoxazole 800 mG 1 Tablet(s) Oral <User Schedule>  riTUXimab IVPB 618.75 milliGRAM(s) IV Intermittent once  ondansetron  IVPB 16 milliGRAM(s) IV Intermittent daily  etoposide IVPB 82.5 milliGRAM(s) IV Intermittent daily  DOXOrubicin IVPB w/vinCRIStine 16.5 milliGRAM(s) IV Intermittent daily  famotidine  IVPB 20 milliGRAM(s) IV Intermittent daily  predniSONE   Tablet 50 milliGRAM(s) Oral two times a day    MEDICATIONS  (PRN):  acetaminophen   Tablet 650 milliGRAM(s) Oral every 6 hours PRN For Temp greater than 38.5 C (101.3 F)  acetaminophen   Tablet. 650 milliGRAM(s) Oral every 6 hours PRN Mild Pain (1 - 3)  ondansetron    Tablet 4 milliGRAM(s) Oral every 8 hours PRN Nausea and/or Vomiting      Vital Signs Last 24 Hrs  T(C): 36.7 (07-14-17 @ 22:00), Max: 36.8 (07-14-17 @ 17:00)  HR: 71 (07-14-17 @ 22:00) (71 - 88)  BP: 98/61 (07-14-17 @ 22:00) (98/61 - 110/70)  RR: 18 (07-14-17 @ 22:00) (18 - 18)  SpO2: 95% (07-14-17 @ 22:00) (95% - 98%)  CAPILLARY BLOOD GLUCOSE        I&O's Summary    14 Jul 2017 07:01  -  15 Jul 2017 07:00  --------------------------------------------------------  IN: 3325 mL / OUT: 2300 mL / NET: 1025 mL        PHYSICAL EXAM:  GENERAL: NAD, well-developed  HEAD:  Atraumatic, Normocephalic  EYES: EOMI, PERRLA, conjunctiva and sclera clear  NECK: Supple, No JVD  CHEST/LUNG: Clear to auscultation bilaterally; No wheeze  HEART: Regular rate and rhythm; No murmurs, rubs, or gallops  ABDOMEN: Soft, Nontender, Nondistended; Bowel sounds present  EXTREMITIES:  2+ Peripheral Pulses, No clubbing, cyanosis, 1+ edema  PSYCH: AAOx3  NEUROLOGY: non-focal  SKIN: No rashes or lesions    LABS:                        9.8    5.2   )-----------( 298      ( 15 Jul 2017 08:18 )             29.5     07-15    141  |  106  |  19  ----------------------------<  146<H>  4.5   |  23  |  0.85    Ca    9.3      15 Jul 2017 08:18    TPro  5.9<L>  /  Alb  3.8  /  TBili  0.2  /  DBili  x   /  AST  19  /  ALT  17  /  AlkPhos  55  07-15              RADIOLOGY & ADDITIONAL TESTS:    Imaging Personally Reviewed:    Consultant(s) Notes Reviewed:      Care Discussed with Consultants/Other Providers:

## 2017-07-15 NOTE — PROGRESS NOTE ADULT - SUBJECTIVE AND OBJECTIVE BOX
Pt is seen and examined  pt is awake and lying in bed/  pt seems comfortable and denies any complaints at this time  Tolerating chemotherapy well.        MEDICATIONS  (STANDING):  dextrose 5% + sodium chloride 0.45%. 1000 milliLiter(s) (50 mL/Hr) IV Continuous <Continuous>  allopurinol 300 milliGRAM(s) Oral daily  heparin  Injectable 5000 Unit(s) SubCutaneous every 12 hours  acyclovir   Tablet 400 milliGRAM(s) Oral <User Schedule>  trimethoprim  160 mG/sulfamethoxazole 800 mG 1 Tablet(s) Oral <User Schedule>  riTUXimab IVPB 618.75 milliGRAM(s) IV Intermittent once  ondansetron  IVPB 16 milliGRAM(s) IV Intermittent daily  etoposide IVPB 82.5 milliGRAM(s) IV Intermittent daily  DOXOrubicin IVPB w/vinCRIStine 16.5 milliGRAM(s) IV Intermittent daily  famotidine  IVPB 20 milliGRAM(s) IV Intermittent daily  predniSONE   Tablet 50 milliGRAM(s) Oral two times a day    MEDICATIONS  (PRN):  acetaminophen   Tablet 650 milliGRAM(s) Oral every 6 hours PRN For Temp greater than 38.5 C (101.3 F)  acetaminophen   Tablet. 650 milliGRAM(s) Oral every 6 hours PRN Mild Pain (1 - 3)  ondansetron    Tablet 4 milliGRAM(s) Oral every 8 hours PRN Nausea and/or Vomiting      Allergies    No Known Allergies    Intolerances        Vital Signs Last 24 Hrs  T(C): 36.7 (14 Jul 2017 22:00), Max: 36.8 (14 Jul 2017 17:00)  T(F): 98.1 (14 Jul 2017 22:00), Max: 98.2 (14 Jul 2017 17:00)  HR: 71 (14 Jul 2017 22:00) (71 - 88)  BP: 98/61 (14 Jul 2017 22:00) (98/61 - 110/70)  BP(mean): --  RR: 18 (14 Jul 2017 22:00) (18 - 18)  SpO2: 95% (14 Jul 2017 22:00) (95% - 98%)    PHYSICAL EXAM  General: adult in NAD  HEENT: clear oropharynx, anicteric sclera, pink conjunctiva  Neck: supple  CV: normal S1/S2 with no murmur rubs or gallops  Lungs: positive air movement b/l ant lungs,clear to auscultation, no wheezes, no rales  Abdomen: soft non-tender non-distended, no hepatosplenomegaly  Ext: no clubbing cyanosis or edema  Skin: no rashes and no petechiae  Neuro: alert and oriented X 4, no focal deficits  LABS:                          9.8    5.2   )-----------( 298      ( 15 Jul 2017 08:18 )             29.5         Mean Cell Volume : 100.0 fl  Mean Cell Hemoglobin : 33.1 pg  Mean Cell Hemoglobin Concentration : 33.1 gm/dL  Auto Neutrophil # : x  Auto Lymphocyte # : x  Auto Monocyte # : x  Auto Eosinophil # : x  Auto Basophil # : x  Auto Neutrophil % : x  Auto Lymphocyte % : x  Auto Monocyte % : x  Auto Eosinophil % : x  Auto Basophil % : x      07-15    141  |  106  |  19  ----------------------------<  146<H>  4.5   |  23  |  0.85    Ca    9.3      15 Jul 2017 08:18    TPro  5.9<L>  /  Alb  3.8  /  TBili  0.2  /  DBili  x   /  AST  19  /  ALT  17  /  AlkPhos  55  07-15          Lactate Dehydrogenase, Serum: 199 U/L (07-15 @ 08:18)  Lactate Dehydrogenase, Serum: 207 U/L (07-14 @ 11:39)                          BLOOD SMEAR INTERPRETATION:       RADIOLOGY & ADDITIONAL STUDIES:

## 2017-07-15 NOTE — PROGRESS NOTE ADULT - ASSESSMENT
Diffuse large B cell lymphoma. admitted for third cycle R-EPOCH. tolerated first 2 cycles well except for a moderate infusion reaction to Rituxan with the first cycle. No other significant medical problems.  Tolerating chemotherapy well.

## 2017-07-16 LAB
ALBUMIN SERPL ELPH-MCNC: 3.4 G/DL — SIGNIFICANT CHANGE UP (ref 3.3–5)
ALP SERPL-CCNC: 47 U/L — SIGNIFICANT CHANGE UP (ref 40–120)
ALT FLD-CCNC: 14 U/L RC — SIGNIFICANT CHANGE UP (ref 10–45)
ANION GAP SERPL CALC-SCNC: 11 MMOL/L — SIGNIFICANT CHANGE UP (ref 5–17)
AST SERPL-CCNC: 16 U/L — SIGNIFICANT CHANGE UP (ref 10–40)
BILIRUB SERPL-MCNC: 0.2 MG/DL — SIGNIFICANT CHANGE UP (ref 0.2–1.2)
BUN SERPL-MCNC: 20 MG/DL — SIGNIFICANT CHANGE UP (ref 7–23)
CALCIUM SERPL-MCNC: 8.9 MG/DL — SIGNIFICANT CHANGE UP (ref 8.4–10.5)
CHLORIDE SERPL-SCNC: 108 MMOL/L — SIGNIFICANT CHANGE UP (ref 96–108)
CO2 SERPL-SCNC: 23 MMOL/L — SIGNIFICANT CHANGE UP (ref 22–31)
CREAT SERPL-MCNC: 0.72 MG/DL — SIGNIFICANT CHANGE UP (ref 0.5–1.3)
GLUCOSE SERPL-MCNC: 140 MG/DL — HIGH (ref 70–99)
HCT VFR BLD CALC: 27.1 % — LOW (ref 34.5–45)
HGB BLD-MCNC: 9 G/DL — LOW (ref 11.5–15.5)
LDH SERPL L TO P-CCNC: 173 U/L — SIGNIFICANT CHANGE UP (ref 50–242)
MCHC RBC-ENTMCNC: 33.3 GM/DL — SIGNIFICANT CHANGE UP (ref 32–36)
MCHC RBC-ENTMCNC: 33.4 PG — SIGNIFICANT CHANGE UP (ref 27–34)
MCV RBC AUTO: 100 FL — SIGNIFICANT CHANGE UP (ref 80–100)
PLATELET # BLD AUTO: 241 K/UL — SIGNIFICANT CHANGE UP (ref 150–400)
POTASSIUM SERPL-MCNC: 4.1 MMOL/L — SIGNIFICANT CHANGE UP (ref 3.5–5.3)
POTASSIUM SERPL-SCNC: 4.1 MMOL/L — SIGNIFICANT CHANGE UP (ref 3.5–5.3)
PROT SERPL-MCNC: 5.3 G/DL — LOW (ref 6–8.3)
RBC # BLD: 2.7 M/UL — LOW (ref 3.8–5.2)
RBC # FLD: 19.5 % — HIGH (ref 10.3–14.5)
SODIUM SERPL-SCNC: 142 MMOL/L — SIGNIFICANT CHANGE UP (ref 135–145)
URATE SERPL-MCNC: 2.1 MG/DL — LOW (ref 2.5–7)
WBC # BLD: 7.5 K/UL — SIGNIFICANT CHANGE UP (ref 3.8–10.5)
WBC # FLD AUTO: 7.5 K/UL — SIGNIFICANT CHANGE UP (ref 3.8–10.5)

## 2017-07-16 RX ORDER — ACYCLOVIR SODIUM 500 MG
400 VIAL (EA) INTRAVENOUS
Qty: 0 | Refills: 0 | Status: DISCONTINUED | OUTPATIENT
Start: 2017-07-16 | End: 2017-07-18

## 2017-07-16 RX ADMIN — Medication 50 MILLIGRAM(S): at 17:32

## 2017-07-16 RX ADMIN — ONDANSETRON 116 MILLIGRAM(S): 8 TABLET, FILM COATED ORAL at 13:54

## 2017-07-16 RX ADMIN — Medication 300 MILLIGRAM(S): at 11:32

## 2017-07-16 RX ADMIN — HEPARIN SODIUM 5000 UNIT(S): 5000 INJECTION INTRAVENOUS; SUBCUTANEOUS at 17:32

## 2017-07-16 RX ADMIN — HEPARIN SODIUM 5000 UNIT(S): 5000 INJECTION INTRAVENOUS; SUBCUTANEOUS at 06:04

## 2017-07-16 RX ADMIN — FAMOTIDINE 100 MILLIGRAM(S): 10 INJECTION INTRAVENOUS at 13:53

## 2017-07-16 RX ADMIN — Medication 50 MILLIGRAM(S): at 06:04

## 2017-07-16 NOTE — PROGRESS NOTE ADULT - SUBJECTIVE AND OBJECTIVE BOX
Patient is a 76y old  Female who presents with a chief complaint of chemotherapy (14 Jul 2017 13:49)      SUBJECTIVE / OVERNIGHT EVENTS: c/o feet mild swelling.   Review of Systems  chest pain no  palpitations no  sob no  nausea no  headache no    MEDICATIONS  (STANDING):  dextrose 5% + sodium chloride 0.45%. 1000 milliLiter(s) (50 mL/Hr) IV Continuous <Continuous>  allopurinol 300 milliGRAM(s) Oral daily  heparin  Injectable 5000 Unit(s) SubCutaneous every 12 hours  riTUXimab IVPB 618.75 milliGRAM(s) IV Intermittent once  ondansetron  IVPB 16 milliGRAM(s) IV Intermittent daily  etoposide IVPB 82.5 milliGRAM(s) IV Intermittent daily  DOXOrubicin IVPB w/vinCRIStine 16.5 milliGRAM(s) IV Intermittent daily  famotidine  IVPB 20 milliGRAM(s) IV Intermittent daily  predniSONE   Tablet 50 milliGRAM(s) Oral two times a day  trimethoprim  160 mG/sulfamethoxazole 800 mG 1 Tablet(s) Oral <User Schedule>  acyclovir   Tablet 400 milliGRAM(s) Oral <User Schedule>    MEDICATIONS  (PRN):  acetaminophen   Tablet 650 milliGRAM(s) Oral every 6 hours PRN For Temp greater than 38.5 C (101.3 F)  acetaminophen   Tablet. 650 milliGRAM(s) Oral every 6 hours PRN Mild Pain (1 - 3)  ondansetron    Tablet 4 milliGRAM(s) Oral every 8 hours PRN Nausea and/or Vomiting      Vital Signs Last 24 Hrs  T(C): 37.3 (07-16-17 @ 09:19), Max: 37.3 (07-16-17 @ 09:19)  HR: 77 (07-16-17 @ 09:19) (76 - 77)  BP: 113/68 (07-16-17 @ 09:19) (104/64 - 120/73)  RR: 18 (07-16-17 @ 09:19) (18 - 18)  SpO2: 96% (07-16-17 @ 09:19) (96% - 98%)  CAPILLARY BLOOD GLUCOSE        I&O's Summary    15 Jul 2017 07:01  -  16 Jul 2017 07:00  --------------------------------------------------------  IN: 652 mL / OUT: 750 mL / NET: -98 mL        PHYSICAL EXAM:  GENERAL: NAD, well-developed  HEAD:  Atraumatic, Normocephalic  EYES: EOMI, PERRLA, conjunctiva and sclera clear  NECK: Supple, No JVD  CHEST/LUNG: Clear to auscultation bilaterally; No wheeze  HEART: Regular rate and rhythm; No murmurs, rubs, or gallops  ABDOMEN: Soft, Nontender, Nondistended; Bowel sounds present  EXTREMITIES:  2+ Peripheral Pulses, No clubbing, cyanosis, 1+ edema  PSYCH: AAOx3  NEUROLOGY: non-focal  SKIN: No rashes or lesions    LABS:                        9.0    7.5   )-----------( 241      ( 16 Jul 2017 06:45 )             27.1     07-16    142  |  108  |  20  ----------------------------<  140<H>  4.1   |  23  |  0.72    Ca    8.9      16 Jul 2017 06:45    TPro  5.3<L>  /  Alb  3.4  /  TBili  0.2  /  DBili  x   /  AST  16  /  ALT  14  /  AlkPhos  47  07-16              RADIOLOGY & ADDITIONAL TESTS:    Imaging Personally Reviewed:    Consultant(s) Notes Reviewed:      Care Discussed with Consultants/Other Providers:

## 2017-07-16 NOTE — ADVANCED PRACTICE NURSE CONSULT - ASSESSMENT
Cycle 2, day  #3/5,Height and weight verified. Lab results as per Md gurpreet aware of same. Vital signs stable prior to chemotherapy and  within acceptable parameters, see sunrise. Pt educated on the importance of saving urine, verbalizes good understanding. Pt  and spouse education  reinforced  on chemo regimen, drug effects and potential side effects, states understanding. Reports that she has been tolerating chemotherapy well without side effects. Pt wit Right Chest Wall Mediport site free from signs and symptoms of infection, good blood return obtained. Pre- medicated with Tylenol 650mg po and Benadryl 25mg IVP. Rituxan 375mg/t0=823.75mg , same given as per first time protocol, patient tolerated same well without immediate side effects noted.  This was followed by pre-medications of  Emend 150mg IVSS, ,Zofran 16 mg IVSS, Pepcid 20 mg IVSS and prednisone as directed. Etoposide 50 mg/m2=82.5 mg continuos infusion @ 23 ml/hr Vincristine 0.4 mg/m2 =0.66 mg continuos with Doxorubicin 10 mg/m2=16.5 mg continuos @ 23 ml/hr. Patient tolerating same well without side effects. Cycle 2, day  #3/5,Height and weight verified. Lab results as per Md gurpreet aware of same. Vital signs stable prior to chemotherapy and  within acceptable parameters, see sunrise. Pt educated on the importance of saving urine, verbalizes good understanding. Pt  and spouse education  reinforced  on chemo regimen, drug effects and potential side effects, states understanding. Reports that she has been tolerating chemotherapy well without side effects. Pt wit Right Chest Wall Mediport site free from signs and symptoms of infection, good blood return obtained. Pre- medicated with  ,Zofran 16 mg IVSS, Pepcid 20 mg IVSS and prednisone as directed. Etoposide 50 mg/m2=82.5 mg continuos infusion @ 23 ml/hr Vincristine 0.4 mg/m2 =0.66 mg continuos with Doxorubicin 10 mg/m2=16.5 mg continuos @ 23 ml/hr. Patient tolerating same well without side effects.

## 2017-07-16 NOTE — PROGRESS NOTE ADULT - SUBJECTIVE AND OBJECTIVE BOX
Pt is seen and examined  pt is awake and lying in bed/  pt seems comfortable and denies any complaints at this time  Tolerating therapy well        MEDICATIONS  (STANDING):  dextrose 5% + sodium chloride 0.45%. 1000 milliLiter(s) (50 mL/Hr) IV Continuous <Continuous>  allopurinol 300 milliGRAM(s) Oral daily  heparin  Injectable 5000 Unit(s) SubCutaneous every 12 hours  riTUXimab IVPB 618.75 milliGRAM(s) IV Intermittent once  ondansetron  IVPB 16 milliGRAM(s) IV Intermittent daily  etoposide IVPB 82.5 milliGRAM(s) IV Intermittent daily  DOXOrubicin IVPB w/vinCRIStine 16.5 milliGRAM(s) IV Intermittent daily  famotidine  IVPB 20 milliGRAM(s) IV Intermittent daily  predniSONE   Tablet 50 milliGRAM(s) Oral two times a day  trimethoprim  160 mG/sulfamethoxazole 800 mG 1 Tablet(s) Oral <User Schedule>  acyclovir   Tablet 400 milliGRAM(s) Oral <User Schedule>    MEDICATIONS  (PRN):  acetaminophen   Tablet 650 milliGRAM(s) Oral every 6 hours PRN For Temp greater than 38.5 C (101.3 F)  acetaminophen   Tablet. 650 milliGRAM(s) Oral every 6 hours PRN Mild Pain (1 - 3)  ondansetron    Tablet 4 milliGRAM(s) Oral every 8 hours PRN Nausea and/or Vomiting      Allergies    No Known Allergies    Intolerances        Vital Signs Last 24 Hrs  T(C): 37.3 (16 Jul 2017 09:19), Max: 37.3 (16 Jul 2017 09:19)  T(F): 99.1 (16 Jul 2017 09:19), Max: 99.1 (16 Jul 2017 09:19)  HR: 77 (16 Jul 2017 09:19) (76 - 77)  BP: 113/68 (16 Jul 2017 09:19) (104/64 - 120/73)  BP(mean): --  RR: 18 (16 Jul 2017 09:19) (18 - 18)  SpO2: 96% (16 Jul 2017 09:19) (96% - 98%)    PHYSICAL EXAM  General: adult in NAD  HEENT: clear oropharynx, anicteric sclera, pink conjunctiva  Neck: supple  CV: normal S1/S2 with no murmur rubs or gallops  Lungs: positive air movement b/l ant lungs,clear to auscultation, no wheezes, no rales  Abdomen: soft non-tender non-distended, no hepatosplenomegaly  Ext: no clubbing cyanosis or edema  Skin: no rashes and no petechiae  Neuro: alert and oriented X 4, no focal deficits  LABS:                          9.0    7.5   )-----------( 241      ( 16 Jul 2017 06:45 )             27.1         Mean Cell Volume : 100.0 fl  Mean Cell Hemoglobin : 33.4 pg  Mean Cell Hemoglobin Concentration : 33.3 gm/dL  Auto Neutrophil # : x  Auto Lymphocyte # : x  Auto Monocyte # : x  Auto Eosinophil # : x  Auto Basophil # : x  Auto Neutrophil % : x  Auto Lymphocyte % : x  Auto Monocyte % : x  Auto Eosinophil % : x  Auto Basophil % : x      07-16    142  |  108  |  20  ----------------------------<  140<H>  4.1   |  23  |  0.72    Ca    8.9      16 Jul 2017 06:45    TPro  5.3<L>  /  Alb  3.4  /  TBili  0.2  /  DBili  x   /  AST  16  /  ALT  14  /  AlkPhos  47  07-16          Lactate Dehydrogenase, Serum: 173 U/L (07-16 @ 06:45)  Lactate Dehydrogenase, Serum: 199 U/L (07-15 @ 08:18)  Lactate Dehydrogenase, Serum: 207 U/L (07-14 @ 11:39)                          BLOOD SMEAR INTERPRETATION:       RADIOLOGY & ADDITIONAL STUDIES:

## 2017-07-16 NOTE — PROGRESS NOTE ADULT - PROBLEM SELECTOR PLAN 1
Monitor CBC, CMP, LDH, Uric acid  IV hydration during chemotherapy  Premeds antiemetics as ordered  Anemia noted, no need for transfusion at this point  Discussed possible need for transfusion in the near future  continue chemotherapy (adriamycin, vincristine, Etoposide today)  OK to proceed with chemotherapy.

## 2017-07-16 NOTE — PROGRESS NOTE ADULT - ASSESSMENT
76 f with b cell lymphoma for chemoRx  IV hydration during chemoRx  prophylaxis with Bactrim/Valtrex  follow cbc, electrolytes  oncology follow  feet edema is mild- needs hydration during chemoRx. Keep legs elevated. Avoid diuresis.  discussed with patient/ CANDICE Wolff MD pager [phone]

## 2017-07-17 LAB
ALBUMIN SERPL ELPH-MCNC: 3.4 G/DL — SIGNIFICANT CHANGE UP (ref 3.3–5)
ALP SERPL-CCNC: 44 U/L — SIGNIFICANT CHANGE UP (ref 40–120)
ALT FLD-CCNC: 16 U/L RC — SIGNIFICANT CHANGE UP (ref 10–45)
ANION GAP SERPL CALC-SCNC: 9 MMOL/L — SIGNIFICANT CHANGE UP (ref 5–17)
AST SERPL-CCNC: 19 U/L — SIGNIFICANT CHANGE UP (ref 10–40)
BILIRUB SERPL-MCNC: 0.2 MG/DL — SIGNIFICANT CHANGE UP (ref 0.2–1.2)
BUN SERPL-MCNC: 20 MG/DL — SIGNIFICANT CHANGE UP (ref 7–23)
CALCIUM SERPL-MCNC: 9.1 MG/DL — SIGNIFICANT CHANGE UP (ref 8.4–10.5)
CHLORIDE SERPL-SCNC: 106 MMOL/L — SIGNIFICANT CHANGE UP (ref 96–108)
CO2 SERPL-SCNC: 26 MMOL/L — SIGNIFICANT CHANGE UP (ref 22–31)
CREAT SERPL-MCNC: 0.72 MG/DL — SIGNIFICANT CHANGE UP (ref 0.5–1.3)
GLUCOSE SERPL-MCNC: 115 MG/DL — HIGH (ref 70–99)
HCT VFR BLD CALC: 26.9 % — LOW (ref 34.5–45)
HGB BLD-MCNC: 9.1 G/DL — LOW (ref 11.5–15.5)
LDH SERPL L TO P-CCNC: 163 U/L — SIGNIFICANT CHANGE UP (ref 50–242)
MCHC RBC-ENTMCNC: 33.8 GM/DL — SIGNIFICANT CHANGE UP (ref 32–36)
MCHC RBC-ENTMCNC: 33.8 PG — SIGNIFICANT CHANGE UP (ref 27–34)
MCV RBC AUTO: 99.9 FL — SIGNIFICANT CHANGE UP (ref 80–100)
PLATELET # BLD AUTO: 201 K/UL — SIGNIFICANT CHANGE UP (ref 150–400)
POTASSIUM SERPL-MCNC: 4 MMOL/L — SIGNIFICANT CHANGE UP (ref 3.5–5.3)
POTASSIUM SERPL-SCNC: 4 MMOL/L — SIGNIFICANT CHANGE UP (ref 3.5–5.3)
PROT SERPL-MCNC: 5.1 G/DL — LOW (ref 6–8.3)
RBC # BLD: 2.69 M/UL — LOW (ref 3.8–5.2)
RBC # FLD: 19 % — HIGH (ref 10.3–14.5)
SODIUM SERPL-SCNC: 141 MMOL/L — SIGNIFICANT CHANGE UP (ref 135–145)
URATE SERPL-MCNC: 2.3 MG/DL — LOW (ref 2.5–7)
WBC # BLD: 5.8 K/UL — SIGNIFICANT CHANGE UP (ref 3.8–10.5)
WBC # FLD AUTO: 5.8 K/UL — SIGNIFICANT CHANGE UP (ref 3.8–10.5)

## 2017-07-17 RX ADMIN — Medication 1 TABLET(S): at 17:33

## 2017-07-17 RX ADMIN — Medication 50 MILLIGRAM(S): at 06:22

## 2017-07-17 RX ADMIN — HEPARIN SODIUM 5000 UNIT(S): 5000 INJECTION INTRAVENOUS; SUBCUTANEOUS at 17:32

## 2017-07-17 RX ADMIN — Medication 300 MILLIGRAM(S): at 12:10

## 2017-07-17 RX ADMIN — ONDANSETRON 116 MILLIGRAM(S): 8 TABLET, FILM COATED ORAL at 13:00

## 2017-07-17 RX ADMIN — FAMOTIDINE 100 MILLIGRAM(S): 10 INJECTION INTRAVENOUS at 12:10

## 2017-07-17 RX ADMIN — HEPARIN SODIUM 5000 UNIT(S): 5000 INJECTION INTRAVENOUS; SUBCUTANEOUS at 06:22

## 2017-07-17 RX ADMIN — Medication 50 MILLIGRAM(S): at 17:33

## 2017-07-17 RX ADMIN — Medication 1 TABLET(S): at 06:22

## 2017-07-17 NOTE — PROGRESS NOTE ADULT - ASSESSMENT
Diffuse large B cell lymphoma. admitted for third cycle R-EPOCH. tolerated first 2 cycles well except for a moderate infusion reaction to Rituxan with the first cycle. No other significant medical problems.  Tolerating chemotherapy well. Diffuse large B cell lymphoma getting for third cycle R-EPOCH.

## 2017-07-17 NOTE — PROGRESS NOTE ADULT - SUBJECTIVE AND OBJECTIVE BOX
Patient admitted for R-EPOCH chemo # 3 for DLBCL    Pt is seen and examined  pt is awake and lying in bed  pt seems comfortable and denies any complaints at this time  Tolerating therapy well      Allergies    No Known Allergies    Intolerances        Vital Signs Last 24 Hrs  T(C): 36.4 (17 Jul 2017 07:27), Max: 36.8 (16 Jul 2017 20:13)  T(F): 97.6 (17 Jul 2017 07:27), Max: 98.2 (16 Jul 2017 20:13)  HR: 66 (17 Jul 2017 07:27) (66 - 75)  BP: 126/79 (17 Jul 2017 07:27) (107/58 - 126/79)  BP(mean): --  RR: 18 (17 Jul 2017 07:27) (18 - 18)  SpO2: 99% (17 Jul 2017 07:27) (95% - 99%)          PHYSICAL EXAM  General: adult in NAD  HEENT: clear oropharynx, anicteric sclera, pink conjunctiva  Neck: supple  CV: normal S1/S2 with no murmur rubs or gallops  Lungs: positive air movement b/l ant lungs,clear to auscultation, no wheezes, no rales  Abdomen: soft non-tender non-distended, no hepatosplenomegaly  Ext: no clubbing cyanosis or edema  Skin: no rashes and no petechiae  Neuro: alert and oriented X 4, no focal deficits        MEDICATIONS  (STANDING):  dextrose 5% + sodium chloride 0.45%. 1000 milliLiter(s) (50 mL/Hr) IV Continuous <Continuous>  allopurinol 300 milliGRAM(s) Oral daily  heparin  Injectable 5000 Unit(s) SubCutaneous every 12 hours  riTUXimab IVPB 618.75 milliGRAM(s) IV Intermittent once  ondansetron  IVPB 16 milliGRAM(s) IV Intermittent daily  etoposide IVPB 82.5 milliGRAM(s) IV Intermittent daily  DOXOrubicin IVPB w/vinCRIStine 16.5 milliGRAM(s) IV Intermittent daily  famotidine  IVPB 20 milliGRAM(s) IV Intermittent daily  predniSONE   Tablet 50 milliGRAM(s) Oral two times a day  trimethoprim  160 mG/sulfamethoxazole 800 mG 1 Tablet(s) Oral <User Schedule>  acyclovir   Tablet 400 milliGRAM(s) Oral <User Schedule>    MEDICATIONS  (PRN):  acetaminophen   Tablet 650 milliGRAM(s) Oral every 6 hours PRN For Temp greater than 38.5 C (101.3 F)  acetaminophen   Tablet. 650 milliGRAM(s) Oral every 6 hours PRN Mild Pain (1 - 3)  ondansetron    Tablet 4 milliGRAM(s) Oral every 8 hours PRN Nausea and/or Vomiting      Allergies    No Known Allergies    Intolerances        LABS:                          9.1    5.8   )-----------( 201      ( 17 Jul 2017 06:55 )             26.9         Mean Cell Volume : 99.9 fl  Mean Cell Hemoglobin : 33.8 pg  Mean Cell Hemoglobin Concentration : 33.8 gm/dL        07-17    141  |  106  |  20  ----------------------------<  115<H>  4.0   |  26  |  0.72    Ca    9.1      17 Jul 2017 06:55    TPro  5.1<L>  /  Alb  3.4  /  TBili  0.2  /  DBili  x   /  AST  19  /  ALT  16  /  AlkPhos  44  07-17        Lactate Dehydrogenase, Serum: 163 U/L (07-17 @ 06:55) Patient admitted for R-EPOCH chemo # 3 for DLBCL day #4    Pt is seen and examined  pt is awake and sitting in chair  pt seems comfortable   Tolerating therapy well  chronic numbness in jaw area  Intermittent tingling in extremities  No diarrhea, constipation  Good po intake    Vital Signs Last 24 Hrs  T(C): 36.4 (17 Jul 2017 07:27), Max: 36.8 (16 Jul 2017 20:13)  T(F): 97.6 (17 Jul 2017 07:27), Max: 98.2 (16 Jul 2017 20:13)  HR: 66 (17 Jul 2017 07:27) (66 - 75)  BP: 126/79 (17 Jul 2017 07:27) (107/58 - 126/79)  BP(mean): --  RR: 18 (17 Jul 2017 07:27) (18 - 18)  SpO2: 99% (17 Jul 2017 07:27) (95% - 99%)          PHYSICAL EXAM  General: adult in NAD  HEENT: clear oropharynx, anicteric sclera, pink conjunctiva, no mucositis  Neck: supple  CV: normal S1/S2 with no murmur rubs or gallops  Lungs: positive air movement b/l ant lungs,clear to auscultation, no wheezes, no rales  Abdomen: soft non-tender non-distended, no hepatosplenomegaly  Ext: no clubbing cyanosis, 1+ b/l p. edema  Skin: no rashes and no petechiae  Neuro: alert and oriented X 4, no focal deficits        MEDICATIONS  (STANDING):  dextrose 5% + sodium chloride 0.45%. 1000 milliLiter(s) (50 mL/Hr) IV Continuous <Continuous>  allopurinol 300 milliGRAM(s) Oral daily  heparin  Injectable 5000 Unit(s) SubCutaneous every 12 hours  riTUXimab IVPB 618.75 milliGRAM(s) IV Intermittent once  ondansetron  IVPB 16 milliGRAM(s) IV Intermittent daily  etoposide IVPB 82.5 milliGRAM(s) IV Intermittent daily  DOXOrubicin IVPB w/vinCRIStine 16.5 milliGRAM(s) IV Intermittent daily  famotidine  IVPB 20 milliGRAM(s) IV Intermittent daily  predniSONE   Tablet 50 milliGRAM(s) Oral two times a day  trimethoprim  160 mG/sulfamethoxazole 800 mG 1 Tablet(s) Oral <User Schedule>  acyclovir   Tablet 400 milliGRAM(s) Oral <User Schedule>    MEDICATIONS  (PRN):  acetaminophen   Tablet 650 milliGRAM(s) Oral every 6 hours PRN For Temp greater than 38.5 C (101.3 F)  acetaminophen   Tablet. 650 milliGRAM(s) Oral every 6 hours PRN Mild Pain (1 - 3)  ondansetron    Tablet 4 milliGRAM(s) Oral every 8 hours PRN Nausea and/or Vomiting      Allergies    No Known Allergies    Intolerances        LABS:                          9.1    5.8   )-----------( 201      ( 17 Jul 2017 06:55 )             26.9         Mean Cell Volume : 99.9 fl  Mean Cell Hemoglobin : 33.8 pg  Mean Cell Hemoglobin Concentration : 33.8 gm/dL        07-17    141  |  106  |  20  ----------------------------<  115<H>  4.0   |  26  |  0.72    Ca    9.1      17 Jul 2017 06:55    TPro  5.1<L>  /  Alb  3.4  /  TBili  0.2  /  DBili  x   /  AST  19  /  ALT  16  /  AlkPhos  44  07-17    Uric Acid, Serum (07.17.17 @ 06:55)    Uric Acid, Serum: 2.3 mg/dL        Lactate Dehydrogenase, Serum: 163 U/L (07-17 @ 06:55)

## 2017-07-17 NOTE — PROGRESS NOTE ADULT - SUBJECTIVE AND OBJECTIVE BOX
Patient is a 76y old  Female who presents with a chief complaint of chemotherapy (14 Jul 2017 13:49)      SUBJECTIVE / OVERNIGHT EVENTS: Comfortable without new complaints.    Review of Systems  chest pain no  palpitations no  sob no  nausea no  headache no    MEDICATIONS  (STANDING):  dextrose 5% + sodium chloride 0.45%. 1000 milliLiter(s) (50 mL/Hr) IV Continuous <Continuous>  allopurinol 300 milliGRAM(s) Oral daily  heparin  Injectable 5000 Unit(s) SubCutaneous every 12 hours  riTUXimab IVPB 618.75 milliGRAM(s) IV Intermittent once  ondansetron  IVPB 16 milliGRAM(s) IV Intermittent daily  etoposide IVPB 82.5 milliGRAM(s) IV Intermittent daily  DOXOrubicin IVPB w/vinCRIStine 16.5 milliGRAM(s) IV Intermittent daily  famotidine  IVPB 20 milliGRAM(s) IV Intermittent daily  predniSONE   Tablet 50 milliGRAM(s) Oral two times a day  trimethoprim  160 mG/sulfamethoxazole 800 mG 1 Tablet(s) Oral <User Schedule>  acyclovir   Tablet 400 milliGRAM(s) Oral <User Schedule>    MEDICATIONS  (PRN):  acetaminophen   Tablet 650 milliGRAM(s) Oral every 6 hours PRN For Temp greater than 38.5 C (101.3 F)  acetaminophen   Tablet. 650 milliGRAM(s) Oral every 6 hours PRN Mild Pain (1 - 3)  ondansetron    Tablet 4 milliGRAM(s) Oral every 8 hours PRN Nausea and/or Vomiting      Vital Signs Last 24 Hrs  T(C): 36.5 (07-17-17 @ 15:07), Max: 36.8 (07-16-17 @ 20:13)  HR: 70 (07-17-17 @ 15:07) (66 - 75)  BP: 124/77 (07-17-17 @ 15:07) (107/58 - 126/79)  RR: 18 (07-17-17 @ 15:07) (18 - 18)  SpO2: 98% (07-17-17 @ 15:07) (95% - 99%)  CAPILLARY BLOOD GLUCOSE        I&O's Summary    16 Jul 2017 07:01  -  17 Jul 2017 07:00  --------------------------------------------------------  IN: 1194 mL / OUT: 2650 mL / NET: -1456 mL    17 Jul 2017 07:01  -  17 Jul 2017 16:31  --------------------------------------------------------  IN: 540 mL / OUT: 600 mL / NET: -60 mL        PHYSICAL EXAM:  GENERAL: NAD, well-developed  HEAD:  Atraumatic, Normocephalic  EYES: EOMI, PERRLA, conjunctiva and sclera clear  NECK: Supple, No JVD  CHEST/LUNG: Clear to auscultation bilaterally; No wheeze  HEART: Regular rate and rhythm; No murmurs, rubs, or gallops  ABDOMEN: Soft, Nontender, Nondistended; Bowel sounds present  EXTREMITIES:  2+ Peripheral Pulses, No clubbing, cyanosis, or edema  PSYCH: AAOx3  NEUROLOGY: non-focal  SKIN: No rashes or lesions    LABS:                        9.1    5.8   )-----------( 201      ( 17 Jul 2017 06:55 )             26.9     07-17    141  |  106  |  20  ----------------------------<  115<H>  4.0   |  26  |  0.72    Ca    9.1      17 Jul 2017 06:55    TPro  5.1<L>  /  Alb  3.4  /  TBili  0.2  /  DBili  x   /  AST  19  /  ALT  16  /  AlkPhos  44  07-17              RADIOLOGY & ADDITIONAL TESTS:    Imaging Personally Reviewed:    Consultant(s) Notes Reviewed:      Care Discussed with Consultants/Other Providers:

## 2017-07-17 NOTE — ADVANCED PRACTICE NURSE CONSULT - RECOMMEDATIONS
full report given to area nurse, aware to monitor
Full report given to area nurse, aware to monitor.
Full report given to area nurse, aware to monitor.
Pt tolerated well report given to area nurse strict i/o monitor for side effects

## 2017-07-17 NOTE — PROGRESS NOTE ADULT - ASSESSMENT
76 f with b cell lymphoma for chemoRx  IV hydration during chemoRx  prophylaxis with Bactrim/Valtrex  follow cbc, electrolytes  oncology follow  feet edema is mild- needs hydration during chemoRx. Keep legs elevated. Avoid diuresis.  discussed with patient/ QA  DCP home after chemo in am if stable.  Sea Wolff MD pager [phone]

## 2017-07-17 NOTE — ADVANCED PRACTICE NURSE CONSULT - ASSESSMENT
Cycle 2, day  #4/5,Height and weight verified. Lab results as per Md gurpreet aware of same. Vital signs stable prior to chemotherapy and  within acceptable parameters, see sunrise. Pt educated on the importance of saving urine, verbalizes good understanding. Pt  and spouse education  reinforced  on chemo regimen, drug effects and potential side effects, states understanding. Reports that she has been tolerating chemotherapy well without side effects. Pt wit Right Chest Wall Mediport site free from signs and symptoms of infection, good blood return obtained. Pre- medicated with  Zofran 16 mg IVSS, Pepcid 20 mg IVSS and prednisone as directed. Etoposide 50 mg/m2=82.5 mg continuos infusion @ 23 ml/hr Vincristine 0.4 mg/m2 =0.66 mg continuos with Doxorubicin 10 mg/m2=16.5 mg continuos @ 23 ml/hr. Patient tolerating same well without side effects.

## 2017-07-18 ENCOUNTER — TRANSCRIPTION ENCOUNTER (OUTPATIENT)
Age: 77
End: 2017-07-18

## 2017-07-18 VITALS
SYSTOLIC BLOOD PRESSURE: 158 MMHG | OXYGEN SATURATION: 95 % | HEART RATE: 82 BPM | DIASTOLIC BLOOD PRESSURE: 81 MMHG | RESPIRATION RATE: 18 BRPM | TEMPERATURE: 98 F

## 2017-07-18 LAB
ALBUMIN SERPL ELPH-MCNC: 3.5 G/DL — SIGNIFICANT CHANGE UP (ref 3.3–5)
ALP SERPL-CCNC: 44 U/L — SIGNIFICANT CHANGE UP (ref 40–120)
ALT FLD-CCNC: 16 U/L RC — SIGNIFICANT CHANGE UP (ref 10–45)
ANION GAP SERPL CALC-SCNC: 12 MMOL/L — SIGNIFICANT CHANGE UP (ref 5–17)
AST SERPL-CCNC: 18 U/L — SIGNIFICANT CHANGE UP (ref 10–40)
BILIRUB SERPL-MCNC: 0.3 MG/DL — SIGNIFICANT CHANGE UP (ref 0.2–1.2)
BUN SERPL-MCNC: 20 MG/DL — SIGNIFICANT CHANGE UP (ref 7–23)
CALCIUM SERPL-MCNC: 9.5 MG/DL — SIGNIFICANT CHANGE UP (ref 8.4–10.5)
CHLORIDE SERPL-SCNC: 104 MMOL/L — SIGNIFICANT CHANGE UP (ref 96–108)
CO2 SERPL-SCNC: 26 MMOL/L — SIGNIFICANT CHANGE UP (ref 22–31)
CREAT SERPL-MCNC: 0.66 MG/DL — SIGNIFICANT CHANGE UP (ref 0.5–1.3)
GLUCOSE SERPL-MCNC: 112 MG/DL — HIGH (ref 70–99)
HCT VFR BLD CALC: 27.3 % — LOW (ref 34.5–45)
HGB BLD-MCNC: 9.1 G/DL — LOW (ref 11.5–15.5)
LDH SERPL L TO P-CCNC: 169 U/L — SIGNIFICANT CHANGE UP (ref 50–242)
MCHC RBC-ENTMCNC: 32.7 PG — SIGNIFICANT CHANGE UP (ref 27–34)
MCHC RBC-ENTMCNC: 33.3 GM/DL — SIGNIFICANT CHANGE UP (ref 32–36)
MCV RBC AUTO: 98 FL — SIGNIFICANT CHANGE UP (ref 80–100)
PLATELET # BLD AUTO: 209 K/UL — SIGNIFICANT CHANGE UP (ref 150–400)
POTASSIUM SERPL-MCNC: 4 MMOL/L — SIGNIFICANT CHANGE UP (ref 3.5–5.3)
POTASSIUM SERPL-SCNC: 4 MMOL/L — SIGNIFICANT CHANGE UP (ref 3.5–5.3)
PROT SERPL-MCNC: 5.1 G/DL — LOW (ref 6–8.3)
RBC # BLD: 2.79 M/UL — LOW (ref 3.8–5.2)
RBC # FLD: 18.6 % — HIGH (ref 10.3–14.5)
SODIUM SERPL-SCNC: 142 MMOL/L — SIGNIFICANT CHANGE UP (ref 135–145)
URATE SERPL-MCNC: 2.3 MG/DL — LOW (ref 2.5–7)
WBC # BLD: 4.1 K/UL — SIGNIFICANT CHANGE UP (ref 3.8–10.5)
WBC # FLD AUTO: 4.1 K/UL — SIGNIFICANT CHANGE UP (ref 3.8–10.5)

## 2017-07-18 PROCEDURE — 80053 COMPREHEN METABOLIC PANEL: CPT

## 2017-07-18 PROCEDURE — 85027 COMPLETE CBC AUTOMATED: CPT

## 2017-07-18 PROCEDURE — 83615 LACTATE (LD) (LDH) ENZYME: CPT

## 2017-07-18 PROCEDURE — 84550 ASSAY OF BLOOD/URIC ACID: CPT

## 2017-07-18 RX ORDER — ACYCLOVIR SODIUM 500 MG
1 VIAL (EA) INTRAVENOUS
Qty: 0 | Refills: 0 | COMMUNITY
Start: 2017-07-18

## 2017-07-18 RX ORDER — ONDANSETRON 8 MG/1
1 TABLET, FILM COATED ORAL
Qty: 0 | Refills: 0 | COMMUNITY

## 2017-07-18 RX ORDER — ALLOPURINOL 300 MG
1 TABLET ORAL
Qty: 0 | Refills: 0 | COMMUNITY
Start: 2017-07-18

## 2017-07-18 RX ORDER — CYCLOPHOSPHAMIDE 100 MG
825 VIAL (EA) INTRAVENOUS ONCE
Qty: 0 | Refills: 0 | Status: DISCONTINUED | OUTPATIENT
Start: 2017-07-18 | End: 2017-07-18

## 2017-07-18 RX ADMIN — Medication 50 MILLIGRAM(S): at 08:30

## 2017-07-18 RX ADMIN — Medication 400 MILLIGRAM(S): at 05:22

## 2017-07-18 RX ADMIN — Medication 300 MILLIGRAM(S): at 11:44

## 2017-07-18 RX ADMIN — SODIUM CHLORIDE 50 MILLILITER(S): 9 INJECTION, SOLUTION INTRAVENOUS at 06:44

## 2017-07-18 RX ADMIN — FAMOTIDINE 100 MILLIGRAM(S): 10 INJECTION INTRAVENOUS at 11:43

## 2017-07-18 RX ADMIN — SODIUM CHLORIDE 50 MILLILITER(S): 9 INJECTION, SOLUTION INTRAVENOUS at 08:32

## 2017-07-18 RX ADMIN — HEPARIN SODIUM 5000 UNIT(S): 5000 INJECTION INTRAVENOUS; SUBCUTANEOUS at 05:23

## 2017-07-18 RX ADMIN — ONDANSETRON 116 MILLIGRAM(S): 8 TABLET, FILM COATED ORAL at 12:40

## 2017-07-18 NOTE — DISCHARGE NOTE ADULT - PATIENT PORTAL LINK FT
“You can access the Follow[employer] Patient Portal, offered by [facility], by registering with the following website: [email or web address]”

## 2017-07-18 NOTE — DISCHARGE NOTE ADULT - CARE PLAN
Principal Discharge DX:	Diffuse large B-cell lymphoma, unspecified body region  Goal:	s/p chemotherapy  Instructions for follow-up, activity and diet:	Follow up in the infusion center tomorrow and DR [place] in 1 week. Principal Discharge DX:	Diffuse large B-cell lymphoma, unspecified body region  Goal:	s/p chemotherapy  Instructions for follow-up, activity and diet:	Follow up in the infusion center tomorrow and DR [place] in 1 week. Principal Discharge DX:	Diffuse large B-cell lymphoma, unspecified body region  Goal:	s/p chemotherapy  Instructions for follow-up, activity and diet:	Follow up in the infusion center tomorrow and DR [place] in 1 week.

## 2017-07-18 NOTE — ADVANCED PRACTICE NURSE CONSULT - ASSESSMENT
Cycle # 3 day 5/5 .Height and weight verified. Lab results as per Md gurpreet aware of same. Vital signs stable prior to the start of chemotherapy, see sunrise.  Pt educated on the importance of saving urine, verbalize understanding of same.Pt education done re chemo regimen, drug effects and potential side effects, written material provided, pt states understanding. Pt reports the he/she has tolerated previous chemotherapy without side effects.Pt with rt chest wall mediport  site free from signs and symptoms of infection, positive blood return obtained pt completed with day 4 of continuous infusion with no adverse effects. Pre-medicated with pepcid 20mg ivss, zofran 16mg ivss pt started on cytoxan 500mg/k8=347mt iv over 1hr intiated at 1300 copmleted infusion with no adverse effects post v/s stable report given to the area nurse Cycle # 3 day 5/5 .Height and weight verified. Lab results as per Md gurpreet aware of same. Vital signs stable prior to the start of chemotherapy, see sunrise.  Pt educated on the importance of saving urine, verbalize understanding of same.Pt education done re chemo regimen, drug effects and potential side effects, written material provided, pt states understanding. Pt reports the he/she has tolerated previous chemotherapy without side effects.Pt with rt chest wall mediport  site free from signs and symptoms of infection, positive blood return obtained pt completed with day 4 of continuous infusion with no adverse effects. Pre-medicated with pepcid 20mg ivss, zofran 16mg ivss pt started on cytoxan 500mg/r0=401qt iv over 1hr intiated at 1300 completed infusion with no adverse effects post v/s stable report given to the area nurse

## 2017-07-18 NOTE — PROGRESS NOTE ADULT - PROBLEM SELECTOR PLAN 1
Daily CBC, CMP, LDH, Uric acid  IV hydration during chemotherapy  Premeds antiemetics as ordered  Anemia noted, no need for transfusion at this point  proceed with chemo  Plan for neulasta day after tomorrow in infusion center at office  Questions from patient re. future treatment answered  Ok to tolerate mild edema - hold iv fluid, prn lasix if worsening sob, leg swelling  D/w chemo nurse Daily CBC, CMP, LDH, Uric acid - stable  IV hydration during chemotherapy  Premeds antiemetics as ordered  Anemia noted, no need for transfusion at this point  proceed with chemo  Plan for neulasta  tomorrow in infusion center at office  Ok to tolerate mild edema - hold iv fluid, prn lasix if worsening sob, leg swelling  D/w  nurse  D/c home if chemo uneventful

## 2017-07-18 NOTE — DISCHARGE NOTE ADULT - CARE PROVIDER_API CALL
Campos Ricks), Internal Medicine; Medical Oncology  [address] [place] [address]  Phone: [phone]  Fax: [phone]

## 2017-07-18 NOTE — PROGRESS NOTE ADULT - SUBJECTIVE AND OBJECTIVE BOX
Patient is a 76y old  Female who presents with a chief complaint of chemotherapy (18 Jul 2017 10:37)      SUBJECTIVE / OVERNIGHT EVENTS Comfortable without new complaints. Tolerating chemotherapy.  Review of Systems  chest pain no  palpitations no  sob no  nausea no  headache no    MEDICATIONS  (STANDING):  dextrose 5% + sodium chloride 0.45%. 1000 milliLiter(s) (50 mL/Hr) IV Continuous <Continuous>  allopurinol 300 milliGRAM(s) Oral daily  heparin  Injectable 5000 Unit(s) SubCutaneous every 12 hours  riTUXimab IVPB 618.75 milliGRAM(s) IV Intermittent once  ondansetron  IVPB 16 milliGRAM(s) IV Intermittent daily  etoposide IVPB 82.5 milliGRAM(s) IV Intermittent daily  DOXOrubicin IVPB w/vinCRIStine 16.5 milliGRAM(s) IV Intermittent daily  famotidine  IVPB 20 milliGRAM(s) IV Intermittent daily  predniSONE   Tablet 50 milliGRAM(s) Oral two times a day  trimethoprim  160 mG/sulfamethoxazole 800 mG 1 Tablet(s) Oral <User Schedule>  acyclovir   Tablet 400 milliGRAM(s) Oral <User Schedule>  cyclophosphamide IVPB 825 milliGRAM(s) IV Intermittent once    MEDICATIONS  (PRN):  acetaminophen   Tablet 650 milliGRAM(s) Oral every 6 hours PRN For Temp greater than 38.5 C (101.3 F)  acetaminophen   Tablet. 650 milliGRAM(s) Oral every 6 hours PRN Mild Pain (1 - 3)  ondansetron    Tablet 4 milliGRAM(s) Oral every 8 hours PRN Nausea and/or Vomiting      Vital Signs Last 24 Hrs  T(C): 36.6 (07-18-17 @ 07:43), Max: 36.6 (07-18-17 @ 07:43)  HR: 72 (07-18-17 @ 07:43) (70 - 72)  BP: 131/83 (07-18-17 @ 07:43) (124/77 - 138/78)  RR: 18 (07-18-17 @ 07:43) (16 - 18)  SpO2: 98% (07-18-17 @ 07:43) (97% - 98%)  CAPILLARY BLOOD GLUCOSE        I&O's Summary    17 Jul 2017 07:01  -  18 Jul 2017 07:00  --------------------------------------------------------  IN: 2782 mL / OUT: 3600 mL / NET: -818 mL    18 Jul 2017 07:01  -  18 Jul 2017 11:45  --------------------------------------------------------  IN: 320 mL / OUT: 350 mL / NET: -30 mL        PHYSICAL EXAM:  GENERAL: NAD, well-developed  HEAD:  Atraumatic, Normocephalic  EYES: EOMI, PERRLA, conjunctiva and sclera clear  NECK: Supple, No JVD  CHEST/LUNG: Clear to auscultation bilaterally; No wheeze  HEART: Regular rate and rhythm; No murmurs, rubs, or gallops  ABDOMEN: Soft, Nontender, Nondistended; Bowel sounds present  EXTREMITIES:  2+ Peripheral Pulses, No clubbing, cyanosis, or edema  PSYCH: AAOx3  NEUROLOGY: non-focal  SKIN: No rashes or lesions    LABS:                        9.1    4.1   )-----------( 209      ( 18 Jul 2017 07:16 )             27.3     07-18    142  |  104  |  20  ----------------------------<  112<H>  4.0   |  26  |  0.66    Ca    9.5      18 Jul 2017 07:16    TPro  5.1<L>  /  Alb  3.5  /  TBili  0.3  /  DBili  x   /  AST  18  /  ALT  16  /  AlkPhos  44  07-18              RADIOLOGY & ADDITIONAL TESTS:    Imaging Personally Reviewed:    Consultant(s) Notes Reviewed:      Care Discussed with Consultants/Other Providers:

## 2017-07-18 NOTE — PROGRESS NOTE ADULT - SUBJECTIVE AND OBJECTIVE BOX
Patient admitted for R-EPOCH chemo # 3 for DLBCL day #5    Pt is seen and examined  pt is awake and sitting in chair  pt seems comfortable   Tolerating therapy well  chronic numbness in jaw area  Intermittent tingling in extremities  No diarrhea, constipation  Good po intake    Allergies    No Known Allergies    Intolerances          LABS:                          9.1    4.1   )-----------( 209      ( 18 Jul 2017 07:16 )             27.3         Mean Cell Volume : 98.0 fl  Mean Cell Hemoglobin : 32.7 pg  Mean Cell Hemoglobin Concentration : 33.3 gm/dL        07-18    142  |  104  |  20  ----------------------------<  112<H>  4.0   |  26  |  0.66    Ca    9.5      18 Jul 2017 07:16    TPro  5.1<L>  /  Alb  3.5  /  TBili  0.3  /  DBili  x   /  AST  18  /  ALT  16  /  AlkPhos  44  07-18    Lactate Dehydrogenase, Serum: 169 U/L (07-18 @ 07:16)      Uric Acid, Serum (07.18.17 @ 07:16)    Uric Acid, Serum: 2.3 mg/dL    Vital Signs Last 24 Hrs  T(C): 36.6 (18 Jul 2017 07:43), Max: 36.6 (18 Jul 2017 07:43)  T(F): 97.9 (18 Jul 2017 07:43), Max: 97.9 (18 Jul 2017 07:43)  HR: 72 (18 Jul 2017 07:43) (70 - 72)  BP: 131/83 (18 Jul 2017 07:43) (124/77 - 138/78)  BP(mean): --  RR: 18 (18 Jul 2017 07:43) (16 - 18)  SpO2: 98% (18 Jul 2017 07:43) (97% - 98%)        PHYSICAL EXAM  General: adult in NAD  HEENT: clear oropharynx, anicteric sclera, pink conjunctiva, no mucositis  Neck: supple  CV: normal S1/S2 with no murmur rubs or gallops  Lungs: positive air movement b/l ant lungs,clear to auscultation, no wheezes, no rales  Abdomen: soft non-tender non-distended, no hepatosplenomegaly  Ext: no clubbing cyanosis, 1+ b/l p. edema  Skin: no rashes and no petechiae  Neuro: alert and oriented X 4, no focal deficits    Allergies    No Known Allergies    Intolerances      MEDICATIONS  (STANDING):  dextrose 5% + sodium chloride 0.45%. 1000 milliLiter(s) (50 mL/Hr) IV Continuous <Continuous>  allopurinol 300 milliGRAM(s) Oral daily  heparin  Injectable 5000 Unit(s) SubCutaneous every 12 hours  riTUXimab IVPB 618.75 milliGRAM(s) IV Intermittent once  ondansetron  IVPB 16 milliGRAM(s) IV Intermittent daily  etoposide IVPB 82.5 milliGRAM(s) IV Intermittent daily  DOXOrubicin IVPB w/vinCRIStine 16.5 milliGRAM(s) IV Intermittent daily  famotidine  IVPB 20 milliGRAM(s) IV Intermittent daily  predniSONE   Tablet 50 milliGRAM(s) Oral two times a day  trimethoprim  160 mG/sulfamethoxazole 800 mG 1 Tablet(s) Oral <User Schedule>  acyclovir   Tablet 400 milliGRAM(s) Oral <User Schedule>  cyclophosphamide IVPB 825 milliGRAM(s) IV Intermittent once    MEDICATIONS  (PRN):  acetaminophen   Tablet 650 milliGRAM(s) Oral every 6 hours PRN For Temp greater than 38.5 C (101.3 F)  acetaminophen   Tablet. 650 milliGRAM(s) Oral every 6 hours PRN Mild Pain (1 - 3)  ondansetron    Tablet 4 milliGRAM(s) Oral every 8 hours PRN Nausea and/or Vomiting Patient admitted for R-EPOCH chemo # 3 for DLBCL day #5    Pt is seen and examined  pt is awake and lying in bed  pt seems comfortable   Tolerating therapy well  chronic numbness in jaw area  Intermittent tingling in extremities  No diarrhea, constipation  Good po intake  c/o mild lip swelling with prednoisone - transient    Allergies    No Known Allergies    Intolerances            Vital Signs Last 24 Hrs  T(C): 36.6 (18 Jul 2017 07:43), Max: 36.6 (18 Jul 2017 07:43)  T(F): 97.9 (18 Jul 2017 07:43), Max: 97.9 (18 Jul 2017 07:43)  HR: 72 (18 Jul 2017 07:43) (70 - 72)  BP: 131/83 (18 Jul 2017 07:43) (124/77 - 138/78)  BP(mean): --  RR: 18 (18 Jul 2017 07:43) (16 - 18)  SpO2: 98% (18 Jul 2017 07:43) (97% - 98%)        PHYSICAL EXAM  General: adult in NAD  HEENT: clear oropharynx, anicteric sclera, pink conjunctiva, no mucositis  Neck: supple  CV: normal S1/S2 with no murmur rubs or gallops  Lungs: positive air movement b/l ant lungs,clear to auscultation, no wheezes, no rales  Abdomen: soft non-tender non-distended, no hepatosplenomegaly  Ext: no clubbing cyanosis, 1+ b/l p. edema  Skin: no rashes and no petechiae  Neuro: alert and oriented X 4, no focal deficits    Allergies    No Known Allergies    Intolerances      MEDICATIONS  (STANDING):  dextrose 5% + sodium chloride 0.45%. 1000 milliLiter(s) (50 mL/Hr) IV Continuous <Continuous>  allopurinol 300 milliGRAM(s) Oral daily  heparin  Injectable 5000 Unit(s) SubCutaneous every 12 hours  riTUXimab IVPB 618.75 milliGRAM(s) IV Intermittent once  ondansetron  IVPB 16 milliGRAM(s) IV Intermittent daily  etoposide IVPB 82.5 milliGRAM(s) IV Intermittent daily  DOXOrubicin IVPB w/vinCRIStine 16.5 milliGRAM(s) IV Intermittent daily  famotidine  IVPB 20 milliGRAM(s) IV Intermittent daily  predniSONE   Tablet 50 milliGRAM(s) Oral two times a day  trimethoprim  160 mG/sulfamethoxazole 800 mG 1 Tablet(s) Oral <User Schedule>  acyclovir   Tablet 400 milliGRAM(s) Oral <User Schedule>  cyclophosphamide IVPB 825 milliGRAM(s) IV Intermittent once    MEDICATIONS  (PRN):  acetaminophen   Tablet 650 milliGRAM(s) Oral every 6 hours PRN For Temp greater than 38.5 C (101.3 F)  acetaminophen   Tablet. 650 milliGRAM(s) Oral every 6 hours PRN Mild Pain (1 - 3)  ondansetron    Tablet 4 milliGRAM(s) Oral every 8 hours PRN Nausea and/or Vomiting    LABS:                          9.1    4.1   )-----------( 209      ( 18 Jul 2017 07:16 )             27.3         Mean Cell Volume : 98.0 fl  Mean Cell Hemoglobin : 32.7 pg  Mean Cell Hemoglobin Concentration : 33.3 gm/dL        07-18    142  |  104  |  20  ----------------------------<  112<H>  4.0   |  26  |  0.66    Ca    9.5      18 Jul 2017 07:16    TPro  5.1<L>  /  Alb  3.5  /  TBili  0.3  /  DBili  x   /  AST  18  /  ALT  16  /  AlkPhos  44  07-18    Lactate Dehydrogenase, Serum: 169 U/L (07-18 @ 07:16)      Uric Acid, Serum (07.18.17 @ 07:16)    Uric Acid, Serum: 2.3 mg/dL

## 2017-07-18 NOTE — ADVANCED PRACTICE NURSE CONSULT - REASON FOR CONSULT
Chemotherapy Note
Chemotherapy Note/ late entry
chemotheraphy note
chemotherapy nurses note
Chemotherapy note

## 2017-07-18 NOTE — DISCHARGE NOTE ADULT - HOSPITAL COURSE
xxxx 76 female with B cell lymphoma for chemoRx.  ChemoRx given without complications. Medically stable. DC home.  Dg B cell lymphoma

## 2017-07-18 NOTE — PROGRESS NOTE ADULT - ASSESSMENT
76 f with b cell lymphoma for chemoRx  IV hydration during chemoRx  prophylaxis with Bactrim/Valtrex  follow cbc, electrolytes  oncology follow  feet edema is mild- needs hydration during chemoRx. Keep legs elevated. Avoid diuresis.  discussed with patient/ QA  DC home after chemo. Follow with Oncology in 2-3 days.  Sea Wolff MD pager [phone]

## 2017-07-18 NOTE — DISCHARGE NOTE ADULT - MEDICATION SUMMARY - MEDICATIONS TO TAKE
I will START or STAY ON the medications listed below when I get home from the hospital:    allopurinol 300 mg oral tablet  -- 1 tab(s) by mouth once a day  -- Indication: For ppx    acyclovir 400 mg oral tablet  -- 1 tab(s) by mouth   -- Indication: For ppx    sulfamethoxazole-trimethoprim 800 mg-160 mg oral tablet  -- 1 tab(s) by mouth   -- Indication: For ppx I will START or STAY ON the medications listed below when I get home from the hospital:    allopurinol 300 mg oral tablet  -- 1 tab(s) by mouth once a day  -- Indication: For ppx    acyclovir 400 mg oral tablet  -- 1 tab(s) by mouth   -- Indication: For Take twice daily Tuesday/Thursday/Saturday; ppx    sulfamethoxazole-trimethoprim 800 mg-160 mg oral tablet  -- 1 tab(s) by mouth     -- Indication: For Take daily Monday/Wednesday/Friday, PPx

## 2017-08-16 ENCOUNTER — INPATIENT (INPATIENT)
Facility: HOSPITAL | Age: 77
LOS: 3 days | Discharge: HOME | DRG: 847 | End: 2017-08-20
Attending: INTERNAL MEDICINE | Admitting: INTERNAL MEDICINE
Payer: MEDICARE

## 2017-08-16 VITALS
SYSTOLIC BLOOD PRESSURE: 113 MMHG | RESPIRATION RATE: 18 BRPM | WEIGHT: 124.12 LBS | DIASTOLIC BLOOD PRESSURE: 72 MMHG | TEMPERATURE: 98 F | HEART RATE: 85 BPM | HEIGHT: 66 IN | OXYGEN SATURATION: 97 %

## 2017-08-16 DIAGNOSIS — Z98.890 OTHER SPECIFIED POSTPROCEDURAL STATES: Chronic | ICD-10-CM

## 2017-08-16 DIAGNOSIS — C85.99 NON-HODGKIN LYMPHOMA, UNSPECIFIED, EXTRANODAL AND SOLID ORGAN SITES: ICD-10-CM

## 2017-08-16 DIAGNOSIS — D27.9 BENIGN NEOPLASM OF UNSPECIFIED OVARY: Chronic | ICD-10-CM

## 2017-08-16 DIAGNOSIS — C83.3 DIFFUSE LARGE B-CELL LYMPHOMA: ICD-10-CM

## 2017-08-16 DIAGNOSIS — Z86.018 PERSONAL HISTORY OF OTHER BENIGN NEOPLASM: Chronic | ICD-10-CM

## 2017-08-16 LAB
ALBUMIN SERPL ELPH-MCNC: 4.3 G/DL — SIGNIFICANT CHANGE UP (ref 3.3–5)
ALP SERPL-CCNC: 56 U/L — SIGNIFICANT CHANGE UP (ref 40–120)
ALT FLD-CCNC: 23 U/L RC — SIGNIFICANT CHANGE UP (ref 10–45)
ANION GAP SERPL CALC-SCNC: 12 MMOL/L — SIGNIFICANT CHANGE UP (ref 5–17)
AST SERPL-CCNC: 22 U/L — SIGNIFICANT CHANGE UP (ref 10–40)
BILIRUB SERPL-MCNC: 0.2 MG/DL — SIGNIFICANT CHANGE UP (ref 0.2–1.2)
BUN SERPL-MCNC: 23 MG/DL — SIGNIFICANT CHANGE UP (ref 7–23)
CALCIUM SERPL-MCNC: 9.1 MG/DL — SIGNIFICANT CHANGE UP (ref 8.4–10.5)
CHLORIDE SERPL-SCNC: 107 MMOL/L — SIGNIFICANT CHANGE UP (ref 96–108)
CO2 SERPL-SCNC: 23 MMOL/L — SIGNIFICANT CHANGE UP (ref 22–31)
CREAT SERPL-MCNC: 0.82 MG/DL — SIGNIFICANT CHANGE UP (ref 0.5–1.3)
GLUCOSE SERPL-MCNC: 103 MG/DL — HIGH (ref 70–99)
HCT VFR BLD CALC: 31.7 % — LOW (ref 34.5–45)
HGB BLD-MCNC: 10.7 G/DL — LOW (ref 11.5–15.5)
LDH SERPL L TO P-CCNC: 205 U/L — SIGNIFICANT CHANGE UP (ref 50–242)
MCHC RBC-ENTMCNC: 33.6 GM/DL — SIGNIFICANT CHANGE UP (ref 32–36)
MCHC RBC-ENTMCNC: 34.8 PG — HIGH (ref 27–34)
MCV RBC AUTO: 104 FL — HIGH (ref 80–100)
PLATELET # BLD AUTO: 200 K/UL — SIGNIFICANT CHANGE UP (ref 150–400)
POTASSIUM SERPL-MCNC: 4.3 MMOL/L — SIGNIFICANT CHANGE UP (ref 3.5–5.3)
POTASSIUM SERPL-SCNC: 4.3 MMOL/L — SIGNIFICANT CHANGE UP (ref 3.5–5.3)
PROT SERPL-MCNC: 6.2 G/DL — SIGNIFICANT CHANGE UP (ref 6–8.3)
RBC # BLD: 3.06 M/UL — LOW (ref 3.8–5.2)
RBC # FLD: 16.9 % — HIGH (ref 10.3–14.5)
SODIUM SERPL-SCNC: 142 MMOL/L — SIGNIFICANT CHANGE UP (ref 135–145)
URATE SERPL-MCNC: 2 MG/DL — LOW (ref 2.5–7)
WBC # BLD: 5 K/UL — SIGNIFICANT CHANGE UP (ref 3.8–10.5)
WBC # FLD AUTO: 5 K/UL — SIGNIFICANT CHANGE UP (ref 3.8–10.5)

## 2017-08-16 RX ORDER — ETOPOSIDE 20 MG/ML
82 VIAL (ML) INTRAVENOUS DAILY
Qty: 0 | Refills: 0 | Status: DISCONTINUED | OUTPATIENT
Start: 2017-08-16 | End: 2017-08-20

## 2017-08-16 RX ORDER — HEPARIN SODIUM 5000 [USP'U]/ML
5000 INJECTION INTRAVENOUS; SUBCUTANEOUS EVERY 12 HOURS
Qty: 0 | Refills: 0 | Status: DISCONTINUED | OUTPATIENT
Start: 2017-08-16 | End: 2017-08-20

## 2017-08-16 RX ORDER — RITUXIMAB 10 MG/ML
611 INJECTION, SOLUTION INTRAVENOUS ONCE
Qty: 0 | Refills: 0 | Status: COMPLETED | OUTPATIENT
Start: 2017-08-16 | End: 2017-08-16

## 2017-08-16 RX ORDER — ONDANSETRON 8 MG/1
16 TABLET, FILM COATED ORAL DAILY
Qty: 0 | Refills: 0 | Status: COMPLETED | OUTPATIENT
Start: 2017-08-16 | End: 2017-08-20

## 2017-08-16 RX ORDER — ALLOPURINOL 300 MG
300 TABLET ORAL DAILY
Qty: 0 | Refills: 0 | Status: DISCONTINUED | OUTPATIENT
Start: 2017-08-16 | End: 2017-08-20

## 2017-08-16 RX ORDER — ACYCLOVIR SODIUM 500 MG
400 VIAL (EA) INTRAVENOUS
Qty: 0 | Refills: 0 | Status: DISCONTINUED | OUTPATIENT
Start: 2017-08-16 | End: 2017-08-17

## 2017-08-16 RX ORDER — DOXORUBICIN HYDROCHLORIDE 2 MG/ML
16 INJECTION, SOLUTION INTRAVENOUS DAILY
Qty: 0 | Refills: 0 | Status: DISCONTINUED | OUTPATIENT
Start: 2017-08-16 | End: 2017-08-20

## 2017-08-16 RX ORDER — ACETAMINOPHEN 500 MG
650 TABLET ORAL ONCE
Qty: 0 | Refills: 0 | Status: COMPLETED | OUTPATIENT
Start: 2017-08-16 | End: 2017-08-16

## 2017-08-16 RX ORDER — SODIUM CHLORIDE 9 MG/ML
1000 INJECTION, SOLUTION INTRAVENOUS
Qty: 0 | Refills: 0 | Status: DISCONTINUED | OUTPATIENT
Start: 2017-08-16 | End: 2017-08-20

## 2017-08-16 RX ORDER — FAMOTIDINE 10 MG/ML
20 INJECTION INTRAVENOUS DAILY
Qty: 0 | Refills: 0 | Status: COMPLETED | OUTPATIENT
Start: 2017-08-16 | End: 2017-08-20

## 2017-08-16 RX ORDER — DIPHENHYDRAMINE HCL 50 MG
25 CAPSULE ORAL ONCE
Qty: 0 | Refills: 0 | Status: COMPLETED | OUTPATIENT
Start: 2017-08-16 | End: 2017-08-16

## 2017-08-16 RX ORDER — FOSAPREPITANT DIMEGLUMINE 150 MG/5ML
150 INJECTION, POWDER, LYOPHILIZED, FOR SOLUTION INTRAVENOUS ONCE
Qty: 0 | Refills: 0 | Status: COMPLETED | OUTPATIENT
Start: 2017-08-16 | End: 2017-08-16

## 2017-08-16 RX ADMIN — FOSAPREPITANT DIMEGLUMINE 300 MILLIGRAM(S): 150 INJECTION, POWDER, LYOPHILIZED, FOR SOLUTION INTRAVENOUS at 16:30

## 2017-08-16 RX ADMIN — Medication 25 MILLIGRAM(S): at 13:00

## 2017-08-16 RX ADMIN — Medication 50 MILLIGRAM(S): at 19:10

## 2017-08-16 RX ADMIN — HEPARIN SODIUM 5000 UNIT(S): 5000 INJECTION INTRAVENOUS; SUBCUTANEOUS at 19:11

## 2017-08-16 RX ADMIN — ONDANSETRON 116 MILLIGRAM(S): 8 TABLET, FILM COATED ORAL at 17:22

## 2017-08-16 RX ADMIN — FAMOTIDINE 100 MILLIGRAM(S): 10 INJECTION INTRAVENOUS at 17:00

## 2017-08-16 RX ADMIN — Medication 650 MILLIGRAM(S): at 13:00

## 2017-08-16 RX ADMIN — Medication 1 TABLET(S): at 23:25

## 2017-08-16 NOTE — H&P ADULT - NSHPLABSRESULTS_GEN_ALL_CORE
10.7   5.0   )-----------( 200      ( 16 Aug 2017 10:47 )             31.7       08-16    142  |  107  |  23  ----------------------------<  103<H>  4.3   |  23  |  0.82    Ca    9.1      16 Aug 2017 10:47    TPro  6.2  /  Alb  4.3  /  TBili  0.2  /  DBili  x   /  AST  22  /  ALT  23  /  AlkPhos  56  08-16                      Lactate Trend            CAPILLARY BLOOD GLUCOSE

## 2017-08-16 NOTE — PROGRESS NOTE ADULT - ASSESSMENT
S/p 3 cycle  chemotherapy for Non-Hodgkin's lymphoma. Doing well. Admitted for 4th cycle.  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   Orders reviewed - OK to proceed with chemotherapy  Rituxan, Etoposide, prednisone, Oncovin, Cytoxan, (hyroxy)Adriamcin (R-EPOCH)  Monitor CBC, CMP LDH Uric acid  IV hydration

## 2017-08-16 NOTE — H&P ADULT - ASSESSMENT
76 f for chemoRx for Lymphoma.  IV hydration  oncology evaluation Dr. Garcia  follow cbc, lytes  prophylaxis  Further action as per clinical course  Sea Wolff MD pager [phone]

## 2017-08-16 NOTE — ADVANCED PRACTICE NURSE CONSULT - ASSESSMENT
Cycle 4, day 1/5,Height and weight verified. Lab results as per Md gurpreet aware of same. Vital signs stable prior to chemotherapy and  within acceptable parameters, see sunrise. Pt educated on the importance of saving urine, verbalizes good understanding. Pt education done re chemo regimen, drug effects and potential side effects, refuse written materials. Reports that she has been tolerating chemotherapy well without side effects. Pt with right chest wall Mediport , site free from signs and symptoms of infection, good blood return obtained. Pre- medicated with Tylenol 650mg PO and Benadryl 25mg IVP. Rituxan 375mg/m2= 611mg IVPB, administer at 100mg/hr, increase by 100mg/hr every 30 minutes if patient tolerate same to a maximum rate of 400mg/hr until complete. Patient then pre-medicated with Emend 150mg IVPB, Zofran 16mg IVPB and  Pepcid 20mg IVPB. Etoposide 50mg/m2= 82mg IVPB continuos infusion day 1/4, Vincristine 0.4mg/m2=0.65mg IVPB day1/4 mixed with  Doxorubicin 10mg/m2=16mg IVPB continuos infusion day 1/4, same initiated at Cycle 4, day 1/5,Height and weight verified. Lab results as per Md gurpreet aware of same. Vital signs stable prior to chemotherapy and  within acceptable parameters, see sunrise. Pt educated on the importance of saving urine, verbalizes good understanding. Pt education done re chemo regimen, drug effects and potential side effects, refuse written materials. Reports that she has been tolerating chemotherapy well without side effects. Pt with right chest wall Mediport , site free from signs and symptoms of infection, good blood return obtained. Pre- medicated with Tylenol 650mg PO and Benadryl 25mg IVP. Rituxan 375mg/m2= 611mg IVPB, administer at 100mg/hr, increase by 100mg/hr every 30 minutes if patient tolerate same to a maximum rate of 400mg/hr until complete. Patient then pre-medicated with Emend 150mg IVPB, Zofran 16mg IVPB and  Pepcid 20mg IVPB. Etoposide 50mg/m2= 82mg IVPB continuos infusion day 1/4 at the rate of 22ml/hr, Vincristine 0.4mg/m2=0.65mg IVPB day1/4 mixed with  Doxorubicin 10mg/m2=16mg IVPB continuos infusion day 1/4 at 22ml/hr, same initiated at at 1715 via locked pump. Patient tolerating same well without immediate side effects noted.

## 2017-08-16 NOTE — PROGRESS NOTE ADULT - SUBJECTIVE AND OBJECTIVE BOX
Admitted electively for 4th cycle R-EPOCH for high grade Non-Hodgkin's lymphoma, Having a good response. Tolerated the first 3 cycles well.  Complains of mild  numbness toes and fingers.  No other significant medical problems.     Pt is seen and examined  pt is awake and lying in bed/out of bed to chair  pt seems comfortable and denies any complaints at this time        MEDICATIONS  (STANDING):    MEDICATIONS  (PRN):      Allergies    No Known Allergies    Intolerances        Vital Signs Last 24 Hrs  T(C): 36.8 (16 Aug 2017 10:46), Max: 36.8 (16 Aug 2017 10:46)  T(F): 98.3 (16 Aug 2017 10:46), Max: 98.3 (16 Aug 2017 10:46)  HR: 85 (16 Aug 2017 10:46) (85 - 85)  BP: 113/72 (16 Aug 2017 10:46) (113/72 - 113/72)  BP(mean): --  RR: 18 (16 Aug 2017 10:46) (18 - 18)  SpO2: 97% (16 Aug 2017 10:46) (97% - 97%)    PHYSICAL EXAM  General: adult in NAD  HEENT: clear oropharynx, anicteric sclera, pink conjunctiva  Neck: supple  CV: normal S1/S2 with no murmur rubs or gallops  Lungs: positive air movement b/l ant lungs,clear to auscultation, no wheezes, no rales  Abdomen: soft non-tender non-distended, no hepatosplenomegaly  Ext: no clubbing cyanosis or edema  Skin: no rashes and no petechiae  Neuro: alert and oriented X 4, no focal deficits  LABS:                          10.7   5.0   )-----------( 200      ( 16 Aug 2017 10:47 )             31.7         Mean Cell Volume : 104.0 fl  Mean Cell Hemoglobin : 34.8 pg  Mean Cell Hemoglobin Concentration : 33.6 gm/dL  Auto Neutrophil # : x  Auto Lymphocyte # : x  Auto Monocyte # : x  Auto Eosinophil # : x  Auto Basophil # : x  Auto Neutrophil % : x  Auto Lymphocyte % : x  Auto Monocyte % : x  Auto Eosinophil % : x  Auto Basophil % : x      08-16    142  |  107  |  23  ----------------------------<  103<H>  4.3   |  23  |  0.82    Ca    9.1      16 Aug 2017 10:47    TPro  6.2  /  Alb  4.3  /  TBili  0.2  /  DBili  x   /  AST  22  /  ALT  23  /  AlkPhos  56  08-16          Lactate Dehydrogenase, Serum: 205 U/L (08-16 @ 10:47)

## 2017-08-17 LAB
ALBUMIN SERPL ELPH-MCNC: 4.2 G/DL — SIGNIFICANT CHANGE UP (ref 3.3–5)
ALP SERPL-CCNC: 55 U/L — SIGNIFICANT CHANGE UP (ref 40–120)
ALT FLD-CCNC: 20 U/L RC — SIGNIFICANT CHANGE UP (ref 10–45)
ANION GAP SERPL CALC-SCNC: 13 MMOL/L — SIGNIFICANT CHANGE UP (ref 5–17)
AST SERPL-CCNC: 20 U/L — SIGNIFICANT CHANGE UP (ref 10–40)
BILIRUB SERPL-MCNC: 0.3 MG/DL — SIGNIFICANT CHANGE UP (ref 0.2–1.2)
BUN SERPL-MCNC: 21 MG/DL — SIGNIFICANT CHANGE UP (ref 7–23)
CALCIUM SERPL-MCNC: 9.2 MG/DL — SIGNIFICANT CHANGE UP (ref 8.4–10.5)
CHLORIDE SERPL-SCNC: 106 MMOL/L — SIGNIFICANT CHANGE UP (ref 96–108)
CO2 SERPL-SCNC: 22 MMOL/L — SIGNIFICANT CHANGE UP (ref 22–31)
CREAT SERPL-MCNC: 0.82 MG/DL — SIGNIFICANT CHANGE UP (ref 0.5–1.3)
GLUCOSE SERPL-MCNC: 156 MG/DL — HIGH (ref 70–99)
HCT VFR BLD CALC: 33.7 % — LOW (ref 34.5–45)
HGB BLD-MCNC: 11.2 G/DL — LOW (ref 11.5–15.5)
LDH SERPL L TO P-CCNC: 203 U/L — SIGNIFICANT CHANGE UP (ref 50–242)
MCHC RBC-ENTMCNC: 33.3 GM/DL — SIGNIFICANT CHANGE UP (ref 32–36)
MCHC RBC-ENTMCNC: 34.6 PG — HIGH (ref 27–34)
MCV RBC AUTO: 104 FL — HIGH (ref 80–100)
PLATELET # BLD AUTO: 207 K/UL — SIGNIFICANT CHANGE UP (ref 150–400)
POTASSIUM SERPL-MCNC: 4.4 MMOL/L — SIGNIFICANT CHANGE UP (ref 3.5–5.3)
POTASSIUM SERPL-SCNC: 4.4 MMOL/L — SIGNIFICANT CHANGE UP (ref 3.5–5.3)
PROT SERPL-MCNC: 6 G/DL — SIGNIFICANT CHANGE UP (ref 6–8.3)
RBC # BLD: 3.24 M/UL — LOW (ref 3.8–5.2)
RBC # FLD: 16.7 % — HIGH (ref 10.3–14.5)
SODIUM SERPL-SCNC: 141 MMOL/L — SIGNIFICANT CHANGE UP (ref 135–145)
URATE SERPL-MCNC: 2 MG/DL — LOW (ref 2.5–7)
WBC # BLD: 3.4 K/UL — LOW (ref 3.8–10.5)
WBC # FLD AUTO: 3.4 K/UL — LOW (ref 3.8–10.5)

## 2017-08-17 RX ORDER — ACYCLOVIR SODIUM 500 MG
400 VIAL (EA) INTRAVENOUS
Qty: 0 | Refills: 0 | Status: DISCONTINUED | OUTPATIENT
Start: 2017-08-17 | End: 2017-08-20

## 2017-08-17 RX ADMIN — HEPARIN SODIUM 5000 UNIT(S): 5000 INJECTION INTRAVENOUS; SUBCUTANEOUS at 18:09

## 2017-08-17 RX ADMIN — Medication 400 MILLIGRAM(S): at 08:11

## 2017-08-17 RX ADMIN — Medication 300 MILLIGRAM(S): at 11:58

## 2017-08-17 RX ADMIN — Medication 50 MILLIGRAM(S): at 18:37

## 2017-08-17 RX ADMIN — Medication 50 MILLIGRAM(S): at 06:14

## 2017-08-17 RX ADMIN — Medication 400 MILLIGRAM(S): at 20:18

## 2017-08-17 RX ADMIN — FAMOTIDINE 100 MILLIGRAM(S): 10 INJECTION INTRAVENOUS at 14:42

## 2017-08-17 RX ADMIN — ONDANSETRON 116 MILLIGRAM(S): 8 TABLET, FILM COATED ORAL at 15:12

## 2017-08-17 NOTE — PROGRESS NOTE ADULT - SUBJECTIVE AND OBJECTIVE BOX
Pt is seen and examined  pt is awake and out of bed to chair  pt seems comfortable and denies any complaints at this time  Tolerating chemotherapy  well.        MEDICATIONS  (STANDING):  dextrose 5% + sodium chloride 0.45%. 1000 milliLiter(s) (50 mL/Hr) IV Continuous <Continuous>  ondansetron  IVPB 16 milliGRAM(s) IV Intermittent daily  famotidine  IVPB 20 milliGRAM(s) IV Intermittent daily  predniSONE   Tablet 50 milliGRAM(s) Oral two times a day  etoposide IVPB 82 milliGRAM(s) IV Intermittent daily  DOXOrubicin IVPB w/vinCRIStine 16 milliGRAM(s) IV Intermittent daily  heparin  Injectable 5000 Unit(s) SubCutaneous every 12 hours  allopurinol 300 milliGRAM(s) Oral daily  acyclovir   Tablet 400 milliGRAM(s) Oral <User Schedule>  trimethoprim  160 mG/sulfamethoxazole 800 mG 1 Tablet(s) Oral <User Schedule>    MEDICATIONS  (PRN):      Allergies    No Known Allergies    Intolerances        Vital Signs Last 24 Hrs  T(C): 36.4 (17 Aug 2017 08:06), Max: 36.9 (16 Aug 2017 15:00)  T(F): 97.5 (17 Aug 2017 08:06), Max: 98.5 (16 Aug 2017 15:00)  HR: 70 (17 Aug 2017 08:06) (69 - 85)  BP: 113/75 (17 Aug 2017 08:06) (99/64 - 121/72)  BP(mean): --  RR: 18 (17 Aug 2017 08:06) (16 - 18)  SpO2: 98% (17 Aug 2017 08:06) (95% - 98%)    PHYSICAL EXAM  General: adult in NAD  HEENT: clear oropharynx, anicteric sclera, pink conjunctiva  Neck: supple  CV: normal S1/S2 with no murmur rubs or gallops  Lungs: positive air movement b/l ant lungs,clear to auscultation, no wheezes, no rales  Abdomen: soft non-tender non-distended, no hepatosplenomegaly  Ext: no clubbing cyanosis or edema  Skin: no rashes and no petechiae  Neuro: alert and oriented X 4, no focal deficits  LABS:                          11.2   3.4   )-----------( 207      ( 17 Aug 2017 06:57 )             33.7         Mean Cell Volume : 104.0 fl  Mean Cell Hemoglobin : 34.6 pg  Mean Cell Hemoglobin Concentration : 33.3 gm/dL  Auto Neutrophil # : x  Auto Lymphocyte # : x  Auto Monocyte # : x  Auto Eosinophil # : x  Auto Basophil # : x  Auto Neutrophil % : x  Auto Lymphocyte % : x  Auto Monocyte % : x  Auto Eosinophil % : x  Auto Basophil % : x      08-17    141  |  106  |  21  ----------------------------<  156<H>  4.4   |  22  |  0.82    Ca    9.2      17 Aug 2017 06:57    TPro  6.0  /  Alb  4.2  /  TBili  0.3  /  DBili  x   /  AST  20  /  ALT  20  /  AlkPhos  55  08-17          Lactate Dehydrogenase, Serum: 203 U/L (08-17 @ 06:57)  Lactate Dehydrogenase, Serum: 205 U/L (08-16 @ 10:47)

## 2017-08-17 NOTE — PROGRESS NOTE ADULT - ASSESSMENT
S/p 3 cycle  chemotherapy for Non-Hodgkin's lymphoma. Doing well. Admitted for 4th cycle.  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   Rituxan, Etoposide, prednisone, Oncovin, Cytoxan, (hyroxy)Adriamcin (R-EPOCH)  Monitor CBC, CMP LDH Uric acid  IV hydration  Tolerating chemotherapy well   Continue chemotherapy today - adriamycin/etoposide/vincristine

## 2017-08-17 NOTE — PROGRESS NOTE ADULT - ASSESSMENT
76 f - chemoRx for Lymphoma in progress.  IV hydration  oncology evaluation Dr. Garcia noted  follow cbc, lytes  prophylaxis  Sea Wolff MD pager [phone]

## 2017-08-17 NOTE — ADVANCED PRACTICE NURSE CONSULT - ASSESSMENT
Pt with day 2/3 tx, labs noted in sunrise, height, weight and bsa verified.  Pt with chest wall mediport, + blood return noted, no pain, redness or swelling noted at site.  Pt premedicated with zofran 16 mg iv x 1 and pepcid 20 mg iv x 1 and pt on prednisone 50 mg po  2x a day.  Pt administered etoposide 50 mg/m2 = 82 mg day 2/4 at 22 ml/hr civi via locked pump.  Pt administered vincristine 0.4 mg/m2 = 0.65 mg day 2/4 at 22 ml/hr civi and doxorubicin 10 mg/m2 = 16 mg day 2/4 at 22 ml/hr via locked pump.  Reinforced with pt chemo regimen and signs and symptoms to report on to area nurse and staff, pt verbalized understanding.  Emotional support provided.  Report given to area nurse. Pt with day 2/5 tx, labs noted in sunrise, height, weight and bsa verified.  Pt with chest wall mediport, + blood return noted, no pain, redness or swelling noted at site.  Pt premedicated with zofran 16 mg iv x 1 and pepcid 20 mg iv x 1 and pt on prednisone 50 mg po  2x a day.  Pt administered etoposide 50 mg/m2 = 82 mg day 2/5 at 22 ml/hr civi via locked pump.  Pt administered vincristine 0.4 mg/m2 = 0.65 mg day 2/5 at 22 ml/hr civi and doxorubicin 10 mg/m2 = 16 mg day 2/5 at 22 ml/hr via locked pump.  Reinforced with pt chemo regimen and signs and symptoms to report on to area nurse and staff, pt verbalized understanding.  Emotional support provided.  Report given to area nurse.

## 2017-08-18 LAB
ALBUMIN SERPL ELPH-MCNC: 3.9 G/DL — SIGNIFICANT CHANGE UP (ref 3.3–5)
ALP SERPL-CCNC: 48 U/L — SIGNIFICANT CHANGE UP (ref 40–120)
ALT FLD-CCNC: 13 U/L RC — SIGNIFICANT CHANGE UP (ref 10–45)
ANION GAP SERPL CALC-SCNC: 11 MMOL/L — SIGNIFICANT CHANGE UP (ref 5–17)
AST SERPL-CCNC: 16 U/L — SIGNIFICANT CHANGE UP (ref 10–40)
BASOPHILS # BLD AUTO: 0 K/UL — SIGNIFICANT CHANGE UP (ref 0–0.2)
BASOPHILS NFR BLD AUTO: 0 % — SIGNIFICANT CHANGE UP (ref 0–2)
BILIRUB SERPL-MCNC: 0.2 MG/DL — SIGNIFICANT CHANGE UP (ref 0.2–1.2)
BUN SERPL-MCNC: 19 MG/DL — SIGNIFICANT CHANGE UP (ref 7–23)
CALCIUM SERPL-MCNC: 8.9 MG/DL — SIGNIFICANT CHANGE UP (ref 8.4–10.5)
CHLORIDE SERPL-SCNC: 107 MMOL/L — SIGNIFICANT CHANGE UP (ref 96–108)
CO2 SERPL-SCNC: 23 MMOL/L — SIGNIFICANT CHANGE UP (ref 22–31)
CREAT SERPL-MCNC: 0.81 MG/DL — SIGNIFICANT CHANGE UP (ref 0.5–1.3)
EOSINOPHIL # BLD AUTO: 0 K/UL — SIGNIFICANT CHANGE UP (ref 0–0.5)
EOSINOPHIL NFR BLD AUTO: 0.1 % — SIGNIFICANT CHANGE UP (ref 0–6)
GLUCOSE SERPL-MCNC: 157 MG/DL — HIGH (ref 70–99)
HCT VFR BLD CALC: 29.1 % — LOW (ref 34.5–45)
HGB BLD-MCNC: 10.1 G/DL — LOW (ref 11.5–15.5)
LDH SERPL L TO P-CCNC: 167 U/L — SIGNIFICANT CHANGE UP (ref 50–242)
LYMPHOCYTES # BLD AUTO: 0.3 K/UL — LOW (ref 1–3.3)
LYMPHOCYTES # BLD AUTO: 6 % — LOW (ref 13–44)
MCHC RBC-ENTMCNC: 34.6 GM/DL — SIGNIFICANT CHANGE UP (ref 32–36)
MCHC RBC-ENTMCNC: 35.9 PG — HIGH (ref 27–34)
MCV RBC AUTO: 104 FL — HIGH (ref 80–100)
MONOCYTES # BLD AUTO: 0.1 K/UL — SIGNIFICANT CHANGE UP (ref 0–0.9)
MONOCYTES NFR BLD AUTO: 2 % — SIGNIFICANT CHANGE UP (ref 2–14)
NEUTROPHILS # BLD AUTO: 4.8 K/UL — SIGNIFICANT CHANGE UP (ref 1.8–7.4)
NEUTROPHILS NFR BLD AUTO: 91.9 % — HIGH (ref 43–77)
PLATELET # BLD AUTO: 170 K/UL — SIGNIFICANT CHANGE UP (ref 150–400)
POTASSIUM SERPL-MCNC: 4.2 MMOL/L — SIGNIFICANT CHANGE UP (ref 3.5–5.3)
POTASSIUM SERPL-SCNC: 4.2 MMOL/L — SIGNIFICANT CHANGE UP (ref 3.5–5.3)
PROT SERPL-MCNC: 5.4 G/DL — LOW (ref 6–8.3)
RBC # BLD: 2.81 M/UL — LOW (ref 3.8–5.2)
RBC # FLD: 16.4 % — HIGH (ref 10.3–14.5)
SODIUM SERPL-SCNC: 141 MMOL/L — SIGNIFICANT CHANGE UP (ref 135–145)
URATE SERPL-MCNC: 2.1 MG/DL — LOW (ref 2.5–7)
WBC # BLD: 5.2 K/UL — SIGNIFICANT CHANGE UP (ref 3.8–10.5)
WBC # FLD AUTO: 5.2 K/UL — SIGNIFICANT CHANGE UP (ref 3.8–10.5)

## 2017-08-18 RX ADMIN — Medication 1 TABLET(S): at 21:06

## 2017-08-18 RX ADMIN — Medication 300 MILLIGRAM(S): at 11:39

## 2017-08-18 RX ADMIN — Medication 1 TABLET(S): at 08:27

## 2017-08-18 RX ADMIN — Medication 50 MILLIGRAM(S): at 09:37

## 2017-08-18 RX ADMIN — Medication 50 MILLIGRAM(S): at 17:41

## 2017-08-18 RX ADMIN — SODIUM CHLORIDE 50 MILLILITER(S): 9 INJECTION, SOLUTION INTRAVENOUS at 21:06

## 2017-08-18 RX ADMIN — FAMOTIDINE 100 MILLIGRAM(S): 10 INJECTION INTRAVENOUS at 11:39

## 2017-08-18 RX ADMIN — HEPARIN SODIUM 5000 UNIT(S): 5000 INJECTION INTRAVENOUS; SUBCUTANEOUS at 05:48

## 2017-08-18 RX ADMIN — ONDANSETRON 116 MILLIGRAM(S): 8 TABLET, FILM COATED ORAL at 14:03

## 2017-08-18 RX ADMIN — HEPARIN SODIUM 5000 UNIT(S): 5000 INJECTION INTRAVENOUS; SUBCUTANEOUS at 17:41

## 2017-08-18 NOTE — ADVANCED PRACTICE NURSE CONSULT - ASSESSMENT
day 1/4,Height and weight verified. Lab results as per Md gurpreet aware of same. Vital signs stable prior to chemotherapy, and  within accepectable parameters, see sunrise. Pt educated on the importance of saving urine, verbalizes good understanding. Pt education done re chemo regimen, drug effects and potential side effects,, pt states understanding. Reports that /she has been tolerating chemotherapy well without side effects. Pt withRCW mediport  line, site free from signs and symptoms of infection, good blood return obtained. Pre- medicated with zofran 16 mg ivss pepcid 20 mg ivss etoposide 50 mg/m2=82 mg civi @ 23 ml/hr doxorubicin 10 mg/m2 =16 mg civi with vincristine 0.4 mg/m2=0.65 mg civi @ 23 ml/hr

## 2017-08-18 NOTE — PROGRESS NOTE ADULT - SUBJECTIVE AND OBJECTIVE BOX
Pt is seen and examined  pt is awake and lying in bed/out of bed to chair  pt seems comfortable and denies any complaints at this time  Tolerating chemotherapy well        MEDICATIONS  (STANDING):  dextrose 5% + sodium chloride 0.45%. 1000 milliLiter(s) (50 mL/Hr) IV Continuous <Continuous>  ondansetron  IVPB 16 milliGRAM(s) IV Intermittent daily  famotidine  IVPB 20 milliGRAM(s) IV Intermittent daily  predniSONE   Tablet 50 milliGRAM(s) Oral two times a day  etoposide IVPB 82 milliGRAM(s) IV Intermittent daily  DOXOrubicin IVPB w/vinCRIStine 16 milliGRAM(s) IV Intermittent daily  heparin  Injectable 5000 Unit(s) SubCutaneous every 12 hours  allopurinol 300 milliGRAM(s) Oral daily  acyclovir   Tablet 400 milliGRAM(s) Oral <User Schedule>  trimethoprim  160 mG/sulfamethoxazole 800 mG 1 Tablet(s) Oral <User Schedule>    MEDICATIONS  (PRN):      Allergies    No Known Allergies    Intolerances        Vital Signs Last 24 Hrs  T(C): 36.4 (18 Aug 2017 07:24), Max: 36.8 (17 Aug 2017 15:53)  T(F): 97.6 (18 Aug 2017 07:24), Max: 98.3 (17 Aug 2017 15:53)  HR: 70 (18 Aug 2017 07:24) (69 - 71)  BP: 121/75 (18 Aug 2017 07:24) (109/63 - 121/75)  BP(mean): --  RR: 20 (18 Aug 2017 07:24) (18 - 20)  SpO2: 96% (18 Aug 2017 07:24) (96% - 98%)    PHYSICAL EXAM  General: adult in NAD  HEENT: clear oropharynx, anicteric sclera, pink conjunctiva  Neck: supple  CV: normal S1/S2 with no murmur rubs or gallops  Lungs: positive air movement b/l ant lungs,clear to auscultation, no wheezes, no rales  Abdomen: soft non-tender non-distended, no hepatosplenomegaly  Ext: no clubbing cyanosis or edema  Skin: no rashes and no petechiae  Neuro: alert and oriented X 4, no focal deficits  LABS:                          10.1   5.2   )-----------( 170      ( 18 Aug 2017 06:46 )             29.1         Mean Cell Volume : 104.0 fl  Mean Cell Hemoglobin : 35.9 pg  Mean Cell Hemoglobin Concentration : 34.6 gm/dL  Auto Neutrophil # : 4.8 K/uL  Auto Lymphocyte # : 0.3 K/uL  Auto Monocyte # : 0.1 K/uL  Auto Eosinophil # : 0.0 K/uL  Auto Basophil # : 0.0 K/uL  Auto Neutrophil % : 91.9 %  Auto Lymphocyte % : 6.0 %  Auto Monocyte % : 2.0 %  Auto Eosinophil % : 0.1 %  Auto Basophil % : 0.0 %      08-18    141  |  107  |  19  ----------------------------<  157<H>  4.2   |  23  |  0.81    Ca    8.9      18 Aug 2017 06:46    TPro  5.4<L>  /  Alb  3.9  /  TBili  0.2  /  DBili  x   /  AST  16  /  ALT  13  /  AlkPhos  48  08-18          Lactate Dehydrogenase, Serum: 167 U/L (08-18 @ 06:46)  Lactate Dehydrogenase, Serum: 203 U/L (08-17 @ 06:57)  Lactate Dehydrogenase, Serum: 205 U/L (08-16 @ 10:47)

## 2017-08-18 NOTE — PROGRESS NOTE ADULT - SUBJECTIVE AND OBJECTIVE BOX
Patient is a 76y old  Female who presents with a chief complaint of chemotherapy (16 Aug 2017 17:47)      SUBJECTIVE / OVERNIGHT EVENTS:    MEDICATIONS  (STANDING):  dextrose 5% + sodium chloride 0.45%. 1000 milliLiter(s) (50 mL/Hr) IV Continuous <Continuous>  ondansetron  IVPB 16 milliGRAM(s) IV Intermittent daily  famotidine  IVPB 20 milliGRAM(s) IV Intermittent daily  predniSONE   Tablet 50 milliGRAM(s) Oral two times a day  etoposide IVPB 82 milliGRAM(s) IV Intermittent daily  DOXOrubicin IVPB w/vinCRIStine 16 milliGRAM(s) IV Intermittent daily  heparin  Injectable 5000 Unit(s) SubCutaneous every 12 hours  allopurinol 300 milliGRAM(s) Oral daily  acyclovir   Tablet 400 milliGRAM(s) Oral <User Schedule>  trimethoprim  160 mG/sulfamethoxazole 800 mG 1 Tablet(s) Oral <User Schedule>    MEDICATIONS  (PRN):      Vital Signs Last 24 Hrs  T(F): 97.6 (08-18-17 @ 07:24), Max: 98.3 (08-17-17 @ 15:53)  HR: 70 (08-18-17 @ 07:24) (69 - 71)  BP: 121/75 (08-18-17 @ 07:24) (109/63 - 121/75)  RR: 20 (08-18-17 @ 07:24) (18 - 20)  SpO2: 96% (08-18-17 @ 07:24) (96% - 98%)  Telemetry:   CAPILLARY BLOOD GLUCOSE        I&O's Summary    17 Aug 2017 07:01  -  18 Aug 2017 07:00  --------------------------------------------------------  IN: 2306 mL / OUT: 1500 mL / NET: 806 mL    18 Aug 2017 07:01  -  18 Aug 2017 12:45  --------------------------------------------------------  IN: 330 mL / OUT: 400 mL / NET: -70 mL        PHYSICAL EXAM:  GENERAL: NAD, well-developed  HEAD:  Atraumatic, Normocephalic  EYES: EOMI, PERRLA, conjunctiva and sclera clear  NECK: Supple, No JVD  CHEST/LUNG: Clear to auscultation bilaterally; No wheeze  HEART: Regular rate and rhythm; No murmurs, rubs, or gallops  ABDOMEN: Soft, Nontender, Nondistended; Bowel sounds present  EXTREMITIES:  2+ Peripheral Pulses, No clubbing, cyanosis, or edema  PSYCH: AAOx3  NEUROLOGY: non-focal  SKIN: No rashes or lesions    LABS:                        10.1   5.2   )-----------( 170      ( 18 Aug 2017 06:46 )             29.1     08-18    141  |  107  |  19  ----------------------------<  157<H>  4.2   |  23  |  0.81    Ca    8.9      18 Aug 2017 06:46    TPro  5.4<L>  /  Alb  3.9  /  TBili  0.2  /  DBili  x   /  AST  16  /  ALT  13  /  AlkPhos  48  08-18              RADIOLOGY & ADDITIONAL TESTS:    Imaging Personally Reviewed:    Consultant(s) Notes Reviewed:      Care Discussed with Consultants/Other Providers:

## 2017-08-18 NOTE — PROGRESS NOTE ADULT - ASSESSMENT
76 f - chemoRx for Lymphoma in progress.  IV hydration  oncology evaluation Dr. Garcia noted  follow cbc, lytes  prophylaxis

## 2017-08-18 NOTE — PROGRESS NOTE ADULT - ASSESSMENT
S/p 3 cycle  chemotherapy for Non-Hodgkin's lymphoma. Doing well. Admitted for 4th cycle.  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   Rituxan, Etoposide, prednisone, Oncovin, Cytoxan, (hyroxy)Adriamcin (R-EPOCH)  Monitor CBC, CMP LDH Uric acid  IV hydration  Tolerating chemotherapy well   Continue chemotherapy today - adriamycin/etoposide/vincristine  Will complete chemotherapy on Sunday. Should return to the office on Monday for Neulasta

## 2017-08-19 LAB
ALBUMIN SERPL ELPH-MCNC: 3.5 G/DL — SIGNIFICANT CHANGE UP (ref 3.3–5)
ALP SERPL-CCNC: 46 U/L — SIGNIFICANT CHANGE UP (ref 40–120)
ALT FLD-CCNC: 17 U/L RC — SIGNIFICANT CHANGE UP (ref 10–45)
ANION GAP SERPL CALC-SCNC: 10 MMOL/L — SIGNIFICANT CHANGE UP (ref 5–17)
AST SERPL-CCNC: 16 U/L — SIGNIFICANT CHANGE UP (ref 10–40)
BASOPHILS # BLD AUTO: 0 K/UL — SIGNIFICANT CHANGE UP (ref 0–0.2)
BASOPHILS NFR BLD AUTO: 0 % — SIGNIFICANT CHANGE UP (ref 0–2)
BILIRUB SERPL-MCNC: 0.3 MG/DL — SIGNIFICANT CHANGE UP (ref 0.2–1.2)
BUN SERPL-MCNC: 19 MG/DL — SIGNIFICANT CHANGE UP (ref 7–23)
CALCIUM SERPL-MCNC: 8.8 MG/DL — SIGNIFICANT CHANGE UP (ref 8.4–10.5)
CHLORIDE SERPL-SCNC: 105 MMOL/L — SIGNIFICANT CHANGE UP (ref 96–108)
CO2 SERPL-SCNC: 25 MMOL/L — SIGNIFICANT CHANGE UP (ref 22–31)
CREAT SERPL-MCNC: 0.72 MG/DL — SIGNIFICANT CHANGE UP (ref 0.5–1.3)
EOSINOPHIL # BLD AUTO: 0 K/UL — SIGNIFICANT CHANGE UP (ref 0–0.5)
EOSINOPHIL NFR BLD AUTO: 0.1 % — SIGNIFICANT CHANGE UP (ref 0–6)
GLUCOSE SERPL-MCNC: 129 MG/DL — HIGH (ref 70–99)
HCT VFR BLD CALC: 28.5 % — LOW (ref 34.5–45)
HGB BLD-MCNC: 10 G/DL — LOW (ref 11.5–15.5)
LDH SERPL L TO P-CCNC: 166 U/L — SIGNIFICANT CHANGE UP (ref 50–242)
LYMPHOCYTES # BLD AUTO: 0.6 K/UL — LOW (ref 1–3.3)
LYMPHOCYTES # BLD AUTO: 10.6 % — LOW (ref 13–44)
MCHC RBC-ENTMCNC: 34.9 GM/DL — SIGNIFICANT CHANGE UP (ref 32–36)
MCHC RBC-ENTMCNC: 35.9 PG — HIGH (ref 27–34)
MCV RBC AUTO: 103 FL — HIGH (ref 80–100)
MONOCYTES # BLD AUTO: 0.4 K/UL — SIGNIFICANT CHANGE UP (ref 0–0.9)
MONOCYTES NFR BLD AUTO: 7.4 % — SIGNIFICANT CHANGE UP (ref 2–14)
NEUTROPHILS # BLD AUTO: 4.6 K/UL — SIGNIFICANT CHANGE UP (ref 1.8–7.4)
NEUTROPHILS NFR BLD AUTO: 81.9 % — HIGH (ref 43–77)
PLATELET # BLD AUTO: 160 K/UL — SIGNIFICANT CHANGE UP (ref 150–400)
POTASSIUM SERPL-MCNC: 4 MMOL/L — SIGNIFICANT CHANGE UP (ref 3.5–5.3)
POTASSIUM SERPL-SCNC: 4 MMOL/L — SIGNIFICANT CHANGE UP (ref 3.5–5.3)
PROT SERPL-MCNC: 5.1 G/DL — LOW (ref 6–8.3)
RBC # BLD: 2.77 M/UL — LOW (ref 3.8–5.2)
RBC # FLD: 16.3 % — HIGH (ref 10.3–14.5)
SODIUM SERPL-SCNC: 140 MMOL/L — SIGNIFICANT CHANGE UP (ref 135–145)
URATE SERPL-MCNC: 2.1 MG/DL — LOW (ref 2.5–7)
WBC # BLD: 5.6 K/UL — SIGNIFICANT CHANGE UP (ref 3.8–10.5)
WBC # FLD AUTO: 5.6 K/UL — SIGNIFICANT CHANGE UP (ref 3.8–10.5)

## 2017-08-19 RX ORDER — POLYETHYLENE GLYCOL 3350 17 G/17G
17 POWDER, FOR SOLUTION ORAL DAILY
Qty: 0 | Refills: 0 | Status: DISCONTINUED | OUTPATIENT
Start: 2017-08-19 | End: 2017-08-20

## 2017-08-19 RX ADMIN — Medication 400 MILLIGRAM(S): at 21:20

## 2017-08-19 RX ADMIN — Medication 400 MILLIGRAM(S): at 09:58

## 2017-08-19 RX ADMIN — SODIUM CHLORIDE 50 MILLILITER(S): 9 INJECTION, SOLUTION INTRAVENOUS at 21:20

## 2017-08-19 RX ADMIN — Medication 300 MILLIGRAM(S): at 11:35

## 2017-08-19 RX ADMIN — HEPARIN SODIUM 5000 UNIT(S): 5000 INJECTION INTRAVENOUS; SUBCUTANEOUS at 05:28

## 2017-08-19 RX ADMIN — FAMOTIDINE 100 MILLIGRAM(S): 10 INJECTION INTRAVENOUS at 13:36

## 2017-08-19 RX ADMIN — ONDANSETRON 116 MILLIGRAM(S): 8 TABLET, FILM COATED ORAL at 14:00

## 2017-08-19 RX ADMIN — Medication 50 MILLIGRAM(S): at 17:30

## 2017-08-19 RX ADMIN — SODIUM CHLORIDE 50 MILLILITER(S): 9 INJECTION, SOLUTION INTRAVENOUS at 17:30

## 2017-08-19 RX ADMIN — HEPARIN SODIUM 5000 UNIT(S): 5000 INJECTION INTRAVENOUS; SUBCUTANEOUS at 17:30

## 2017-08-19 RX ADMIN — Medication 50 MILLIGRAM(S): at 05:28

## 2017-08-19 NOTE — PROGRESS NOTE ADULT - ASSESSMENT
76 f - due to complete 4th cycle of chemo for NH Lymphoma camila. DC home thereafter as per Oncology  Miralax camila if no BM today

## 2017-08-19 NOTE — PROGRESS NOTE ADULT - SUBJECTIVE AND OBJECTIVE BOX
Patient is a 76y old  Female who presents with a chief complaint of chemotherapy (16 Aug 2017 17:47)      SUBJECTIVE / OVERNIGHT EVENTS: ptn has no new c/o, no BM for 3 days, awaiting DC home camila    MEDICATIONS  (STANDING):  dextrose 5% + sodium chloride 0.45%. 1000 milliLiter(s) (50 mL/Hr) IV Continuous <Continuous>  ondansetron  IVPB 16 milliGRAM(s) IV Intermittent daily  famotidine  IVPB 20 milliGRAM(s) IV Intermittent daily  predniSONE   Tablet 50 milliGRAM(s) Oral two times a day  etoposide IVPB 82 milliGRAM(s) IV Intermittent daily  DOXOrubicin IVPB w/vinCRIStine 16 milliGRAM(s) IV Intermittent daily  heparin  Injectable 5000 Unit(s) SubCutaneous every 12 hours  allopurinol 300 milliGRAM(s) Oral daily  acyclovir   Tablet 400 milliGRAM(s) Oral <User Schedule>  trimethoprim  160 mG/sulfamethoxazole 800 mG 1 Tablet(s) Oral <User Schedule>    MEDICATIONS  (PRN):      Vital Signs Last 24 Hrs  T(F): 97.3 (08-19-17 @ 15:41), Max: 97.9 (08-19-17 @ 07:49)  HR: 80 (08-19-17 @ 15:41) (70 - 80)  BP: 118/68 (08-19-17 @ 15:41) (113/71 - 121/69)  RR: 18 (08-19-17 @ 15:41) (18 - 20)  SpO2: 95% (08-19-17 @ 15:41) (95% - 97%)  Telemetry:   CAPILLARY BLOOD GLUCOSE        I&O's Summary    18 Aug 2017 07:01  -  19 Aug 2017 07:00  --------------------------------------------------------  IN: 1934 mL / OUT: 3800 mL / NET: -1866 mL    19 Aug 2017 07:01  -  19 Aug 2017 20:09  --------------------------------------------------------  IN: 1734 mL / OUT: 1350 mL / NET: 384 mL        PHYSICAL EXAM:  GENERAL: NAD, well-developed  HEAD:  Atraumatic, Normocephalic  EYES: EOMI, PERRLA, conjunctiva and sclera clear  NECK: Supple, No JVD  CHEST/LUNG: Clear to auscultation bilaterally; No wheeze  HEART: Regular rate and rhythm; No murmurs, rubs, or gallops  ABDOMEN: Soft, Nontender, Nondistended; Bowel sounds present  EXTREMITIES:  2+ Peripheral Pulses, No clubbing, cyanosis, or edema  PSYCH: AAOx3  NEUROLOGY: non-focal  SKIN: No rashes or lesions    LABS:                        10.0   5.6   )-----------( 160      ( 19 Aug 2017 07:04 )             28.5     08-19    140  |  105  |  19  ----------------------------<  129<H>  4.0   |  25  |  0.72    Ca    8.8      19 Aug 2017 07:04    TPro  5.1<L>  /  Alb  3.5  /  TBili  0.3  /  DBili  x   /  AST  16  /  ALT  17  /  AlkPhos  46  08-19              RADIOLOGY & ADDITIONAL TESTS:    Imaging Personally Reviewed:    Consultant(s) Notes Reviewed:      Care Discussed with Consultants/Other Providers:

## 2017-08-19 NOTE — PROGRESS NOTE ADULT - SUBJECTIVE AND OBJECTIVE BOX
76 year old female with DLBCL, Double Hit, admitted for Cycle #4 R-EPOCH    Pt is seen and examined. Patient is awake and lying in bed. Appears comfortable, denies any current complaints. No fevers, chills, cp sob, nausea/vomiting/diarrhea, abdominal pain, dizziness or leg swelling. Toelrating chemotherapy well, Day #4 today        MEDICATIONS  (STANDING):  dextrose 5% + sodium chloride 0.45%. 1000 milliLiter(s) (50 mL/Hr) IV Continuous <Continuous>  ondansetron  IVPB 16 milliGRAM(s) IV Intermittent daily  famotidine  IVPB 20 milliGRAM(s) IV Intermittent daily  predniSONE   Tablet 50 milliGRAM(s) Oral two times a day  etoposide IVPB 82 milliGRAM(s) IV Intermittent daily  DOXOrubicin IVPB w/vinCRIStine 16 milliGRAM(s) IV Intermittent daily  heparin  Injectable 5000 Unit(s) SubCutaneous every 12 hours  allopurinol 300 milliGRAM(s) Oral daily  acyclovir   Tablet 400 milliGRAM(s) Oral <User Schedule>  trimethoprim  160 mG/sulfamethoxazole 800 mG 1 Tablet(s) Oral <User Schedule>    MEDICATIONS  (PRN):      Allergies    No Known Allergies    Intolerances    Vital Signs Last 24 Hrs  T(C): 36.6 (19 Aug 2017 07:49), Max: 36.6 (18 Aug 2017 15:48)  T(F): 97.9 (19 Aug 2017 07:49), Max: 97.9 (18 Aug 2017 15:48)  HR: 70 (19 Aug 2017 07:49) (66 - 72)  BP: 113/71 (19 Aug 2017 07:49) (111/64 - 121/69)  BP(mean): --  RR: 20 (19 Aug 2017 07:49) (18 - 20)  SpO2: 97% (19 Aug 2017 07:49) (96% - 97%)      PHYSICAL EXAM  General: adult in NAD, comfortable  HEENT: clear oropharynx, anicteric sclera, pink conjunctiva  Neck: supple  CV: normal S1/S2 with no murmur rubs or gallops  Lungs: positive air movement b/l ant lungs,clear to auscultation, no wheezes, no rales  Abdomen: soft non-tender non-distended, no hepatosplenomegaly  Ext: no clubbing cyanosis or edema  Skin: no rashes and no petechiae  Neuro: alert and oriented X 4, no focal deficits    LABS:                            10.0   5.6   )-----------( 160      ( 19 Aug 2017 07:04 )             28.5     Mean Cell Volume : 103.0 fl  Mean Cell Hemoglobin : 35.9 pg  Mean Cell Hemoglobin Concentration : 34.9 gm/dL  Auto Neutrophil # : 4.6 K/uL  Auto Lymphocyte # : 0.6 K/uL  Auto Monocyte # : 0.4 K/uL  Auto Eosinophil # : 0.0 K/uL  Auto Basophil # : 0.0 K/uL  Auto Neutrophil % : 81.9 %  Auto Lymphocyte % : 10.6 %  Auto Monocyte % : 7.4 %  Auto Eosinophil % : 0.1 %  Auto Basophil % : 0.0 %    08-19    140  |  105  |  19  ----------------------------<  129<H>  4.0   |  25  |  0.72    Ca    8.8      19 Aug 2017 07:04    TPro  5.1<L>  /  Alb  3.5  /  TBili  0.3  /  DBili  x   /  AST  16  /  ALT  17  /  AlkPhos  46  08-19                 Lactate Dehydrogenase, Serum: 166 U/L (08-19)  Lactate Dehydrogenase, Serum: 167 U/L (08-18 @ 06:46)  Lactate Dehydrogenase, Serum: 203 U/L (08-17 @ 06:57)  Lactate Dehydrogenase, Serum: 205 U/L (08-16 @ 10:47)

## 2017-08-19 NOTE — PROGRESS NOTE ADULT - ASSESSMENT
S/p 3 cycle  chemotherapy for Non-Hodgkin's lymphoma, DLBCL, Double Hit. Doing well. Admitted for 4th cycle.  Mild peripheral neuropathy secondary to chemotherapy  CBC,CMP , uric acid, LDH reviewed -- stable, no intervention needed  Current regimen --- Rituxan, Etoposide, prednisone, Oncovin, Cytoxan, (hyroxy)Adriamcin (R-EPOCH)  Monitor CBC, CMP LDH Uric acid daily  IV hydration  Tolerating chemotherapy well    Continue chemotherapy today - adriamycin/etoposide/vincristine  Due to complete chemotherapy on Sunday 8/20  Follow up as outpatient for Neulasta injection on Monday 8/21  Continue supportive care    Follow up with Dr Ricks 7-10 days post Neulasta for CBC check and discussion of further treatment. Tentatively planning for total of 6-8 cycles. Consider timing of repeat scans

## 2017-08-19 NOTE — ADVANCED PRACTICE NURSE CONSULT - ASSESSMENT
Patient received in bed, alert and oriented x 3. Cycle #4,day 4/5. Height and weight verified. Lab results as per Md gurpreet aware of same. Vital signs stable prior to chemotherapy, and  within acceptable parameters, see sunrise. Pt re-educated on the importance of saving urine, verbalizes good understanding. Pt re-education done re chemo regimen, drug effects and potential side effects, pt states understanding. Reports that she has been tolerating chemotherapy well without side effects. Pt with Right chest wall  Mediport  line, site free from signs and symptoms of infection, good blood return obtained. Pre- medicated with Zofran 16 mg IVSS Pepcid 20 mg IVSS. Etoposide 50 mg/m2=82 mg continuos infusion @ 23 ml/hr Doxorubicin 10 mg/m2 =16 mg continuos infusion mixed with vincristine 0.4 mg/m2=0.65 mg continuos @ 23 ml/hr. Same initiated at 1400 via locked pump. Patient tolerating same well without side effects.

## 2017-08-20 ENCOUNTER — TRANSCRIPTION ENCOUNTER (OUTPATIENT)
Age: 77
End: 2017-08-20

## 2017-08-20 VITALS
HEART RATE: 66 BPM | TEMPERATURE: 98 F | RESPIRATION RATE: 20 BRPM | DIASTOLIC BLOOD PRESSURE: 71 MMHG | SYSTOLIC BLOOD PRESSURE: 127 MMHG | OXYGEN SATURATION: 98 %

## 2017-08-20 LAB
ALBUMIN SERPL ELPH-MCNC: 3.2 G/DL — LOW (ref 3.3–5)
ALP SERPL-CCNC: 43 U/L — SIGNIFICANT CHANGE UP (ref 40–120)
ALT FLD-CCNC: 15 U/L RC — SIGNIFICANT CHANGE UP (ref 10–45)
ANION GAP SERPL CALC-SCNC: 10 MMOL/L — SIGNIFICANT CHANGE UP (ref 5–17)
AST SERPL-CCNC: 16 U/L — SIGNIFICANT CHANGE UP (ref 10–40)
BASOPHILS # BLD AUTO: 0 K/UL — SIGNIFICANT CHANGE UP (ref 0–0.2)
BASOPHILS NFR BLD AUTO: 0.1 % — SIGNIFICANT CHANGE UP (ref 0–2)
BILIRUB SERPL-MCNC: 0.4 MG/DL — SIGNIFICANT CHANGE UP (ref 0.2–1.2)
BUN SERPL-MCNC: 19 MG/DL — SIGNIFICANT CHANGE UP (ref 7–23)
CALCIUM SERPL-MCNC: 8.9 MG/DL — SIGNIFICANT CHANGE UP (ref 8.4–10.5)
CHLORIDE SERPL-SCNC: 106 MMOL/L — SIGNIFICANT CHANGE UP (ref 96–108)
CO2 SERPL-SCNC: 26 MMOL/L — SIGNIFICANT CHANGE UP (ref 22–31)
CREAT SERPL-MCNC: 0.71 MG/DL — SIGNIFICANT CHANGE UP (ref 0.5–1.3)
EOSINOPHIL # BLD AUTO: 0 K/UL — SIGNIFICANT CHANGE UP (ref 0–0.5)
EOSINOPHIL NFR BLD AUTO: 0.4 % — SIGNIFICANT CHANGE UP (ref 0–6)
GLUCOSE SERPL-MCNC: 108 MG/DL — HIGH (ref 70–99)
HCT VFR BLD CALC: 27.9 % — LOW (ref 34.5–45)
HGB BLD-MCNC: 9.3 G/DL — LOW (ref 11.5–15.5)
LDH SERPL L TO P-CCNC: 160 U/L — SIGNIFICANT CHANGE UP (ref 50–242)
LYMPHOCYTES # BLD AUTO: 0.8 K/UL — LOW (ref 1–3.3)
LYMPHOCYTES # BLD AUTO: 19.6 % — SIGNIFICANT CHANGE UP (ref 13–44)
MCHC RBC-ENTMCNC: 33.3 GM/DL — SIGNIFICANT CHANGE UP (ref 32–36)
MCHC RBC-ENTMCNC: 34.4 PG — HIGH (ref 27–34)
MCV RBC AUTO: 103 FL — HIGH (ref 80–100)
MONOCYTES # BLD AUTO: 0.1 K/UL — SIGNIFICANT CHANGE UP (ref 0–0.9)
MONOCYTES NFR BLD AUTO: 2.6 % — SIGNIFICANT CHANGE UP (ref 2–14)
NEUTROPHILS # BLD AUTO: 3.1 K/UL — SIGNIFICANT CHANGE UP (ref 1.8–7.4)
NEUTROPHILS NFR BLD AUTO: 77.4 % — HIGH (ref 43–77)
PLATELET # BLD AUTO: 157 K/UL — SIGNIFICANT CHANGE UP (ref 150–400)
POTASSIUM SERPL-MCNC: 3.6 MMOL/L — SIGNIFICANT CHANGE UP (ref 3.5–5.3)
POTASSIUM SERPL-SCNC: 3.6 MMOL/L — SIGNIFICANT CHANGE UP (ref 3.5–5.3)
PROT SERPL-MCNC: 5 G/DL — LOW (ref 6–8.3)
RBC # BLD: 2.7 M/UL — LOW (ref 3.8–5.2)
RBC # FLD: 15.9 % — HIGH (ref 10.3–14.5)
SODIUM SERPL-SCNC: 142 MMOL/L — SIGNIFICANT CHANGE UP (ref 135–145)
URATE SERPL-MCNC: 2.3 MG/DL — LOW (ref 2.5–7)
WBC # BLD: 4 K/UL — SIGNIFICANT CHANGE UP (ref 3.8–10.5)
WBC # FLD AUTO: 4 K/UL — SIGNIFICANT CHANGE UP (ref 3.8–10.5)

## 2017-08-20 RX ORDER — CYCLOPHOSPHAMIDE 100 MG
815 VIAL (EA) INTRAVENOUS ONCE
Qty: 0 | Refills: 0 | Status: DISCONTINUED | OUTPATIENT
Start: 2017-08-20 | End: 2017-08-20

## 2017-08-20 RX ADMIN — Medication 300 MILLIGRAM(S): at 11:12

## 2017-08-20 RX ADMIN — ONDANSETRON 116 MILLIGRAM(S): 8 TABLET, FILM COATED ORAL at 12:30

## 2017-08-20 RX ADMIN — HEPARIN SODIUM 5000 UNIT(S): 5000 INJECTION INTRAVENOUS; SUBCUTANEOUS at 06:04

## 2017-08-20 RX ADMIN — FAMOTIDINE 100 MILLIGRAM(S): 10 INJECTION INTRAVENOUS at 12:10

## 2017-08-20 RX ADMIN — SODIUM CHLORIDE 50 MILLILITER(S): 9 INJECTION, SOLUTION INTRAVENOUS at 06:06

## 2017-08-20 RX ADMIN — Medication 50 MILLIGRAM(S): at 06:04

## 2017-08-20 NOTE — PROGRESS NOTE ADULT - SUBJECTIVE AND OBJECTIVE BOX
76 year old female with DLBCL, Double Hit, admitted for Cycle #4 R-EPOCH    Pt is seen and examined. Patient is awake and sitting in chair at bedside. Appears comfortable, denies any current complaints. Reports mild nausea yesterday, now resolved. No fevers, chills, cp sob, vomiting/diarrhea, abdominal pain, dizziness or leg swelling. Tolerating chemotherapy well, Day #5 today        MEDICATIONS  (STANDING):  dextrose 5% + sodium chloride 0.45%. 1000 milliLiter(s) (50 mL/Hr) IV Continuous <Continuous>  ondansetron  IVPB 16 milliGRAM(s) IV Intermittent daily  famotidine  IVPB 20 milliGRAM(s) IV Intermittent daily  predniSONE   Tablet 50 milliGRAM(s) Oral two times a day  etoposide IVPB 82 milliGRAM(s) IV Intermittent daily  DOXOrubicin IVPB w/vinCRIStine 16 milliGRAM(s) IV Intermittent daily  heparin  Injectable 5000 Unit(s) SubCutaneous every 12 hours  allopurinol 300 milliGRAM(s) Oral daily  acyclovir   Tablet 400 milliGRAM(s) Oral <User Schedule>  trimethoprim  160 mG/sulfamethoxazole 800 mG 1 Tablet(s) Oral <User Schedule>    MEDICATIONS  (PRN):      Allergies    No Known Allergies    Intolerances    Vital Signs Last 24 Hrs  T(C): 36.6 (20 Aug 2017 00:39), Max: 36.6 (20 Aug 2017 00:39)  T(F): 97.9 (20 Aug 2017 00:39), Max: 97.9 (20 Aug 2017 00:39)  HR: 64 (20 Aug 2017 00:39) (64 - 80)  BP: 112/66 (20 Aug 2017 00:39) (112/66 - 118/68)  BP(mean): --  RR: 18 (20 Aug 2017 00:39) (18 - 18)  SpO2: 97% (20 Aug 2017 00:39) (95% - 97%)      PHYSICAL EXAM  General: adult in NAD, comfortable  HEENT: clear oropharynx, anicteric sclera, pink conjunctiva  Neck: supple  CV: normal S1/S2 with no murmur rubs or gallops  Lungs: positive air movement b/l ant lungs,clear to auscultation, no wheezes, no rales  Abdomen: soft non-tender non-distended, no hepatosplenomegaly  Ext: no clubbing cyanosis or edema  Skin: no rashes and no petechiae  Neuro: alert and oriented X 4, no focal deficits    LABS:                          9.3    4.0   )-----------( 157      ( 20 Aug 2017 08:05 )             27.9     Mean Cell Volume : 103.0 fl  Mean Cell Hemoglobin : 34.4 pg  Mean Cell Hemoglobin Concentration : 33.3 gm/dL  Auto Neutrophil # : 3.1 K/uL  Auto Lymphocyte # : 0.8 K/uL  Auto Monocyte # : 0.1 K/uL  Auto Eosinophil # : 0.0 K/uL  Auto Basophil # : 0.0 K/uL  Auto Neutrophil % : 77.4 %  Auto Lymphocyte % : 19.6 %  Auto Monocyte % : 2.6 %  Auto Eosinophil % : 0.4 %  Auto Basophil % : 0.1 %           08-20    142  |  106  |  19  ----------------------------<  108<H>  3.6   |  26  |  0.71    Ca    8.9      20 Aug 2017 08:05    TPro  5.0<L>  /  Alb  3.2<L>  /  TBili  0.4  /  DBili  x   /  AST  16  /  ALT  15  /  AlkPhos  43  08-20               Lactate Dehydrogenase, Serum 160 U/L (08-20)  Lactate Dehydrogenase, Serum: 166 U/L (08-19)  Lactate Dehydrogenase, Serum: 167 U/L (08-18 @ 06:46)  Lactate Dehydrogenase, Serum: 203 U/L (08-17 @ 06:57)  Lactate Dehydrogenase, Serum: 205 U/L (08-16 @ 10:47)

## 2017-08-20 NOTE — DISCHARGE NOTE ADULT - CARE PLAN
Principal Discharge DX:	Diffuse large B-cell lymphoma, unspecified body region  Goal:	Remission  Instructions for follow-up, activity and diet:	Diet and activity as noted  Follow up on Monday with Dr. Ricks for Neulasta  Return for any worsening symptoms

## 2017-08-20 NOTE — DISCHARGE NOTE ADULT - CARE PROVIDER_API CALL
Campos Ricks), Internal Medicine; Medical Oncology  [address] [place] [address]  Phone: [phone]  Fax: [phone]

## 2017-08-20 NOTE — DISCHARGE NOTE ADULT - PLAN OF CARE
Remission Diet and activity as noted  Follow up on Monday with Dr. Ricks for Darren  Return for any worsening symptoms

## 2017-08-20 NOTE — PROGRESS NOTE ADULT - SUBJECTIVE AND OBJECTIVE BOX
Patient is a 76y old  Female who presents with a chief complaint of chemotherapy (20 Aug 2017 07:42)      SUBJECTIVE / OVERNIGHT EVENTS:no new c/o    MEDICATIONS  (STANDING):  dextrose 5% + sodium chloride 0.45%. 1000 milliLiter(s) (50 mL/Hr) IV Continuous <Continuous>  predniSONE   Tablet 50 milliGRAM(s) Oral two times a day  etoposide IVPB 82 milliGRAM(s) IV Intermittent daily  DOXOrubicin IVPB w/vinCRIStine 16 milliGRAM(s) IV Intermittent daily  heparin  Injectable 5000 Unit(s) SubCutaneous every 12 hours  allopurinol 300 milliGRAM(s) Oral daily  acyclovir   Tablet 400 milliGRAM(s) Oral <User Schedule>  trimethoprim  160 mG/sulfamethoxazole 800 mG 1 Tablet(s) Oral <User Schedule>  cyclophosphamide IVPB 815 milliGRAM(s) IV Intermittent once    MEDICATIONS  (PRN):  polyethylene glycol 3350 17 Gram(s) Oral daily PRN Constipation      Vital Signs Last 24 Hrs  T(F): 98.2 (08-20-17 @ 14:20), Max: 98.2 (08-20-17 @ 14:20)  HR: 66 (08-20-17 @ 14:20) (64 - 66)  BP: 127/71 (08-20-17 @ 14:20) (112/66 - 127/71)  RR: 20 (08-20-17 @ 14:20) (18 - 20)  SpO2: 98% (08-20-17 @ 14:20) (96% - 98%)  Telemetry:   CAPILLARY BLOOD GLUCOSE        I&O's Summary    19 Aug 2017 07:01  -  20 Aug 2017 07:00  --------------------------------------------------------  IN: 2286 mL / OUT: 2450 mL / NET: -164 mL    20 Aug 2017 07:01  -  20 Aug 2017 19:25  --------------------------------------------------------  IN: 1517 mL / OUT: 1100 mL / NET: 417 mL        PHYSICAL EXAM:  GENERAL: NAD, well-developed  HEAD:  Atraumatic, Normocephalic  EYES: EOMI, PERRLA, conjunctiva and sclera clear  NECK: Supple, No JVD  CHEST/LUNG: Clear to auscultation bilaterally; No wheeze  HEART: Regular rate and rhythm; No murmurs, rubs, or gallops  ABDOMEN: Soft, Nontender, Nondistended; Bowel sounds present  EXTREMITIES:  2+ Peripheral Pulses, No clubbing, cyanosis, or edema  PSYCH: AAOx3  NEUROLOGY: non-focal  SKIN: No rashes or lesions    LABS:                        9.3    4.0   )-----------( 157      ( 20 Aug 2017 08:05 )             27.9     08-20    142  |  106  |  19  ----------------------------<  108<H>  3.6   |  26  |  0.71    Ca    8.9      20 Aug 2017 08:05    TPro  5.0<L>  /  Alb  3.2<L>  /  TBili  0.4  /  DBili  x   /  AST  16  /  ALT  15  /  AlkPhos  43  08-20              RADIOLOGY & ADDITIONAL TESTS:    Imaging Personally Reviewed:    Consultant(s) Notes Reviewed:      Care Discussed with Consultants/Other Providers:

## 2017-08-20 NOTE — DISCHARGE NOTE ADULT - MEDICATION SUMMARY - MEDICATIONS TO TAKE
I will START or STAY ON the medications listed below when I get home from the hospital:    allopurinol 300 mg oral tablet  -- 1 tab(s) by mouth once a day  -- Indication: For Uric acid/Gout    acyclovir 400 mg oral tablet  -- 1 tab(s) by mouth   -- Indication: For Antiviral    sulfamethoxazole-trimethoprim 800 mg-160 mg oral tablet  -- 1 tab(s) by mouth     -- Indication: For Need for prophylatic measure

## 2017-08-20 NOTE — PROGRESS NOTE ADULT - ASSESSMENT
76 f - due to complete 4th cycle of chemo for NH Lymphoma camila.completed her chemo cycle today. DC home as per Oncology

## 2017-08-20 NOTE — DISCHARGE NOTE ADULT - PATIENT PORTAL LINK FT
“You can access the [employer] Patient Portal, offered by [facility], by registering with the following website: [email or web address]”

## 2017-08-20 NOTE — PROGRESS NOTE ADULT - ASSESSMENT
S/p 3 cycle  chemotherapy for Non-Hodgkin's lymphoma, DLBCL, Double Hit. Doing well. Admitted for 4th cycle.  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 Tentatively planning for total of 6-8 cycles. Consider timing of repeat scans

## 2017-08-20 NOTE — ADVANCED PRACTICE NURSE CONSULT - ASSESSMENT
Cycle #5 day 5/5.. Lab results as per Md gurpreet aware of same. Vital signs stable prior to the start of chemotherapy, see sunrise.  Pt educated on the importance of saving urine, verbalize understanding of same.Pt education done re chemo regimen, drug effects and potential side effects, written material provided, pt states understanding. Pt reports the she has tolerated previous chemotherapy without side effects.pt completed with the day 4 of CI drip Pt with chest wall mediport   site free from signs and symptoms of infection, positive blood return obtained. Pre-medicated with pepcid 20mg ivss, zofran 16mgivss pt started on cytoxan 815mg ivss over 1 hr intiated at 1315 pt resting in bed no complaints voiding adequately Cycle #5 day 5/5.. Lab results as per Md gurpreet aware of same. Vital signs stable prior to the start of chemotherapy, see sunrise.  Pt educated on the importance of saving urine, verbalize understanding of same.Pt education done re chemo regimen, drug effects and potential side effects, written material provided, pt states understanding. Pt reports the she has tolerated previous chemotherapy without side effects.pt completed with the day 4 of CI drip Pt with chest wall mediport   site free from signs and symptoms of infection, positive blood return obtained. Pre-medicated with pepcid 20mg ivss, zofran 16mgivss pt started on cytoxan 500mg /m2= 815mg ivss over 1 hr intiated at 1315 pt resting in bed no complaints voiding adequately completed infusion with no adverse effects post v/s stable pt cleared for discharge report given to the area nurse

## 2017-08-20 NOTE — DISCHARGE NOTE ADULT - HOSPITAL COURSE
To be completed by MD 8/12	h/o MM, on chemo, sent by hm/onc due to low blood count, for transfusion, admitted with Pancytopenia, receiving PRBC and platelet transfusion, received one dose of ceftriaxone, ID and hm/onc were called, follow up morning blood work.   ....pt with pancytopenia . daily labs cbc and cmpAdmitted for 4th cycle.  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    OK to proceed with chemotherapy  7/18    Last chemo on sunday, Neulasta on monday in the office

## 2017-09-20 ENCOUNTER — INPATIENT (INPATIENT)
Facility: HOSPITAL | Age: 77
LOS: 3 days | Discharge: HOME | DRG: 847 | End: 2017-09-24
Attending: INTERNAL MEDICINE | Admitting: INTERNAL MEDICINE
Payer: MEDICARE

## 2017-09-20 VITALS
WEIGHT: 125.66 LBS | TEMPERATURE: 99 F | OXYGEN SATURATION: 100 % | HEART RATE: 82 BPM | RESPIRATION RATE: 18 BRPM | HEIGHT: 66 IN | SYSTOLIC BLOOD PRESSURE: 107 MMHG | DIASTOLIC BLOOD PRESSURE: 66 MMHG

## 2017-09-20 DIAGNOSIS — D27.9 BENIGN NEOPLASM OF UNSPECIFIED OVARY: Chronic | ICD-10-CM

## 2017-09-20 DIAGNOSIS — C83.30 DIFFUSE LARGE B-CELL LYMPHOMA, UNSPECIFIED SITE: ICD-10-CM

## 2017-09-20 DIAGNOSIS — C83.3 DIFFUSE LARGE B-CELL LYMPHOMA: ICD-10-CM

## 2017-09-20 DIAGNOSIS — Z86.018 PERSONAL HISTORY OF OTHER BENIGN NEOPLASM: Chronic | ICD-10-CM

## 2017-09-20 DIAGNOSIS — Z98.890 OTHER SPECIFIED POSTPROCEDURAL STATES: Chronic | ICD-10-CM

## 2017-09-20 LAB
ALBUMIN SERPL ELPH-MCNC: 4.3 G/DL — SIGNIFICANT CHANGE UP (ref 3.3–5)
ALP SERPL-CCNC: 57 U/L — SIGNIFICANT CHANGE UP (ref 40–120)
ALT FLD-CCNC: 18 U/L RC — SIGNIFICANT CHANGE UP (ref 10–45)
ANION GAP SERPL CALC-SCNC: 10 MMOL/L — SIGNIFICANT CHANGE UP (ref 5–17)
AST SERPL-CCNC: 19 U/L — SIGNIFICANT CHANGE UP (ref 10–40)
BASOPHILS # BLD AUTO: 0 K/UL — SIGNIFICANT CHANGE UP (ref 0–0.2)
BASOPHILS NFR BLD AUTO: 0.4 % — SIGNIFICANT CHANGE UP (ref 0–2)
BILIRUB SERPL-MCNC: 0.3 MG/DL — SIGNIFICANT CHANGE UP (ref 0.2–1.2)
BUN SERPL-MCNC: 23 MG/DL — SIGNIFICANT CHANGE UP (ref 7–23)
CALCIUM SERPL-MCNC: 9.1 MG/DL — SIGNIFICANT CHANGE UP (ref 8.4–10.5)
CHLORIDE SERPL-SCNC: 108 MMOL/L — SIGNIFICANT CHANGE UP (ref 96–108)
CO2 SERPL-SCNC: 24 MMOL/L — SIGNIFICANT CHANGE UP (ref 22–31)
CREAT SERPL-MCNC: 0.89 MG/DL — SIGNIFICANT CHANGE UP (ref 0.5–1.3)
EOSINOPHIL # BLD AUTO: 0 K/UL — SIGNIFICANT CHANGE UP (ref 0–0.5)
EOSINOPHIL NFR BLD AUTO: 0.5 % — SIGNIFICANT CHANGE UP (ref 0–6)
GLUCOSE SERPL-MCNC: 106 MG/DL — HIGH (ref 70–99)
HCT VFR BLD CALC: 32.4 % — LOW (ref 34.5–45)
HGB BLD-MCNC: 11.1 G/DL — LOW (ref 11.5–15.5)
LDH SERPL L TO P-CCNC: 213 U/L — SIGNIFICANT CHANGE UP (ref 50–242)
LYMPHOCYTES # BLD AUTO: 0.9 K/UL — LOW (ref 1–3.3)
LYMPHOCYTES # BLD AUTO: 15.8 % — SIGNIFICANT CHANGE UP (ref 13–44)
MCHC RBC-ENTMCNC: 34.1 GM/DL — SIGNIFICANT CHANGE UP (ref 32–36)
MCHC RBC-ENTMCNC: 36.1 PG — HIGH (ref 27–34)
MCV RBC AUTO: 106 FL — HIGH (ref 80–100)
MONOCYTES # BLD AUTO: 0.2 K/UL — SIGNIFICANT CHANGE UP (ref 0–0.9)
MONOCYTES NFR BLD AUTO: 3.3 % — SIGNIFICANT CHANGE UP (ref 2–14)
NEUTROPHILS # BLD AUTO: 4.6 K/UL — SIGNIFICANT CHANGE UP (ref 1.8–7.4)
NEUTROPHILS NFR BLD AUTO: 80.1 % — HIGH (ref 43–77)
PLAT MORPH BLD: NORMAL — SIGNIFICANT CHANGE UP
PLATELET # BLD AUTO: 191 K/UL — SIGNIFICANT CHANGE UP (ref 150–400)
POTASSIUM SERPL-MCNC: 4.6 MMOL/L — SIGNIFICANT CHANGE UP (ref 3.5–5.3)
POTASSIUM SERPL-SCNC: 4.6 MMOL/L — SIGNIFICANT CHANGE UP (ref 3.5–5.3)
PROT SERPL-MCNC: 6 G/DL — SIGNIFICANT CHANGE UP (ref 6–8.3)
RBC # BLD: 3.06 M/UL — LOW (ref 3.8–5.2)
RBC # FLD: 13.6 % — SIGNIFICANT CHANGE UP (ref 10.3–14.5)
RBC BLD AUTO: NORMAL — SIGNIFICANT CHANGE UP
SODIUM SERPL-SCNC: 142 MMOL/L — SIGNIFICANT CHANGE UP (ref 135–145)
URATE SERPL-MCNC: 1.7 MG/DL — LOW (ref 2.5–7)
WBC # BLD: 5.7 K/UL — SIGNIFICANT CHANGE UP (ref 3.8–10.5)
WBC # FLD AUTO: 5.7 K/UL — SIGNIFICANT CHANGE UP (ref 3.8–10.5)

## 2017-09-20 RX ORDER — SODIUM CHLORIDE 9 MG/ML
1000 INJECTION, SOLUTION INTRAVENOUS
Qty: 0 | Refills: 0 | Status: DISCONTINUED | OUTPATIENT
Start: 2017-09-20 | End: 2017-09-24

## 2017-09-20 RX ORDER — ONDANSETRON 8 MG/1
16 TABLET, FILM COATED ORAL DAILY
Qty: 0 | Refills: 0 | Status: COMPLETED | OUTPATIENT
Start: 2017-09-20 | End: 2017-09-24

## 2017-09-20 RX ORDER — ETOPOSIDE 20 MG/ML
80 VIAL (ML) INTRAVENOUS DAILY
Qty: 0 | Refills: 0 | Status: DISCONTINUED | OUTPATIENT
Start: 2017-09-20 | End: 2017-09-24

## 2017-09-20 RX ORDER — ACETAMINOPHEN 500 MG
650 TABLET ORAL EVERY 6 HOURS
Qty: 0 | Refills: 0 | Status: DISCONTINUED | OUTPATIENT
Start: 2017-09-20 | End: 2017-09-24

## 2017-09-20 RX ORDER — ALLOPURINOL 300 MG
300 TABLET ORAL DAILY
Qty: 0 | Refills: 0 | Status: DISCONTINUED | OUTPATIENT
Start: 2017-09-20 | End: 2017-09-24

## 2017-09-20 RX ORDER — DOXORUBICIN HYDROCHLORIDE 2 MG/ML
16 INJECTION, SOLUTION INTRAVENOUS DAILY
Qty: 0 | Refills: 0 | Status: DISCONTINUED | OUTPATIENT
Start: 2017-09-20 | End: 2017-09-24

## 2017-09-20 RX ORDER — ACYCLOVIR SODIUM 500 MG
400 VIAL (EA) INTRAVENOUS DAILY
Qty: 0 | Refills: 0 | Status: DISCONTINUED | OUTPATIENT
Start: 2017-09-20 | End: 2017-09-20

## 2017-09-20 RX ORDER — FOSAPREPITANT DIMEGLUMINE 150 MG/5ML
150 INJECTION, POWDER, LYOPHILIZED, FOR SOLUTION INTRAVENOUS ONCE
Qty: 0 | Refills: 0 | Status: COMPLETED | OUTPATIENT
Start: 2017-09-20 | End: 2017-09-20

## 2017-09-20 RX ORDER — HEPARIN SODIUM 5000 [USP'U]/ML
5000 INJECTION INTRAVENOUS; SUBCUTANEOUS EVERY 12 HOURS
Qty: 0 | Refills: 0 | Status: DISCONTINUED | OUTPATIENT
Start: 2017-09-20 | End: 2017-09-24

## 2017-09-20 RX ORDER — ACYCLOVIR SODIUM 500 MG
400 VIAL (EA) INTRAVENOUS
Qty: 0 | Refills: 0 | Status: DISCONTINUED | OUTPATIENT
Start: 2017-09-20 | End: 2017-09-24

## 2017-09-20 RX ORDER — DIPHENHYDRAMINE HCL 50 MG
25 CAPSULE ORAL ONCE
Qty: 0 | Refills: 0 | Status: COMPLETED | OUTPATIENT
Start: 2017-09-20 | End: 2017-09-20

## 2017-09-20 RX ORDER — RITUXIMAB 10 MG/ML
600 INJECTION, SOLUTION INTRAVENOUS ONCE
Qty: 0 | Refills: 0 | Status: DISCONTINUED | OUTPATIENT
Start: 2017-09-20 | End: 2017-09-24

## 2017-09-20 RX ORDER — FAMOTIDINE 10 MG/ML
20 INJECTION INTRAVENOUS DAILY
Qty: 0 | Refills: 0 | Status: COMPLETED | OUTPATIENT
Start: 2017-09-20 | End: 2017-09-24

## 2017-09-20 RX ORDER — ACETAMINOPHEN 500 MG
650 TABLET ORAL ONCE
Qty: 0 | Refills: 0 | Status: COMPLETED | OUTPATIENT
Start: 2017-09-20 | End: 2017-09-20

## 2017-09-20 RX ADMIN — Medication 650 MILLIGRAM(S): at 14:31

## 2017-09-20 RX ADMIN — HEPARIN SODIUM 5000 UNIT(S): 5000 INJECTION INTRAVENOUS; SUBCUTANEOUS at 17:54

## 2017-09-20 RX ADMIN — Medication 1 TABLET(S): at 17:52

## 2017-09-20 RX ADMIN — FOSAPREPITANT DIMEGLUMINE 300 MILLIGRAM(S): 150 INJECTION, POWDER, LYOPHILIZED, FOR SOLUTION INTRAVENOUS at 17:52

## 2017-09-20 RX ADMIN — Medication 50 MILLIGRAM(S): at 17:52

## 2017-09-20 RX ADMIN — Medication 25 MILLIGRAM(S): at 14:31

## 2017-09-20 RX ADMIN — ONDANSETRON 116 MILLIGRAM(S): 8 TABLET, FILM COATED ORAL at 18:10

## 2017-09-20 RX ADMIN — FAMOTIDINE 100 MILLIGRAM(S): 10 INJECTION INTRAVENOUS at 14:39

## 2017-09-20 NOTE — H&P ADULT - NSHPLABSRESULTS_GEN_ALL_CORE
11.1   5.7   )-----------( 191      ( 20 Sep 2017 11:51 )             32.4       09-20    142  |  108  |  23  ----------------------------<  106<H>  4.6   |  24  |  0.89    Ca    9.1      20 Sep 2017 11:51    TPro  6.0  /  Alb  4.3  /  TBili  0.3  /  DBili  x   /  AST  19  /  ALT  18  /  AlkPhos  57  09-20                      Lactate Trend            CAPILLARY BLOOD GLUCOSE

## 2017-09-20 NOTE — ADVANCED PRACTICE NURSE CONSULT - ASSESSMENT
Cycle #6  day 1/5 Height and weight verified.pt with rt chest wall mediport accessed as per protocol stat labs drawn and results reviewed by dr huma sloan to start the treatment  Pt educated on the importance of saving urine, verbalize understanding of same.Pt education done re chemo regimen, drug effects and potential side effects,  pt states understanding. Pt reports the she has tolerated previous chemotherapy without side effects.pt premed with [id number] po on day 1, benadryl 25mg ivp on day 1 , pepcid 20mg ivssdaily day 1-5 pt started on rituxin 375mg/g5=[id number] rounded off iv intiated at 1500 at 100cc/hr pt tolerated 30 min with no reaction increased rate by 100cc/hr every 30 min and v/s checked q 30 min pt resting in bed no complaints spouse at the bedside Cycle #6  day 1/5 Height and weight verified.pt with rt chest wall mediport accessed as per protocol stat labs drawn and results reviewed by dr huma sloan to start the treatment  Pt educated on the importance of saving urine, verbalize understanding of same.Pt education done re chemo regimen, drug effects and potential side effects,  pt states understanding. Pt reports the she has tolerated previous chemotherapy without side effects.pt premed with [id number] po on day 1, benadryl 25mg ivp on day 1 , pepcid 20mg ivssdaily day 1-5 pt started on rituxin 375mg/u2=694fu rounded off iv intiated at 1500 at 100cc/hr pt tolerated 30 min with no reaction increased rate by 100cc/hr every 30 min to a max rate of 400cc/hr and v/s checked q 30 min pt resting in bed no complaints spouse at the bedside completed with no adverse effects post v/s stable pt premed wit emend 150mg ivss, zofran 16mg ivss pt started on etoposide 50mg/m2=80.5 by continuous infusion rounded off to 80mg CI at 22cchr and doxorubicin 10mg/m2=16.1mg by continuous infusion rounded off to 16mg mixed with vincristin 0.4mg/m2= 0.64mg CI at 22cc/hr all intiated at 1845 pt resting in bed with no complaints v/s stable report given to the area nurse

## 2017-09-20 NOTE — H&P ADULT - ASSESSMENT
76 f for chemoRx for Lymphoma.  IV hydration  oncology evaluation Dr. Garcia  follow cbc, lytes  prophylaxis  Further action as per clinical course  Sea Wolff MD pager [phone]

## 2017-09-20 NOTE — H&P ADULT - NSHPPHYSICALEXAM_GEN_ALL_CORE
PHYSICAL EXAMINATION:  Vital Signs Last 24 Hrs  T(C): 37 (20 Sep 2017 10:40), Max: 37 (20 Sep 2017 10:40)  T(F): 98.6 (20 Sep 2017 10:40), Max: 98.6 (20 Sep 2017 10:40)  HR: 82 (20 Sep 2017 10:40) (82 - 82)  BP: 107/66 (20 Sep 2017 10:40) (107/66 - 107/66)  BP(mean): --  RR: 18 (20 Sep 2017 10:40) (18 - 18)  SpO2: 100% (20 Sep 2017 10:40) (100% - 100%)  CAPILLARY BLOOD GLUCOSE          GENERAL: NAD, well-groomed, well-developed  HEAD:  atraumatic, normocephalic  EYES: sclera anicteric  ENMT: mucous membranes moist  NECK: supple, No JVD  CHEST/LUNG: clear to auscultation bilaterally; no rales, rhonchi, or wheezing b/l  HEART: normal S1, S2  ABDOMEN: BS+, soft, ND, NT   EXTREMITIES:  pulses palpable; no clubbing, cyanosis, or edema b/l LEs  NEURO: awake, alert, interactive; moves all extremities  SKIN: no rashes or lesions

## 2017-09-21 DIAGNOSIS — D68.51 ACTIVATED PROTEIN C RESISTANCE: ICD-10-CM

## 2017-09-21 LAB
ALBUMIN SERPL ELPH-MCNC: 3.3 G/DL — SIGNIFICANT CHANGE UP (ref 3.3–5)
ALP SERPL-CCNC: 50 U/L — SIGNIFICANT CHANGE UP (ref 40–120)
ALT FLD-CCNC: 14 U/L — SIGNIFICANT CHANGE UP (ref 10–45)
ANION GAP SERPL CALC-SCNC: 11 MMOL/L — SIGNIFICANT CHANGE UP (ref 5–17)
AST SERPL-CCNC: 19 U/L — SIGNIFICANT CHANGE UP (ref 10–40)
BASOPHILS # BLD AUTO: 0 K/UL — SIGNIFICANT CHANGE UP (ref 0–0.2)
BASOPHILS NFR BLD AUTO: 0 % — SIGNIFICANT CHANGE UP (ref 0–2)
BILIRUB SERPL-MCNC: 0.2 MG/DL — SIGNIFICANT CHANGE UP (ref 0.2–1.2)
BUN SERPL-MCNC: 20 MG/DL — SIGNIFICANT CHANGE UP (ref 7–23)
CALCIUM SERPL-MCNC: 9 MG/DL — SIGNIFICANT CHANGE UP (ref 8.4–10.5)
CHLORIDE SERPL-SCNC: 108 MMOL/L — SIGNIFICANT CHANGE UP (ref 96–108)
CO2 SERPL-SCNC: 21 MMOL/L — LOW (ref 22–31)
CREAT SERPL-MCNC: 0.89 MG/DL — SIGNIFICANT CHANGE UP (ref 0.5–1.3)
EOSINOPHIL # BLD AUTO: 0 K/UL — SIGNIFICANT CHANGE UP (ref 0–0.5)
EOSINOPHIL NFR BLD AUTO: 0 % — SIGNIFICANT CHANGE UP (ref 0–6)
GLUCOSE SERPL-MCNC: 146 MG/DL — HIGH (ref 70–99)
HCT VFR BLD CALC: 29.4 % — LOW (ref 34.5–45)
HGB BLD-MCNC: 9.7 G/DL — LOW (ref 11.5–15.5)
LDH SERPL L TO P-CCNC: 202 U/L — SIGNIFICANT CHANGE UP (ref 50–242)
LYMPHOCYTES # BLD AUTO: 0.19 K/UL — LOW (ref 1–3.3)
LYMPHOCYTES # BLD AUTO: 5.3 % — LOW (ref 13–44)
MCHC RBC-ENTMCNC: 33 GM/DL — SIGNIFICANT CHANGE UP (ref 32–36)
MCHC RBC-ENTMCNC: 33.3 PG — SIGNIFICANT CHANGE UP (ref 27–34)
MCV RBC AUTO: 101 FL — HIGH (ref 80–100)
MONOCYTES # BLD AUTO: 0.07 K/UL — SIGNIFICANT CHANGE UP (ref 0–0.9)
MONOCYTES NFR BLD AUTO: 1.8 % — LOW (ref 2–14)
NEUTROPHILS # BLD AUTO: 3.4 K/UL — SIGNIFICANT CHANGE UP (ref 1.8–7.4)
NEUTROPHILS NFR BLD AUTO: 92.9 % — HIGH (ref 43–77)
PLATELET # BLD AUTO: 190 K/UL — SIGNIFICANT CHANGE UP (ref 150–400)
POTASSIUM SERPL-MCNC: 4.5 MMOL/L — SIGNIFICANT CHANGE UP (ref 3.5–5.3)
POTASSIUM SERPL-SCNC: 4.5 MMOL/L — SIGNIFICANT CHANGE UP (ref 3.5–5.3)
PROT SERPL-MCNC: 5.2 G/DL — LOW (ref 6–8.3)
RBC # BLD: 2.91 M/UL — LOW (ref 3.8–5.2)
RBC # FLD: 14.8 % — HIGH (ref 10.3–14.5)
SODIUM SERPL-SCNC: 140 MMOL/L — SIGNIFICANT CHANGE UP (ref 135–145)
URATE SERPL-MCNC: 1.8 MG/DL — LOW (ref 2.5–7)
URATE SERPL-MCNC: 1.8 MG/DL — LOW (ref 2.5–7)
WBC # BLD: 3.66 K/UL — LOW (ref 3.8–10.5)
WBC # FLD AUTO: 3.66 K/UL — LOW (ref 3.8–10.5)

## 2017-09-21 RX ORDER — THIAMINE MONONITRATE (VIT B1) 100 MG
100 TABLET ORAL DAILY
Qty: 0 | Refills: 0 | Status: DISCONTINUED | OUTPATIENT
Start: 2017-09-21 | End: 2017-09-24

## 2017-09-21 RX ADMIN — Medication 100 MILLIGRAM(S): at 18:03

## 2017-09-21 RX ADMIN — FAMOTIDINE 100 MILLIGRAM(S): 10 INJECTION INTRAVENOUS at 11:51

## 2017-09-21 RX ADMIN — Medication 50 MILLIGRAM(S): at 08:50

## 2017-09-21 RX ADMIN — ONDANSETRON 116 MILLIGRAM(S): 8 TABLET, FILM COATED ORAL at 12:30

## 2017-09-21 RX ADMIN — Medication 300 MILLIGRAM(S): at 11:52

## 2017-09-21 RX ADMIN — Medication 400 MILLIGRAM(S): at 18:32

## 2017-09-21 RX ADMIN — Medication 50 MILLIGRAM(S): at 17:53

## 2017-09-21 RX ADMIN — Medication 400 MILLIGRAM(S): at 08:54

## 2017-09-21 RX ADMIN — HEPARIN SODIUM 5000 UNIT(S): 5000 INJECTION INTRAVENOUS; SUBCUTANEOUS at 05:59

## 2017-09-21 RX ADMIN — HEPARIN SODIUM 5000 UNIT(S): 5000 INJECTION INTRAVENOUS; SUBCUTANEOUS at 17:53

## 2017-09-21 NOTE — ADVANCED PRACTICE NURSE CONSULT - ASSESSMENT
Cycle 5, day 2/4,Height and weight verified. Lab results as per Md gurpreet aware of same. Vital signs stable prior to chemotherapy, and  within accepectable parameters, see sunrise. Pt educated on the importance of saving urine, verbalizes good understanding. Pt education done re chemo regimen, drug effects and potential side effects,, pt states understanding. Reports that /she has been tolerating chemotherapy well without side effects. Pt withRCW mediport site  line, site free from signs and symptoms of infection, good blood return obtained. Pre- medicated withpepcid 20 mg ivss zofran 16 mg ivss etoposide 50 mg/m2=80 mg civi to infuse @ 23 ml/hr doxorubicin 10 mg/m2=16.1 mg civi @ 23 ml/h with vincristine 0.4 mg civi @ 23 ml/hr

## 2017-09-22 DIAGNOSIS — K59.00 CONSTIPATION, UNSPECIFIED: ICD-10-CM

## 2017-09-22 LAB
ALBUMIN SERPL ELPH-MCNC: 3.4 G/DL — SIGNIFICANT CHANGE UP (ref 3.3–5)
ALP SERPL-CCNC: 48 U/L — SIGNIFICANT CHANGE UP (ref 40–120)
ALT FLD-CCNC: 10 U/L — SIGNIFICANT CHANGE UP (ref 10–45)
ANION GAP SERPL CALC-SCNC: 15 MMOL/L — SIGNIFICANT CHANGE UP (ref 5–17)
AST SERPL-CCNC: 14 U/L — SIGNIFICANT CHANGE UP (ref 10–40)
BASOPHILS # BLD AUTO: 0 K/UL — SIGNIFICANT CHANGE UP (ref 0–0.2)
BASOPHILS NFR BLD AUTO: 0 % — SIGNIFICANT CHANGE UP (ref 0–2)
BILIRUB SERPL-MCNC: 0.2 MG/DL — SIGNIFICANT CHANGE UP (ref 0.2–1.2)
BUN SERPL-MCNC: 21 MG/DL — SIGNIFICANT CHANGE UP (ref 7–23)
CALCIUM SERPL-MCNC: 9.2 MG/DL — SIGNIFICANT CHANGE UP (ref 8.4–10.5)
CHLORIDE SERPL-SCNC: 109 MMOL/L — HIGH (ref 96–108)
CO2 SERPL-SCNC: 21 MMOL/L — LOW (ref 22–31)
CREAT SERPL-MCNC: 0.85 MG/DL — SIGNIFICANT CHANGE UP (ref 0.5–1.3)
EOSINOPHIL # BLD AUTO: 0 K/UL — SIGNIFICANT CHANGE UP (ref 0–0.5)
EOSINOPHIL NFR BLD AUTO: 0 % — SIGNIFICANT CHANGE UP (ref 0–6)
GLUCOSE SERPL-MCNC: 122 MG/DL — HIGH (ref 70–99)
HCT VFR BLD CALC: 28.2 % — LOW (ref 34.5–45)
HGB BLD-MCNC: 9.3 G/DL — LOW (ref 11.5–15.5)
IMM GRANULOCYTES NFR BLD AUTO: 0.2 % — SIGNIFICANT CHANGE UP (ref 0–1.5)
LDH SERPL L TO P-CCNC: 175 U/L — SIGNIFICANT CHANGE UP (ref 50–242)
LYMPHOCYTES # BLD AUTO: 0.45 K/UL — LOW (ref 1–3.3)
LYMPHOCYTES # BLD AUTO: 7.7 % — LOW (ref 13–44)
MCHC RBC-ENTMCNC: 33 GM/DL — SIGNIFICANT CHANGE UP (ref 32–36)
MCHC RBC-ENTMCNC: 33.7 PG — SIGNIFICANT CHANGE UP (ref 27–34)
MCV RBC AUTO: 102.2 FL — HIGH (ref 80–100)
MONOCYTES # BLD AUTO: 0.21 K/UL — SIGNIFICANT CHANGE UP (ref 0–0.9)
MONOCYTES NFR BLD AUTO: 3.6 % — SIGNIFICANT CHANGE UP (ref 2–14)
NEUTROPHILS # BLD AUTO: 5.21 K/UL — SIGNIFICANT CHANGE UP (ref 1.8–7.4)
NEUTROPHILS NFR BLD AUTO: 88.5 % — HIGH (ref 43–77)
PLATELET # BLD AUTO: 162 K/UL — SIGNIFICANT CHANGE UP (ref 150–400)
POTASSIUM SERPL-MCNC: 4.1 MMOL/L — SIGNIFICANT CHANGE UP (ref 3.5–5.3)
POTASSIUM SERPL-SCNC: 4.1 MMOL/L — SIGNIFICANT CHANGE UP (ref 3.5–5.3)
PROT SERPL-MCNC: 5.2 G/DL — LOW (ref 6–8.3)
RBC # BLD: 2.76 M/UL — LOW (ref 3.8–5.2)
RBC # FLD: 14.9 % — HIGH (ref 10.3–14.5)
SODIUM SERPL-SCNC: 145 MMOL/L — SIGNIFICANT CHANGE UP (ref 135–145)
URATE SERPL-MCNC: 1.9 MG/DL — LOW (ref 2.5–7)
VIT B12 SERPL-MCNC: >2000 PG/ML — HIGH (ref 243–894)
WBC # BLD: 5.88 K/UL — SIGNIFICANT CHANGE UP (ref 3.8–10.5)
WBC # FLD AUTO: 5.88 K/UL — SIGNIFICANT CHANGE UP (ref 3.8–10.5)

## 2017-09-22 RX ADMIN — Medication 50 MILLIGRAM(S): at 05:36

## 2017-09-22 RX ADMIN — Medication 300 MILLIGRAM(S): at 12:14

## 2017-09-22 RX ADMIN — Medication 1 TABLET(S): at 11:30

## 2017-09-22 RX ADMIN — ONDANSETRON 116 MILLIGRAM(S): 8 TABLET, FILM COATED ORAL at 12:15

## 2017-09-22 RX ADMIN — Medication 50 MILLIGRAM(S): at 19:41

## 2017-09-22 RX ADMIN — HEPARIN SODIUM 5000 UNIT(S): 5000 INJECTION INTRAVENOUS; SUBCUTANEOUS at 05:36

## 2017-09-22 RX ADMIN — Medication 100 MILLIGRAM(S): at 11:31

## 2017-09-22 RX ADMIN — Medication 1 TABLET(S): at 05:35

## 2017-09-22 RX ADMIN — FAMOTIDINE 100 MILLIGRAM(S): 10 INJECTION INTRAVENOUS at 11:31

## 2017-09-22 RX ADMIN — HEPARIN SODIUM 5000 UNIT(S): 5000 INJECTION INTRAVENOUS; SUBCUTANEOUS at 19:41

## 2017-09-22 NOTE — ADVANCED PRACTICE NURSE CONSULT - ASSESSMENT
Cycle 4 day 3/4 day 1/1,Height and weight verified. Lab results as per Md gurpreet aware of same. Vital signs stable prior to chemotherapy, and  within accepectable parameters, see sunrise. Pt educated on the importance of saving urine, verbalizes good understanding. Pt education  reinforced chemo regimen, drug effects and potential side effects, written materials provided, pt states understanding. Reports that /she has been tolerating chemotherapy well without side effects. Pt withRCW mediport  line, site free from signs and symptoms of infection, good blood return obtained. Pre- medicated with zofran 16 mg ivss pepcid 20 mg ivss etoposide 50 mg/m2=80 mg civi @ 25 ml/hr vincristine 0.4 mg/m2=0.64 mgcivi with adriamycin 10 mg/m2=16.1 mg civi @ 25 ml/hr

## 2017-09-23 LAB
ANION GAP SERPL CALC-SCNC: 10 MMOL/L — SIGNIFICANT CHANGE UP (ref 5–17)
BASOPHILS # BLD AUTO: 0 K/UL — SIGNIFICANT CHANGE UP (ref 0–0.2)
BASOPHILS NFR BLD AUTO: 0 % — SIGNIFICANT CHANGE UP (ref 0–2)
BUN SERPL-MCNC: 23 MG/DL — SIGNIFICANT CHANGE UP (ref 7–23)
CALCIUM SERPL-MCNC: 8.9 MG/DL — SIGNIFICANT CHANGE UP (ref 8.4–10.5)
CHLORIDE SERPL-SCNC: 107 MMOL/L — SIGNIFICANT CHANGE UP (ref 96–108)
CO2 SERPL-SCNC: 23 MMOL/L — SIGNIFICANT CHANGE UP (ref 22–31)
CREAT SERPL-MCNC: 0.82 MG/DL — SIGNIFICANT CHANGE UP (ref 0.5–1.3)
EOSINOPHIL # BLD AUTO: 0 K/UL — SIGNIFICANT CHANGE UP (ref 0–0.5)
EOSINOPHIL NFR BLD AUTO: 0 % — SIGNIFICANT CHANGE UP (ref 0–6)
GLUCOSE SERPL-MCNC: 113 MG/DL — HIGH (ref 70–99)
HCT VFR BLD CALC: 27.4 % — LOW (ref 34.5–45)
HGB BLD-MCNC: 9.2 G/DL — LOW (ref 11.5–15.5)
IMM GRANULOCYTES NFR BLD AUTO: 0.2 % — SIGNIFICANT CHANGE UP (ref 0–1.5)
LDH SERPL L TO P-CCNC: 179 U/L — SIGNIFICANT CHANGE UP (ref 50–242)
LYMPHOCYTES # BLD AUTO: 0.32 K/UL — LOW (ref 1–3.3)
LYMPHOCYTES # BLD AUTO: 6 % — LOW (ref 13–44)
MCHC RBC-ENTMCNC: 33.6 GM/DL — SIGNIFICANT CHANGE UP (ref 32–36)
MCHC RBC-ENTMCNC: 33.9 PG — SIGNIFICANT CHANGE UP (ref 27–34)
MCV RBC AUTO: 101.1 FL — HIGH (ref 80–100)
MONOCYTES # BLD AUTO: 0.14 K/UL — SIGNIFICANT CHANGE UP (ref 0–0.9)
MONOCYTES NFR BLD AUTO: 2.6 % — SIGNIFICANT CHANGE UP (ref 2–14)
NEUTROPHILS # BLD AUTO: 4.88 K/UL — SIGNIFICANT CHANGE UP (ref 1.8–7.4)
NEUTROPHILS NFR BLD AUTO: 91.2 % — HIGH (ref 43–77)
PLATELET # BLD AUTO: 149 K/UL — LOW (ref 150–400)
POTASSIUM SERPL-MCNC: 4.2 MMOL/L — SIGNIFICANT CHANGE UP (ref 3.5–5.3)
POTASSIUM SERPL-SCNC: 4.2 MMOL/L — SIGNIFICANT CHANGE UP (ref 3.5–5.3)
RBC # BLD: 2.71 M/UL — LOW (ref 3.8–5.2)
RBC # FLD: 14.5 % — SIGNIFICANT CHANGE UP (ref 10.3–14.5)
SODIUM SERPL-SCNC: 140 MMOL/L — SIGNIFICANT CHANGE UP (ref 135–145)
URATE SERPL-MCNC: 2 MG/DL — LOW (ref 2.5–7)
WBC # BLD: 5.35 K/UL — SIGNIFICANT CHANGE UP (ref 3.8–10.5)
WBC # FLD AUTO: 5.35 K/UL — SIGNIFICANT CHANGE UP (ref 3.8–10.5)

## 2017-09-23 RX ADMIN — Medication 100 MILLIGRAM(S): at 12:23

## 2017-09-23 RX ADMIN — SODIUM CHLORIDE 50 MILLILITER(S): 9 INJECTION, SOLUTION INTRAVENOUS at 12:23

## 2017-09-23 RX ADMIN — FAMOTIDINE 100 MILLIGRAM(S): 10 INJECTION INTRAVENOUS at 12:54

## 2017-09-23 RX ADMIN — Medication 50 MILLIGRAM(S): at 17:19

## 2017-09-23 RX ADMIN — Medication 300 MILLIGRAM(S): at 12:23

## 2017-09-23 RX ADMIN — HEPARIN SODIUM 5000 UNIT(S): 5000 INJECTION INTRAVENOUS; SUBCUTANEOUS at 17:18

## 2017-09-23 RX ADMIN — Medication 400 MILLIGRAM(S): at 17:19

## 2017-09-23 RX ADMIN — HEPARIN SODIUM 5000 UNIT(S): 5000 INJECTION INTRAVENOUS; SUBCUTANEOUS at 05:51

## 2017-09-23 RX ADMIN — Medication 400 MILLIGRAM(S): at 08:42

## 2017-09-23 RX ADMIN — ONDANSETRON 116 MILLIGRAM(S): 8 TABLET, FILM COATED ORAL at 13:34

## 2017-09-23 RX ADMIN — Medication 50 MILLIGRAM(S): at 05:51

## 2017-09-23 NOTE — ADVANCED PRACTICE NURSE CONSULT - ASSESSMENT
Cycle 1, day 4/5,Height and weight verified. Lab results as per Md gurpreet aware of same. Vital signs stable prior to chemotherapy, and  within accepectable parameters, see sunrise. Pt educated on the importance of saving urine, verbalizes good understanding. Pt education done re chemo regimen, drug effects and potential side effects, written materials provided, pt states understanding. Reports that /she has been tolerating chemotherapy well without side effects. Pt withRCW line, site free from signs and symptoms of infection, good blood return obtained. Pre- medicated with pepcid 20 mg zofran 16 mg etoposide 50 mg/m2 =80 mg civi @ 23 ml/hr vincristine 0.4 mg/m2=0.64 mg civi w/ adriamycin 10 mg/m2 =16.1 mg/m2civi @ 23 ml/hr

## 2017-09-24 ENCOUNTER — TRANSCRIPTION ENCOUNTER (OUTPATIENT)
Age: 77
End: 2017-09-24

## 2017-09-24 VITALS
SYSTOLIC BLOOD PRESSURE: 127 MMHG | TEMPERATURE: 98 F | RESPIRATION RATE: 16 BRPM | DIASTOLIC BLOOD PRESSURE: 75 MMHG | OXYGEN SATURATION: 99 % | HEART RATE: 55 BPM

## 2017-09-24 LAB
ANION GAP SERPL CALC-SCNC: 10 MMOL/L — SIGNIFICANT CHANGE UP (ref 5–17)
BUN SERPL-MCNC: 18 MG/DL — SIGNIFICANT CHANGE UP (ref 7–23)
CALCIUM SERPL-MCNC: 8.9 MG/DL — SIGNIFICANT CHANGE UP (ref 8.4–10.5)
CHLORIDE SERPL-SCNC: 107 MMOL/L — SIGNIFICANT CHANGE UP (ref 96–108)
CO2 SERPL-SCNC: 24 MMOL/L — SIGNIFICANT CHANGE UP (ref 22–31)
CREAT SERPL-MCNC: 0.79 MG/DL — SIGNIFICANT CHANGE UP (ref 0.5–1.3)
GLUCOSE SERPL-MCNC: 112 MG/DL — HIGH (ref 70–99)
HCT VFR BLD CALC: 26.7 % — LOW (ref 34.5–45)
HGB BLD-MCNC: 9.1 G/DL — LOW (ref 11.5–15.5)
MCHC RBC-ENTMCNC: 33.6 PG — SIGNIFICANT CHANGE UP (ref 27–34)
MCHC RBC-ENTMCNC: 34.1 GM/DL — SIGNIFICANT CHANGE UP (ref 32–36)
MCV RBC AUTO: 98.5 FL — SIGNIFICANT CHANGE UP (ref 80–100)
PLATELET # BLD AUTO: 135 K/UL — LOW (ref 150–400)
POTASSIUM SERPL-MCNC: 3.9 MMOL/L — SIGNIFICANT CHANGE UP (ref 3.5–5.3)
POTASSIUM SERPL-SCNC: 3.9 MMOL/L — SIGNIFICANT CHANGE UP (ref 3.5–5.3)
RBC # BLD: 2.71 M/UL — LOW (ref 3.8–5.2)
RBC # FLD: 14.1 % — SIGNIFICANT CHANGE UP (ref 10.3–14.5)
SODIUM SERPL-SCNC: 141 MMOL/L — SIGNIFICANT CHANGE UP (ref 135–145)
WBC # BLD: 5.09 K/UL — SIGNIFICANT CHANGE UP (ref 3.8–10.5)
WBC # FLD AUTO: 5.09 K/UL — SIGNIFICANT CHANGE UP (ref 3.8–10.5)

## 2017-09-24 RX ORDER — ACETAMINOPHEN 500 MG
2 TABLET ORAL
Qty: 0 | Refills: 0 | COMMUNITY
Start: 2017-09-24

## 2017-09-24 RX ORDER — CYCLOPHOSPHAMIDE 100 MG
805 VIAL (EA) INTRAVENOUS ONCE
Qty: 0 | Refills: 0 | Status: COMPLETED | OUTPATIENT
Start: 2017-09-24 | End: 2017-09-24

## 2017-09-24 RX ORDER — THIAMINE MONONITRATE (VIT B1) 100 MG
1 TABLET ORAL
Qty: 30 | Refills: 0 | OUTPATIENT
Start: 2017-09-24 | End: 2017-10-24

## 2017-09-24 RX ADMIN — HEPARIN SODIUM 5000 UNIT(S): 5000 INJECTION INTRAVENOUS; SUBCUTANEOUS at 05:41

## 2017-09-24 RX ADMIN — Medication 50 MILLIGRAM(S): at 05:41

## 2017-09-24 RX ADMIN — ONDANSETRON 116 MILLIGRAM(S): 8 TABLET, FILM COATED ORAL at 12:32

## 2017-09-24 RX ADMIN — FAMOTIDINE 100 MILLIGRAM(S): 10 INJECTION INTRAVENOUS at 12:29

## 2017-09-24 RX ADMIN — Medication 300 MILLIGRAM(S): at 12:29

## 2017-09-24 RX ADMIN — Medication 100 MILLIGRAM(S): at 12:28

## 2017-09-24 NOTE — ADVANCED PRACTICE NURSE CONSULT - RECOMMEDATIONS
Pt tolerated well report given to area nurse strict i/o monitor for side effects
monitor strict I&o monitor for side effects
will continue to monitor for any adverse effects , I/O N/V

## 2017-09-24 NOTE — DISCHARGE NOTE ADULT - MEDICATION SUMMARY - MEDICATIONS TO TAKE
I will START or STAY ON the medications listed below when I get home from the hospital:    acetaminophen 325 mg oral tablet  -- 2 tab(s) by mouth every 6 hours, As needed, Mild Pain (1 - 3)  -- Indication: For Pain    allopurinol 300 mg oral tablet  -- 1 tab(s) by mouth once a day  -- Indication: For Diffuse large B-cell lymphoma    acyclovir 400 mg oral tablet  -- 1 tab(s) by mouth   -- Indication: For Diffuse large B-cell lymphoma    sulfamethoxazole-trimethoprim 800 mg-160 mg oral tablet  -- 1 tab(s) by mouth   -- Indication: For Diffuse large B-cell lymphoma    thiamine 100 mg oral tablet  -- 1 tab(s) by mouth once a day  -- Indication: For Supplement

## 2017-09-24 NOTE — DISCHARGE NOTE ADULT - CARE PROVIDER_API CALL
Campos Ricks), Internal Medicine; Medical Oncology  [address] [place] [address]  Phone: [phone]  Fax: [phone]

## 2017-09-24 NOTE — DISCHARGE NOTE ADULT - PLAN OF CARE
Remission and resolution of symptoms. Take your medication as prescribed.   Follow up with your Oncologist  for monitoring and management, and to establish long term treatment goals.

## 2017-09-24 NOTE — DISCHARGE NOTE ADULT - CARE PLAN
Principal Discharge DX:	Diffuse large B-cell lymphoma, unspecified body region  Goal:	Remission and resolution of symptoms.  Instructions for follow-up, activity and diet:	Take your medication as prescribed.   Follow up with your Oncologist  for monitoring and management, and to establish long term treatment goals.  Secondary Diagnosis:	Factor V Leiden  Instructions for follow-up, activity and diet:	Take your medication as prescribed.   Follow up with your Oncologist  for monitoring and management, and to establish long term treatment goals.

## 2017-09-24 NOTE — DISCHARGE NOTE ADULT - ADDITIONAL INSTRUCTIONS
Follow up with Dr. Ricks tomorrow, 8/25 for Neulasta injection and monitoring.   Call to confirm appointment.

## 2017-09-24 NOTE — DISCHARGE NOTE ADULT - PATIENT PORTAL LINK FT
“You can access the [facility] Patient Portal, offered by [facility], by registering with the following website: [email or web address]”

## 2017-09-24 NOTE — PROGRESS NOTE ADULT - SUBJECTIVE AND OBJECTIVE BOX
Patient is a 76y old  Female who presents for next cycle R-EPOCH for treatment of High Grade B cell lymphoma (20 Sep 2017 13:13)       Pt is seen and examined  pt is awake and lying in bed  pt seems comfortable and denies any complaints at this time; only notes mild tingling in tips of fingers, chin; nodoes not interfere with ADLs      REVIEW OF SYSTEMS:    CONSTITUTIONAL: No weakness, fevers or chills  EYES/ENT: No visual changes;  No vertigo or throat pain   NECK: No pain or stiffness  RESPIRATORY: No cough, wheezing, hemoptysis; No shortness of breath  CARDIOVASCULAR: No chest pain or palpitations  GASTROINTESTINAL: No abdominal or epigastric pain. No nausea, vomiting, or hematemesis; No diarrhea or constipation. No melena or hematochezia.  GENITOURINARY: No dysuria, frequency or hematuria  NEUROLOGICAL: No numbness or weakness; mild tingling in fingertips, toes  SKIN: No itching, burning, rashes, or lesions     MEDICATIONS  (STANDING):  allopurinol 300 milliGRAM(s) Oral daily  dextrose 5% + sodium chloride 0.45%. 1000 milliLiter(s) (50 mL/Hr) IV Continuous <Continuous>  heparin  Injectable 5000 Unit(s) SubCutaneous every 12 hours  riTUXimab IVPB 600 milliGRAM(s) IV Intermittent once  famotidine  IVPB 20 milliGRAM(s) IV Intermittent daily  ondansetron  IVPB 16 milliGRAM(s) IV Intermittent daily  trimethoprim  160 mG/sulfamethoxazole 800 mG 1 Tablet(s) Oral <User Schedule>  trimethoprim  160 mG/sulfamethoxazole 800 mG 1 Tablet(s) Oral <User Schedule>  acyclovir   Tablet 400 milliGRAM(s) Oral <User Schedule>  predniSONE   Tablet 50 milliGRAM(s) Oral two times a day  etoposide IVPB 80 milliGRAM(s) IV Intermittent daily  DOXOrubicin IVPB w/vinCRIStine 16 milliGRAM(s) IV Intermittent daily    MEDICATIONS  (PRN):  acetaminophen   Tablet 650 milliGRAM(s) Oral every 6 hours PRN For Temp greater than 38.5 C (101.3 F)  acetaminophen   Tablet. 650 milliGRAM(s) Oral every 6 hours PRN Mild Pain (1 - 3)      Allergies    No Known Allergies    Intolerances        Vital Signs Last 24 Hrs  T(C): 36.7 (21 Sep 2017 07:17), Max: 37 (20 Sep 2017 10:40)  T(F): 98.1 (21 Sep 2017 07:17), Max: 98.6 (20 Sep 2017 10:40)  HR: 77 (21 Sep 2017 07:17) (64 - 82)  BP: 111/69 (21 Sep 2017 07:17) (99/63 - 112/71)  BP(mean): --  RR: 20 (21 Sep 2017 07:17) (18 - 20)  SpO2: 96% (21 Sep 2017 07:17) (96% - 100%)    PHYSICAL EXAM  General: adult in NAD  HEENT: clear oropharynx, anicteric sclera, pink conjunctiva  Neck: supple  CV: normal S1/S2 with no murmur rubs or gallops  Lungs: positive air movement b/l ant lungs,clear to auscultation, no wheezes, no rales  Abdomen: soft non-tender non-distended, no hepatosplenomegaly  Ext: no clubbing cyanosis or edema  Skin: no rashes and no petechiae  Neuro: alert and oriented X 4, no focal deficits  LABS:                          11.1   5.7   )-----------( 191      ( 20 Sep 2017 11:51 )             32.4         Mean Cell Volume : 106.0 fl  Mean Cell Hemoglobin : 36.1 pg  Mean Cell Hemoglobin Concentration : 34.1 gm/dL  Auto Neutrophil # : 4.6 K/uL  Auto Lymphocyte # : 0.9 K/uL  Auto Monocyte # : 0.2 K/uL  Auto Eosinophil # : 0.0 K/uL  Auto Basophil # : 0.0 K/uL  Auto Neutrophil % : 80.1 %  Auto Lymphocyte % : 15.8 %  Auto Monocyte % : 3.3 %  Auto Eosinophil % : 0.5 %  Auto Basophil % : 0.4 %      09-20    142  |  108  |  23  ----------------------------<  106<H>  4.6   |  24  |  0.89    Ca    9.1      20 Sep 2017 11:51    TPro  6.0  /  Alb  4.3  /  TBili  0.3  /  DBili  x   /  AST  19  /  ALT  18  /  AlkPhos  57  09-20            Assessment
Patient is a 76y old  Female who presents with a chief complaint of B cell lymphoma (20 Sep 2017 13:13)      SUBJECTIVE / OVERNIGHT EVENTS: c/o "pins and needles" fingers toes  Review of Systems  chest pain no  palpitations no  sob no  nausea no  headache no    MEDICATIONS  (STANDING):  allopurinol 300 milliGRAM(s) Oral daily  dextrose 5% + sodium chloride 0.45%. 1000 milliLiter(s) (50 mL/Hr) IV Continuous <Continuous>  heparin  Injectable 5000 Unit(s) SubCutaneous every 12 hours  riTUXimab IVPB 600 milliGRAM(s) IV Intermittent once  famotidine  IVPB 20 milliGRAM(s) IV Intermittent daily  ondansetron  IVPB 16 milliGRAM(s) IV Intermittent daily  trimethoprim  160 mG/sulfamethoxazole 800 mG 1 Tablet(s) Oral <User Schedule>  trimethoprim  160 mG/sulfamethoxazole 800 mG 1 Tablet(s) Oral <User Schedule>  acyclovir   Tablet 400 milliGRAM(s) Oral <User Schedule>  predniSONE   Tablet 50 milliGRAM(s) Oral two times a day  etoposide IVPB 80 milliGRAM(s) IV Intermittent daily  DOXOrubicin IVPB w/vinCRIStine 16 milliGRAM(s) IV Intermittent daily    MEDICATIONS  (PRN):  acetaminophen   Tablet 650 milliGRAM(s) Oral every 6 hours PRN For Temp greater than 38.5 C (101.3 F)  acetaminophen   Tablet. 650 milliGRAM(s) Oral every 6 hours PRN Mild Pain (1 - 3)          PHYSICAL EXAM:  GENERAL: NAD, well-developed  HEAD:  Atraumatic, Normocephalic  EYES: EOMI, PERRLA, conjunctiva and sclera clear  NECK: Supple, No JVD  CHEST/LUNG: Clear to auscultation bilaterally; No wheeze  HEART: Regular rate and rhythm; No murmurs, rubs, or gallops  ABDOMEN: Soft, Nontender, Nondistended; Bowel sounds present  EXTREMITIES:  2+ Peripheral Pulses, No clubbing, cyanosis, or edema  PSYCH: AAOx3  NEUROLOGY: non-focal  SKIN: No rashes or lesions    LABS:                        9.7    3.66  )-----------( 190      ( 21 Sep 2017 08:29 )             29.4     09-21    140  |  108  |  20  ----------------------------<  146<H>  4.5   |  21<L>  |  0.89    Ca    9.0      21 Sep 2017 07:55    TPro  5.2<L>  /  Alb  3.3  /  TBili  0.2  /  DBili  x   /  AST  19  /  ALT  14  /  AlkPhos  50  09-21              RADIOLOGY & ADDITIONAL TESTS:    Imaging Personally Reviewed:    Consultant(s) Notes Reviewed:      Care Discussed with Consultants/Other Providers:
75 yo  female admitted electively for chemotherapy. She has a high grade Bcell lymphoma and is to get her 5th cycle EPOCH-R.  she has tolerated the prior cells very well with minimal fatigue.    Pt is seen and examined  pt is awake and out of bed to chair  has mild numbness in  her toes and fingers  pt seems comfortable and denies any complaints at this time        MEDICATIONS  (STANDING):    MEDICATIONS  (PRN):      Allergies    No Known Allergies    Intolerances        Vital Signs Last 24 Hrs  T(C): 37 (20 Sep 2017 10:40), Max: 37 (20 Sep 2017 10:40)  T(F): 98.6 (20 Sep 2017 10:40), Max: 98.6 (20 Sep 2017 10:40)  HR: 82 (20 Sep 2017 10:40) (82 - 82)  BP: 107/66 (20 Sep 2017 10:40) (107/66 - 107/66)  BP(mean): --  RR: 18 (20 Sep 2017 10:40) (18 - 18)  SpO2: 100% (20 Sep 2017 10:40) (100% - 100%)    PHYSICAL EXAM  General: adult in NAD  HEENT: clear oropharynx, anicteric sclera, pink conjunctiva  Neck: supple  CV: normal S1/S2 with no murmur rubs or gallops  Lungs: positive air movement b/l ant lungs,clear to auscultation, no wheezes, no rales  Abdomen: soft non-tender non-distended, no hepatosplenomegaly  Ext: no clubbing cyanosis or edema  Skin: no rashes and no petechiae  Neuro: alert and oriented X 4, no focal deficits  LABS:  reviewed outpatient  lab
Patient is a 76y old  Female who presents for Cycle #5 of R-EPOCH for treatment of B cell lymphoma (20 Sep 2017 13:13)       Pt is seen and examined  pt is awake and lying in bed  pt seems comfortable and denies any complaints at this time; mild constipation      REVIEW OF SYSTEMS:    CONSTITUTIONAL: No weakness, fevers or chills  EYES/ENT: No visual changes;  No vertigo or throat pain   NECK: No pain or stiffness  RESPIRATORY: No cough, wheezing, hemoptysis; No shortness of breath  CARDIOVASCULAR: No chest pain or palpitations  GASTROINTESTINAL: No abdominal or epigastric pain. No nausea, vomiting, or hematemesis; No diarrhea or constipation. No melena or hematochezia.  GENITOURINARY: No dysuria, frequency or hematuria  NEUROLOGICAL: No numbness or weakness  SKIN: No itching, burning, rashes, or lesions     MEDICATIONS  (STANDING):  allopurinol 300 milliGRAM(s) Oral daily  dextrose 5% + sodium chloride 0.45%. 1000 milliLiter(s) (50 mL/Hr) IV Continuous <Continuous>  heparin  Injectable 5000 Unit(s) SubCutaneous every 12 hours  riTUXimab IVPB 600 milliGRAM(s) IV Intermittent once  famotidine  IVPB 20 milliGRAM(s) IV Intermittent daily  ondansetron  IVPB 16 milliGRAM(s) IV Intermittent daily  trimethoprim  160 mG/sulfamethoxazole 800 mG 1 Tablet(s) Oral <User Schedule>  trimethoprim  160 mG/sulfamethoxazole 800 mG 1 Tablet(s) Oral <User Schedule>  acyclovir   Tablet 400 milliGRAM(s) Oral <User Schedule>  predniSONE   Tablet 50 milliGRAM(s) Oral two times a day  etoposide IVPB 80 milliGRAM(s) IV Intermittent daily  DOXOrubicin IVPB w/vinCRIStine 16 milliGRAM(s) IV Intermittent daily  thiamine 100 milliGRAM(s) Oral daily    MEDICATIONS  (PRN):  acetaminophen   Tablet 650 milliGRAM(s) Oral every 6 hours PRN For Temp greater than 38.5 C (101.3 F)  acetaminophen   Tablet. 650 milliGRAM(s) Oral every 6 hours PRN Mild Pain (1 - 3)      Allergies    No Known Allergies    Intolerances        Vital Signs Last 24 Hrs  T(C): 36.4 (22 Sep 2017 07:48), Max: 36.7 (21 Sep 2017 15:09)  T(F): 97.6 (22 Sep 2017 07:48), Max: 98.1 (21 Sep 2017 15:09)  HR: 73 (22 Sep 2017 07:48) (67 - 73)  BP: 110/68 (22 Sep 2017 07:48) (107/65 - 118/74)  BP(mean): --  RR: 16 (22 Sep 2017 07:48) (16 - 18)  SpO2: 99% (22 Sep 2017 07:48) (96% - 99%)    PHYSICAL EXAM  General: adult in NAD  HEENT: clear oropharynx, anicteric sclera, pink conjunctiva  Neck: supple  CV: normal S1/S2 with no murmur rubs or gallops  Lungs: positive air movement b/l ant lungs,clear to auscultation, no wheezes, no rales  Abdomen: soft non-tender non-distended, no hepatosplenomegaly  Ext: no clubbing cyanosis or edema  Skin: no rashes and no petechiae  Neuro: alert and oriented X 4, no focal deficits    LABS:                          9.7    3.66  )-----------( 190      ( 21 Sep 2017 08:29 )             29.4       Mean Cell Volume : 101.0 fl  Mean Cell Hemoglobin : 33.3 pg  Mean Cell Hemoglobin Concentration : 33.0 gm/dL  Auto Neutrophil # : 3.40 K/uL  Auto Lymphocyte # : 0.19 K/uL  Auto Monocyte # : 0.07 K/uL  Auto Eosinophil # : 0.00 K/uL  Auto Basophil # : 0.00 K/uL  Auto Neutrophil % : 92.9 %  Auto Lymphocyte % : 5.3 %  Auto Monocyte % : 1.8 %  Auto Eosinophil % : 0.0 %  Auto Basophil % : 0.0 %          09-21    140  |  108  |  20  ----------------------------<  146<H>  4.5   |  21<L>  |  0.89    Ca    9.0      21 Sep 2017 07:55    TPro  5.2<L>  /  Alb  3.3  /  TBili  0.2  /  DBili  x   /  AST  19  /  ALT  14  /  AlkPhos  50  09-21            Assessment
Patient is a 76y old  Female who presents for Cycle #5 of R-EPOCH for treatment of B cell lymphoma (20 Sep 2017 13:13)       Pt is seen and examined.   pt is awake and sitting in bed  pt seems comfortable and denies any complaints at this time; Denies fevers, chills, cp sob, nausea/vomiting, abd pain, diarrhea. Had BM yesterday. Ambulatory, eating well. c/o mild swelling to bilateral LE.       REVIEW OF SYSTEMS:    CONSTITUTIONAL: No weakness, fevers or chills  EYES/ENT: No visual changes;  No vertigo or throat pain   NECK: No pain or stiffness  RESPIRATORY: No cough, wheezing, hemoptysis; No shortness of breath  CARDIOVASCULAR: No chest pain or palpitations  GASTROINTESTINAL: No abdominal or epigastric pain. No nausea, vomiting, or hematemesis; No diarrhea or constipation. No melena or hematochezia.  GENITOURINARY: No dysuria, frequency or hematuria  NEUROLOGICAL: No numbness or weakness  SKIN: No itching, burning, rashes, or lesions     MEDICATIONS  (STANDING):  allopurinol 300 milliGRAM(s) Oral daily  dextrose 5% + sodium chloride 0.45%. 1000 milliLiter(s) (50 mL/Hr) IV Continuous <Continuous>  heparin  Injectable 5000 Unit(s) SubCutaneous every 12 hours  riTUXimab IVPB 600 milliGRAM(s) IV Intermittent once  famotidine  IVPB 20 milliGRAM(s) IV Intermittent daily  ondansetron  IVPB 16 milliGRAM(s) IV Intermittent daily  trimethoprim  160 mG/sulfamethoxazole 800 mG 1 Tablet(s) Oral <User Schedule>  acyclovir   Tablet 400 milliGRAM(s) Oral <User Schedule>  predniSONE   Tablet 50 milliGRAM(s) Oral two times a day  etoposide IVPB 80 milliGRAM(s) IV Intermittent daily  DOXOrubicin IVPB w/vinCRIStine 16 milliGRAM(s) IV Intermittent daily  thiamine 100 milliGRAM(s) Oral daily    MEDICATIONS  (PRN):  acetaminophen   Tablet 650 milliGRAM(s) Oral every 6 hours PRN For Temp greater than 38.5 C (101.3 F)  acetaminophen   Tablet. 650 milliGRAM(s) Oral every 6 hours PRN Mild Pain (1 - 3)      Allergies    No Known Allergies    Intolerances      Vital Signs Last 24 Hrs  T(C): 36.8 (23 Sep 2017 08:15), Max: 36.9 (22 Sep 2017 15:22)  T(F): 98.3 (23 Sep 2017 08:15), Max: 98.4 (22 Sep 2017 15:22)  HR: 64 (23 Sep 2017 08:15) (64 - 73)  BP: 130/73 (23 Sep 2017 08:15) (112/68 - 130/73)  BP(mean): --  RR: 18 (23 Sep 2017 08:15) (18 - 18)  SpO2: 97% (23 Sep 2017 08:15) (95% - 97%)    PHYSICAL EXAM  General: adult in NAD  HEENT: clear oropharynx, anicteric sclera, pink conjunctiva  Neck: supple  CV: normal S1/S2 with no murmur rubs or gallops  Lungs: positive air movement b/l ant lungs,clear to auscultation, no wheezes, no rales  Abdomen: soft non-tender non-distended, no hepatosplenomegaly  Ext: no clubbing cyanosis or edema  Skin: no rashes and no petechiae  Neuro: alert and oriented X 4, no focal deficits    LABS:                          9.3    5.88  )-----------( 162      ( 22 Sep 2017 09:19 )             28.2     Mean Cell Volume : 102.2 fl  Mean Cell Hemoglobin : 33.7 pg  Mean Cell Hemoglobin Concentration : 33.0 gm/dL  Auto Neutrophil # : 5.21 K/uL  Auto Lymphocyte # : 0.45 K/uL  Auto Monocyte # : 0.21 K/uL  Auto Eosinophil # : 0.00 K/uL  Auto Basophil # : 0.00 K/uL  Auto Neutrophil % : 88.5 %  Auto Lymphocyte % : 7.7 %  Auto Monocyte % : 3.6 %  Auto Eosinophil % : 0.0 %  Auto Basophil % : 0.0 %    09-22    145  |  109<H>  |  21  ----------------------------<  122<H>  4.1   |  21<L>  |  0.85    Ca    9.2      22 Sep 2017 09:28    TPro  5.2<L>  /  Alb  3.4  /  TBili  0.2  /  DBili  x   /  AST  14  /  ALT  10  /  AlkPhos  48  09-22        Assessment
Patient is a 76y old  Female who presents for Cycle #5 of R-EPOCH for treatment of B cell lymphoma (20 Sep 2017 13:13)       Pt is seen and examined.   pt is awake and sitting in bed  pt seems comfortable and denies any complaints at this time; Denies fevers, chills, cp sob, nausea/vomiting, abd pain, diarrhea. Having regular BMs.  Ambulatory, eating well. c/o mild swelling to bilateral LE.       REVIEW OF SYSTEMS:    CONSTITUTIONAL: No weakness, fevers or chills  EYES/ENT: No visual changes;  No vertigo or throat pain   NECK: No pain or stiffness  RESPIRATORY: No cough, wheezing, hemoptysis; No shortness of breath  CARDIOVASCULAR: No chest pain or palpitations  GASTROINTESTINAL: No abdominal or epigastric pain. No nausea, vomiting, or hematemesis; No diarrhea or constipation. No melena or hematochezia.  GENITOURINARY: No dysuria, frequency or hematuria  NEUROLOGICAL: No numbness or weakness  SKIN: No itching, burning, rashes, or lesions     MEDICATIONS  (STANDING):  allopurinol 300 milliGRAM(s) Oral daily  dextrose 5% + sodium chloride 0.45%. 1000 milliLiter(s) (50 mL/Hr) IV Continuous <Continuous>  heparin  Injectable 5000 Unit(s) SubCutaneous every 12 hours  riTUXimab IVPB 600 milliGRAM(s) IV Intermittent once  famotidine  IVPB 20 milliGRAM(s) IV Intermittent daily  ondansetron  IVPB 16 milliGRAM(s) IV Intermittent daily  trimethoprim  160 mG/sulfamethoxazole 800 mG 1 Tablet(s) Oral <User Schedule>  acyclovir   Tablet 400 milliGRAM(s) Oral <User Schedule>  predniSONE   Tablet 50 milliGRAM(s) Oral two times a day  etoposide IVPB 80 milliGRAM(s) IV Intermittent daily  DOXOrubicin IVPB w/vinCRIStine 16 milliGRAM(s) IV Intermittent daily  thiamine 100 milliGRAM(s) Oral daily  cyclophosphamide IVPB 805 milliGRAM(s) IV Intermittent once    MEDICATIONS  (PRN):  acetaminophen   Tablet 650 milliGRAM(s) Oral every 6 hours PRN For Temp greater than 38.5 C (101.3 F)  acetaminophen   Tablet. 650 milliGRAM(s) Oral every 6 hours PRN Mild Pain (1 - 3)      Allergies    No Known Allergies    Intolerances    Vital Signs Last 24 Hrs  T(C): 36.7 (24 Sep 2017 08:24), Max: 37.1 (23 Sep 2017 17:15)  T(F): 98.1 (24 Sep 2017 08:24), Max: 98.7 (23 Sep 2017 17:15)  HR: 55 (24 Sep 2017 08:24) (55 - 61)  BP: 127/75 (24 Sep 2017 08:24) (121/75 - 127/75)  BP(mean): --  RR: 16 (24 Sep 2017 08:24) (16 - 16)  SpO2: 99% (24 Sep 2017 08:24) (94% - 99%)      PHYSICAL EXAM  General: adult in NAD  HEENT: clear oropharynx, anicteric sclera, pink conjunctiva  Neck: supple  CV: normal S1/S2 with no murmur rubs or gallops  Lungs: positive air movement b/l ant lungs,clear to auscultation, no wheezes, no rales  Abdomen: soft non-tender non-distended, no hepatosplenomegaly  Ext: no clubbing cyanosis or edema  Skin: no rashes and no petechiae  Neuro: alert and oriented X 4, no focal deficits    LABS:                          9.2    5.35  )-----------( 149      ( 23 Sep 2017 11:58 )             27.4     Mean Cell Volume : 101.1 fl  Mean Cell Hemoglobin : 33.9 pg  Mean Cell Hemoglobin Concentration : 33.6 gm/dL  Auto Neutrophil # : 4.88 K/uL  Auto Lymphocyte # : 0.32 K/uL  Auto Monocyte # : 0.14 K/uL  Auto Eosinophil # : 0.00 K/uL  Auto Basophil # : 0.00 K/uL  Auto Neutrophil % : 91.2 %  Auto Lymphocyte % : 6.0 %  Auto Monocyte % : 2.6 %  Auto Eosinophil % : 0.0 %  Auto Basophil % : 0.0 %    09-23    140  |  107  |  23  ----------------------------<  113<H>  4.2   |  23  |  0.82    Ca    8.9      23 Sep 2017 11:54    TPro  5.2<L>  /  Alb  3.4  /  TBili  0.2  /  DBili  x   /  AST  14  /  ALT  10  /  AlkPhos  48  09-22      Assessment
Patient is a 76y old  Female who presents with a chief complaint of B cell lymphoma (20 Sep 2017 13:13)      SUBJECTIVE / OVERNIGHT EVENTS: Comfortable without new complaints.   Review of Systems  chest pain no  palpitations no  sob no  nausea no  headache no    MEDICATIONS  (STANDING):  allopurinol 300 milliGRAM(s) Oral daily  dextrose 5% + sodium chloride 0.45%. 1000 milliLiter(s) (50 mL/Hr) IV Continuous <Continuous>  heparin  Injectable 5000 Unit(s) SubCutaneous every 12 hours  riTUXimab IVPB 600 milliGRAM(s) IV Intermittent once  famotidine  IVPB 20 milliGRAM(s) IV Intermittent daily  ondansetron  IVPB 16 milliGRAM(s) IV Intermittent daily  trimethoprim  160 mG/sulfamethoxazole 800 mG 1 Tablet(s) Oral <User Schedule>  acyclovir   Tablet 400 milliGRAM(s) Oral <User Schedule>  predniSONE   Tablet 50 milliGRAM(s) Oral two times a day  etoposide IVPB 80 milliGRAM(s) IV Intermittent daily  DOXOrubicin IVPB w/vinCRIStine 16 milliGRAM(s) IV Intermittent daily  thiamine 100 milliGRAM(s) Oral daily    MEDICATIONS  (PRN):  acetaminophen   Tablet 650 milliGRAM(s) Oral every 6 hours PRN For Temp greater than 38.5 C (101.3 F)  acetaminophen   Tablet. 650 milliGRAM(s) Oral every 6 hours PRN Mild Pain (1 - 3)          PHYSICAL EXAM:  GENERAL: NAD, well-developed  HEAD:  Atraumatic, Normocephalic  EYES: EOMI, PERRLA, conjunctiva and sclera clear  NECK: Supple, No JVD  CHEST/LUNG: Clear to auscultation bilaterally; No wheeze  HEART: Regular rate and rhythm; No murmurs, rubs, or gallops  ABDOMEN: Soft, Nontender, Nondistended; Bowel sounds present  EXTREMITIES:  2+ Peripheral Pulses, No clubbing, cyanosis, or edema  PSYCH: AAOx3  NEUROLOGY: non-focal  SKIN: No rashes or lesions    LABS:                        9.3    5.88  )-----------( 162      ( 22 Sep 2017 09:19 )             28.2     09-22    145  |  109<H>  |  21  ----------------------------<  122<H>  4.1   |  21<L>  |  0.85    Ca    9.2      22 Sep 2017 09:28    TPro  5.2<L>  /  Alb  3.4  /  TBili  0.2  /  DBili  x   /  AST  14  /  ALT  10  /  AlkPhos  48  09-22              RADIOLOGY & ADDITIONAL TESTS:    Imaging Personally Reviewed:    Consultant(s) Notes Reviewed:      Care Discussed with Consultants/Other Providers:
Patient is a 76y old  Female who presents with a chief complaint of B cell lymphoma (20 Sep 2017 13:13)      SUBJECTIVE / OVERNIGHT EVENTS: Comfortable without new complaints.   Review of Systems  chest pain no  palpitations no  sob no  nausea no  headache no    MEDICATIONS  (STANDING):  allopurinol 300 milliGRAM(s) Oral daily  dextrose 5% + sodium chloride 0.45%. 1000 milliLiter(s) (50 mL/Hr) IV Continuous <Continuous>  heparin  Injectable 5000 Unit(s) SubCutaneous every 12 hours  riTUXimab IVPB 600 milliGRAM(s) IV Intermittent once  famotidine  IVPB 20 milliGRAM(s) IV Intermittent daily  ondansetron  IVPB 16 milliGRAM(s) IV Intermittent daily  trimethoprim  160 mG/sulfamethoxazole 800 mG 1 Tablet(s) Oral <User Schedule>  trimethoprim  160 mG/sulfamethoxazole 800 mG 1 Tablet(s) Oral <User Schedule>  acyclovir   Tablet 400 milliGRAM(s) Oral <User Schedule>  predniSONE   Tablet 50 milliGRAM(s) Oral two times a day  etoposide IVPB 80 milliGRAM(s) IV Intermittent daily  DOXOrubicin IVPB w/vinCRIStine 16 milliGRAM(s) IV Intermittent daily  thiamine 100 milliGRAM(s) Oral daily    MEDICATIONS  (PRN):  acetaminophen   Tablet 650 milliGRAM(s) Oral every 6 hours PRN For Temp greater than 38.5 C (101.3 F)  acetaminophen   Tablet. 650 milliGRAM(s) Oral every 6 hours PRN Mild Pain (1 - 3)          PHYSICAL EXAM:  GENERAL: NAD, well-developed  HEAD:  Atraumatic, Normocephalic  EYES: EOMI, PERRLA, conjunctiva and sclera clear  NECK: Supple, No JVD  CHEST/LUNG: Clear to auscultation bilaterally; No wheeze  HEART: Regular rate and rhythm; No murmurs, rubs, or gallops  ABDOMEN: Soft, Nontender, Nondistended; Bowel sounds present  EXTREMITIES:  2+ Peripheral Pulses, No clubbing, cyanosis, or edema  PSYCH: AAOx3  NEUROLOGY: non-focal  SKIN: No rashes or lesions    LABS:                        9.3    5.88  )-----------( 162      ( 22 Sep 2017 09:19 )             28.2     09-21    140  |  108  |  20  ----------------------------<  146<H>  4.5   |  21<L>  |  0.89    Ca    9.0      21 Sep 2017 07:55    TPro  5.2<L>  /  Alb  3.3  /  TBili  0.2  /  DBili  x   /  AST  19  /  ALT  14  /  AlkPhos  50  09-21              RADIOLOGY & ADDITIONAL TESTS:    Imaging Personally Reviewed:    Consultant(s) Notes Reviewed:      Care Discussed with Consultants/Other Providers:
Patient is a 76y old  Female who presents with a chief complaint of B cell lymphoma (20 Sep 2017 13:13)      SUBJECTIVE / OVERNIGHT EVENTS: comfortable.  at bedside.  Review of Systems  chest pain no  palpitations no  sob no  nausea no  headache no    MEDICATIONS  (STANDING):  allopurinol 300 milliGRAM(s) Oral daily  dextrose 5% + sodium chloride 0.45%. 1000 milliLiter(s) (50 mL/Hr) IV Continuous <Continuous>  heparin  Injectable 5000 Unit(s) SubCutaneous every 12 hours  riTUXimab IVPB 600 milliGRAM(s) IV Intermittent once  trimethoprim  160 mG/sulfamethoxazole 800 mG 1 Tablet(s) Oral <User Schedule>  acyclovir   Tablet 400 milliGRAM(s) Oral <User Schedule>  predniSONE   Tablet 50 milliGRAM(s) Oral two times a day  etoposide IVPB 80 milliGRAM(s) IV Intermittent daily  DOXOrubicin IVPB w/vinCRIStine 16 milliGRAM(s) IV Intermittent daily  thiamine 100 milliGRAM(s) Oral daily  cyclophosphamide IVPB 805 milliGRAM(s) IV Intermittent once    MEDICATIONS  (PRN):  acetaminophen   Tablet 650 milliGRAM(s) Oral every 6 hours PRN For Temp greater than 38.5 C (101.3 F)  acetaminophen   Tablet. 650 milliGRAM(s) Oral every 6 hours PRN Mild Pain (1 - 3)          PHYSICAL EXAM:  GENERAL: NAD, well-developed  HEAD:  Atraumatic, Normocephalic  EYES: EOMI, PERRLA, conjunctiva and sclera clear  NECK: Supple, No JVD  CHEST/LUNG: Clear to auscultation bilaterally; No wheeze  HEART: Regular rate and rhythm; No murmurs, rubs, or gallops  ABDOMEN: Soft, Nontender, Nondistended; Bowel sounds present  EXTREMITIES:  2+ Peripheral Pulses, No clubbing, cyanosis, or edema  PSYCH: AAOx3  NEUROLOGY: non-focal  SKIN: No rashes or lesions    LABS:                        9.1    5.09  )-----------( 135      ( 24 Sep 2017 08:58 )             26.7     09-24    141  |  107  |  18  ----------------------------<  112<H>  3.9   |  24  |  0.79    Ca    8.9      24 Sep 2017 09:02                RADIOLOGY & ADDITIONAL TESTS:    Imaging Personally Reviewed:    Consultant(s) Notes Reviewed:      Care Discussed with Consultants/Other Providers:

## 2017-09-24 NOTE — PROGRESS NOTE ADULT - ATTENDING COMMENTS
Pt seen and examined. Agree with above.  Pt to be d/c'd today and f/u in office for Neulasta tomorrow.  F/u 1 wk with dr Rambo Ryan MD

## 2017-09-24 NOTE — PROGRESS NOTE ADULT - ASSESSMENT
76 f for chemoRx for Lymphoma.  IV hydration  oncology evaluation Dr. Garcia  follow cbc, lytes  prophylaxis  neuropathy- trial of Thiamine.  Sea Wolff MD pager [phone]
76 f for chemoRx for Lymphoma.  IV hydration  follow cbc, lytes  prophylaxis  neuropathy- trial of Thiamine.  ambulate  DCP in am after chemo if stable  Sea Wolff MD pager [phone]
76 f for chemoRx for Lymphoma.  IV hydration  oncology evaluation Dr. Ryan noted  follow cbc, lytes  prophylaxis  neuropathy- trial of Thiamine.  ambulate  Sea Wolff MD pager [phone]
76 f s/p chemoRx for Lymphoma.  neuropathy- trial of Thiamine.  ambulate  DC home. Follow with Oncology ,Dr. Ricks, in am for Neulasta. QA  Sea Wolff MD pager [phone]
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
High grade B-cell lymphoma, doing well for a 5th cycle  EPOCH-R. Tolerating well.  Outpatient lab reviewed. ok to proceed with planned  chemotherapy. Consent obtained.

## 2017-09-24 NOTE — PROGRESS NOTE ADULT - PROBLEM SELECTOR PLAN 2
- DVT prophylaxis with heparin

## 2017-09-24 NOTE — PROGRESS NOTE ADULT - PROBLEM SELECTOR PLAN 1
-- clin stable  -- proceed with chemoteharpy  -- antibiotic prophylaxis of PCP/Zoster
-- clin stable  -- proceed with chemotherapy D #3  -- antibiotic prophylaxis of PCP/Zoster
-- Clinically stable  -- Proceed with R-EPOCH chemotherapy D #4 - tolerating well  -- Continue antibiotic prophylaxis of PCP/Zoster  -- Plan for Neulasta as outpatient on Monday 9/25  -- Follow up with Dr Ricks as outpatient 1 week post discharge
-- Clinically stable  -- Proceed with R-EPOCH chemotherapy D #5 - tolerating well  -- Continue antibiotic prophylaxis of PCP/Zoster  -- Planning for discharge home later today after completion of chemotherpy  -- Plan for Neulasta as outpatient on Monday 9/25  -- Follow up with Dr Ricks as outpatient 1 week post discharge
For Rituxan today, start infusional adriamycin , Oncovin, etoposide today x 4 days.  Monitor CBC, BMP. uric acid. LDH  continue Allopurinol, Bactrim, acyclovir  IV and PO hydration  Antiemetics and premeds for Rituxan - Tylenol  benadryl

## 2017-09-24 NOTE — ADVANCED PRACTICE NURSE CONSULT - ASSESSMENT
pt awake and alert  @ bedside HT/WT verified labs as per sunrise-MD aware of the same.  V/S stable afebrile tolerating treatment well thus far; Reviewed chemotherapy teaching with pt-verbalized understanding of all education. Previously accessed right CW mediport in place no redness no swelling or pain positive brisk blood return noted IV hydration continued as ordered @50cc/hr pt voiding w/o difficulty reinforced strict I&O's; Pre medicated with pepcid 20mg IV and Zofran 16mg Iv as ordered pt awake and alert  @ bedside HT/WT verified labs as per sunrise-MD aware of the same.  V/S stable afebrile tolerating treatment well thus far; Reviewed chemotherapy teaching with pt-verbalized understanding of all education. Previously accessed right CW mediport in place no redness no swelling or pain positive brisk blood return noted IV hydration continued as ordered @50cc/hr pt voiding w/o difficulty reinforced strict I&O's; Pre medicated with pepcid 20mg IV and Zofran 16mg Iv as ordered followed by cytoxan 500mg/x9=189zz in 500cc NS over 1hr infused via pump without incidence-pt tolerated well no adverse reactions noted.area nurse given full report

## 2017-09-24 NOTE — PROGRESS NOTE ADULT - PROBLEM SELECTOR PROBLEM 1
Diffuse large B-cell lymphoma, unspecified body region

## 2017-09-24 NOTE — PROGRESS NOTE ADULT - PROVIDER SPECIALTY LIST ADULT
Heme/Onc
Internal Medicine
Heme/Onc

## 2017-09-24 NOTE — DISCHARGE NOTE ADULT - HOSPITAL COURSE
76y old  Female with h/o  of High Grade B cell lymphoma  and Factor V Leiden - not on treatment, admitted for treatment of B-cell lymphoma; now s/p chemo.   Pt c/o neuropathy- started on thiamine.  Now stable for discharge home. To follow up tomorrow with Oncologist, Dr. Ricks for continued care.

## 2017-09-25 LAB — VIT B1 SERPL-MCNC: 184.3 NMOL/L — SIGNIFICANT CHANGE UP (ref 66.5–200)

## 2017-10-19 PROCEDURE — 84550 ASSAY OF BLOOD/URIC ACID: CPT

## 2017-10-19 PROCEDURE — 84425 ASSAY OF VITAMIN B-1: CPT

## 2017-10-19 PROCEDURE — 80048 BASIC METABOLIC PNL TOTAL CA: CPT

## 2017-10-19 PROCEDURE — 83615 LACTATE (LD) (LDH) ENZYME: CPT

## 2017-10-19 PROCEDURE — 80053 COMPREHEN METABOLIC PANEL: CPT

## 2017-10-19 PROCEDURE — 82607 VITAMIN B-12: CPT

## 2017-10-19 PROCEDURE — 85027 COMPLETE CBC AUTOMATED: CPT

## 2017-10-25 ENCOUNTER — INPATIENT (INPATIENT)
Facility: HOSPITAL | Age: 77
LOS: 3 days | Discharge: HOME | DRG: 847 | End: 2017-10-29
Attending: INTERNAL MEDICINE | Admitting: INTERNAL MEDICINE
Payer: MEDICARE

## 2017-10-25 VITALS
RESPIRATION RATE: 18 BRPM | DIASTOLIC BLOOD PRESSURE: 75 MMHG | OXYGEN SATURATION: 98 % | SYSTOLIC BLOOD PRESSURE: 115 MMHG | TEMPERATURE: 98 F | HEIGHT: 66 IN | HEART RATE: 89 BPM | WEIGHT: 127.21 LBS

## 2017-10-25 DIAGNOSIS — Z86.018 PERSONAL HISTORY OF OTHER BENIGN NEOPLASM: Chronic | ICD-10-CM

## 2017-10-25 DIAGNOSIS — Z98.890 OTHER SPECIFIED POSTPROCEDURAL STATES: Chronic | ICD-10-CM

## 2017-10-25 DIAGNOSIS — C83.80 OTHER NON-FOLLICULAR LYMPHOMA, UNSPECIFIED SITE: ICD-10-CM

## 2017-10-25 DIAGNOSIS — D27.9 BENIGN NEOPLASM OF UNSPECIFIED OVARY: Chronic | ICD-10-CM

## 2017-10-25 LAB
ALBUMIN SERPL ELPH-MCNC: 3.9 G/DL — SIGNIFICANT CHANGE UP (ref 3.3–5)
ALP SERPL-CCNC: 56 U/L — SIGNIFICANT CHANGE UP (ref 40–120)
ALT FLD-CCNC: 18 U/L RC — SIGNIFICANT CHANGE UP (ref 10–45)
ANION GAP SERPL CALC-SCNC: 11 MMOL/L — SIGNIFICANT CHANGE UP (ref 5–17)
AST SERPL-CCNC: 22 U/L — SIGNIFICANT CHANGE UP (ref 10–40)
BILIRUB SERPL-MCNC: 0.3 MG/DL — SIGNIFICANT CHANGE UP (ref 0.2–1.2)
BUN SERPL-MCNC: 25 MG/DL — HIGH (ref 7–23)
CALCIUM SERPL-MCNC: 9 MG/DL — SIGNIFICANT CHANGE UP (ref 8.4–10.5)
CHLORIDE SERPL-SCNC: 105 MMOL/L — SIGNIFICANT CHANGE UP (ref 96–108)
CO2 SERPL-SCNC: 25 MMOL/L — SIGNIFICANT CHANGE UP (ref 22–31)
CREAT SERPL-MCNC: 0.85 MG/DL — SIGNIFICANT CHANGE UP (ref 0.5–1.3)
GLUCOSE SERPL-MCNC: 101 MG/DL — HIGH (ref 70–99)
HCT VFR BLD CALC: 30.4 % — LOW (ref 34.5–45)
HGB BLD-MCNC: 10.6 G/DL — LOW (ref 11.5–15.5)
LDH SERPL L TO P-CCNC: 238 U/L — SIGNIFICANT CHANGE UP (ref 50–242)
MCHC RBC-ENTMCNC: 34.9 GM/DL — SIGNIFICANT CHANGE UP (ref 32–36)
MCHC RBC-ENTMCNC: 36.7 PG — HIGH (ref 27–34)
MCV RBC AUTO: 105 FL — HIGH (ref 80–100)
PLATELET # BLD AUTO: 194 K/UL — SIGNIFICANT CHANGE UP (ref 150–400)
POTASSIUM SERPL-MCNC: 4.3 MMOL/L — SIGNIFICANT CHANGE UP (ref 3.5–5.3)
POTASSIUM SERPL-SCNC: 4.3 MMOL/L — SIGNIFICANT CHANGE UP (ref 3.5–5.3)
PROT SERPL-MCNC: 5.9 G/DL — LOW (ref 6–8.3)
RBC # BLD: 2.88 M/UL — LOW (ref 3.8–5.2)
RBC # FLD: 13.8 % — SIGNIFICANT CHANGE UP (ref 10.3–14.5)
SODIUM SERPL-SCNC: 141 MMOL/L — SIGNIFICANT CHANGE UP (ref 135–145)
URATE SERPL-MCNC: 1.9 MG/DL — LOW (ref 2.5–7)
WBC # BLD: 5.7 K/UL — SIGNIFICANT CHANGE UP (ref 3.8–10.5)
WBC # FLD AUTO: 5.7 K/UL — SIGNIFICANT CHANGE UP (ref 3.8–10.5)

## 2017-10-25 RX ORDER — ONDANSETRON 8 MG/1
16 TABLET, FILM COATED ORAL DAILY
Qty: 0 | Refills: 0 | Status: COMPLETED | OUTPATIENT
Start: 2017-10-25 | End: 2017-10-29

## 2017-10-25 RX ORDER — ACETAMINOPHEN 500 MG
650 TABLET ORAL EVERY 6 HOURS
Qty: 0 | Refills: 0 | Status: DISCONTINUED | OUTPATIENT
Start: 2017-10-25 | End: 2017-10-29

## 2017-10-25 RX ORDER — DIPHENHYDRAMINE HCL 50 MG
25 CAPSULE ORAL ONCE
Qty: 0 | Refills: 0 | Status: COMPLETED | OUTPATIENT
Start: 2017-10-25 | End: 2017-10-25

## 2017-10-25 RX ORDER — SODIUM CHLORIDE 9 MG/ML
1000 INJECTION, SOLUTION INTRAVENOUS
Qty: 0 | Refills: 0 | Status: DISCONTINUED | OUTPATIENT
Start: 2017-10-25 | End: 2017-10-29

## 2017-10-25 RX ORDER — THIAMINE MONONITRATE (VIT B1) 100 MG
100 TABLET ORAL DAILY
Qty: 0 | Refills: 0 | Status: DISCONTINUED | OUTPATIENT
Start: 2017-10-25 | End: 2017-10-29

## 2017-10-25 RX ORDER — ALLOPURINOL 300 MG
300 TABLET ORAL DAILY
Qty: 0 | Refills: 0 | Status: DISCONTINUED | OUTPATIENT
Start: 2017-10-25 | End: 2017-10-29

## 2017-10-25 RX ORDER — RITUXIMAB 10 MG/ML
611 INJECTION, SOLUTION INTRAVENOUS ONCE
Qty: 0 | Refills: 0 | Status: COMPLETED | OUTPATIENT
Start: 2017-10-25 | End: 2017-10-25

## 2017-10-25 RX ORDER — ETOPOSIDE 20 MG/ML
80 VIAL (ML) INTRAVENOUS DAILY
Qty: 0 | Refills: 0 | Status: DISCONTINUED | OUTPATIENT
Start: 2017-10-25 | End: 2017-10-29

## 2017-10-25 RX ORDER — ACETAMINOPHEN 500 MG
650 TABLET ORAL ONCE
Qty: 0 | Refills: 0 | Status: COMPLETED | OUTPATIENT
Start: 2017-10-25 | End: 2017-10-25

## 2017-10-25 RX ORDER — FAMOTIDINE 10 MG/ML
20 INJECTION INTRAVENOUS DAILY
Qty: 0 | Refills: 0 | Status: COMPLETED | OUTPATIENT
Start: 2017-10-25 | End: 2017-10-29

## 2017-10-25 RX ORDER — FOSAPREPITANT DIMEGLUMINE 150 MG/5ML
150 INJECTION, POWDER, LYOPHILIZED, FOR SOLUTION INTRAVENOUS ONCE
Qty: 0 | Refills: 0 | Status: COMPLETED | OUTPATIENT
Start: 2017-10-25 | End: 2017-10-25

## 2017-10-25 RX ORDER — DOXORUBICIN HYDROCHLORIDE 2 MG/ML
16 INJECTION, SOLUTION INTRAVENOUS DAILY
Qty: 0 | Refills: 0 | Status: DISCONTINUED | OUTPATIENT
Start: 2017-10-25 | End: 2017-10-29

## 2017-10-25 RX ORDER — ACYCLOVIR SODIUM 500 MG
400 VIAL (EA) INTRAVENOUS DAILY
Qty: 0 | Refills: 0 | Status: DISCONTINUED | OUTPATIENT
Start: 2017-10-25 | End: 2017-10-26

## 2017-10-25 RX ADMIN — Medication 50 MILLIGRAM(S): at 16:59

## 2017-10-25 RX ADMIN — Medication 25 MILLIGRAM(S): at 14:01

## 2017-10-25 RX ADMIN — FOSAPREPITANT DIMEGLUMINE 450 MILLIGRAM(S): 150 INJECTION, POWDER, LYOPHILIZED, FOR SOLUTION INTRAVENOUS at 15:48

## 2017-10-25 RX ADMIN — Medication 1 TABLET(S): at 16:59

## 2017-10-25 RX ADMIN — Medication 650 MILLIGRAM(S): at 14:00

## 2017-10-25 RX ADMIN — SODIUM CHLORIDE 50 MILLILITER(S): 9 INJECTION, SOLUTION INTRAVENOUS at 20:45

## 2017-10-25 RX ADMIN — FAMOTIDINE 100 MILLIGRAM(S): 10 INJECTION INTRAVENOUS at 14:01

## 2017-10-25 RX ADMIN — Medication 100 MILLIGRAM(S): at 16:59

## 2017-10-25 RX ADMIN — ONDANSETRON 116 MILLIGRAM(S): 8 TABLET, FILM COATED ORAL at 15:49

## 2017-10-25 NOTE — H&P ADULT - ASSESSMENT
76 f with Lymphoma for chemoRX  follow cbc, lytes  prophylaxis with bactrim, valtrex.  Further action as per clinical course   Sea Wolff MD pager [phone]

## 2017-10-25 NOTE — ADVANCED PRACTICE NURSE CONSULT - ASSESSMENT
Cycle 6 day 1/5,Height and weight verified. Lab results as per Md gurpreet aware of same. Vital signs stable prior to chemotherapy, and  within accepectable parameters, see sunrise. Pt educated on the importance of saving urine, verbalizes good understanding. Pt education done re chemo regimen, drug effects and potential side effects, , pt states understanding. Reports that /she has been tolerating chemotherapy well without side effects. Pt withRCW mediort  line, site free from signs and symptoms of infection, good blood return obtained. Pre- medicated with tylenol 650 mg po benadryl 25 mg ivss rituxan 375 mg/i0=876 mg ivss infused over 90 min w/o incident vincristine 0.4 mg/m2=0.65 mg w/ doxorubicin 10 mg/m2=16 mg civi @ 23 ml/hr etoposide 50 mg/m2=80 mg civi @ 23 ml/hr

## 2017-10-25 NOTE — H&P ADULT - NSHPLABSRESULTS_GEN_ALL_CORE
10.6   5.7   )-----------( 194      ( 25 Oct 2017 11:28 )             30.4       10-25    141  |  105  |  25<H>  ----------------------------<  101<H>  4.3   |  25  |  0.85    Ca    9.0      25 Oct 2017 11:28    TPro  5.9<L>  /  Alb  3.9  /  TBili  0.3  /  DBili  x   /  AST  22  /  ALT  18  /  AlkPhos  56  10-25                      Lactate Trend            CAPILLARY BLOOD GLUCOSE

## 2017-10-26 DIAGNOSIS — C83.30 DIFFUSE LARGE B-CELL LYMPHOMA, UNSPECIFIED SITE: ICD-10-CM

## 2017-10-26 LAB
ALBUMIN SERPL ELPH-MCNC: 3.6 G/DL — SIGNIFICANT CHANGE UP (ref 3.3–5)
ALP SERPL-CCNC: 48 U/L — SIGNIFICANT CHANGE UP (ref 40–120)
ALT FLD-CCNC: 17 U/L — SIGNIFICANT CHANGE UP (ref 10–45)
ANION GAP SERPL CALC-SCNC: 13 MMOL/L — SIGNIFICANT CHANGE UP (ref 5–17)
ANISOCYTOSIS BLD QL: SLIGHT — SIGNIFICANT CHANGE UP
AST SERPL-CCNC: 20 U/L — SIGNIFICANT CHANGE UP (ref 10–40)
BASOPHILS # BLD AUTO: 0 K/UL — SIGNIFICANT CHANGE UP (ref 0–0.2)
BASOPHILS NFR BLD AUTO: 0 % — SIGNIFICANT CHANGE UP (ref 0–2)
BILIRUB SERPL-MCNC: 0.2 MG/DL — SIGNIFICANT CHANGE UP (ref 0.2–1.2)
BUN SERPL-MCNC: 23 MG/DL — SIGNIFICANT CHANGE UP (ref 7–23)
CALCIUM SERPL-MCNC: 8.9 MG/DL — SIGNIFICANT CHANGE UP (ref 8.4–10.5)
CHLORIDE SERPL-SCNC: 106 MMOL/L — SIGNIFICANT CHANGE UP (ref 96–108)
CO2 SERPL-SCNC: 23 MMOL/L — SIGNIFICANT CHANGE UP (ref 22–31)
CREAT SERPL-MCNC: 0.81 MG/DL — SIGNIFICANT CHANGE UP (ref 0.5–1.3)
DACRYOCYTES BLD QL SMEAR: SLIGHT — SIGNIFICANT CHANGE UP
EOSINOPHIL # BLD AUTO: 0 K/UL — SIGNIFICANT CHANGE UP (ref 0–0.5)
EOSINOPHIL NFR BLD AUTO: 0 % — SIGNIFICANT CHANGE UP (ref 0–6)
GLUCOSE SERPL-MCNC: 145 MG/DL — HIGH (ref 70–99)
HCT VFR BLD CALC: 29.8 % — LOW (ref 34.5–45)
HGB BLD-MCNC: 9.9 G/DL — LOW (ref 11.5–15.5)
LDH SERPL L TO P-CCNC: 203 U/L — SIGNIFICANT CHANGE UP (ref 50–242)
LYMPHOCYTES # BLD AUTO: 0.37 K/UL — LOW (ref 1–3.3)
LYMPHOCYTES # BLD AUTO: 8.7 % — LOW (ref 13–44)
MACROCYTES BLD QL: SLIGHT — SIGNIFICANT CHANGE UP
MANUAL SMEAR VERIFICATION: SIGNIFICANT CHANGE UP
MCHC RBC-ENTMCNC: 33.2 GM/DL — SIGNIFICANT CHANGE UP (ref 32–36)
MCHC RBC-ENTMCNC: 34.1 PG — HIGH (ref 27–34)
MCV RBC AUTO: 102.8 FL — HIGH (ref 80–100)
MONOCYTES # BLD AUTO: 0.07 K/UL — SIGNIFICANT CHANGE UP (ref 0–0.9)
MONOCYTES NFR BLD AUTO: 1.7 % — LOW (ref 2–14)
NEUTROPHILS # BLD AUTO: 3.79 K/UL — SIGNIFICANT CHANGE UP (ref 1.8–7.4)
NEUTROPHILS NFR BLD AUTO: 89.6 % — HIGH (ref 43–77)
PLAT MORPH BLD: NORMAL — SIGNIFICANT CHANGE UP
PLATELET # BLD AUTO: 187 K/UL — SIGNIFICANT CHANGE UP (ref 150–400)
POIKILOCYTOSIS BLD QL AUTO: SLIGHT — SIGNIFICANT CHANGE UP
POLYCHROMASIA BLD QL SMEAR: SLIGHT — SIGNIFICANT CHANGE UP
POTASSIUM SERPL-MCNC: 4.7 MMOL/L — SIGNIFICANT CHANGE UP (ref 3.5–5.3)
POTASSIUM SERPL-SCNC: 4.7 MMOL/L — SIGNIFICANT CHANGE UP (ref 3.5–5.3)
PROT SERPL-MCNC: 5.5 G/DL — LOW (ref 6–8.3)
RBC # BLD: 2.9 M/UL — LOW (ref 3.8–5.2)
RBC # FLD: 14.8 % — HIGH (ref 10.3–14.5)
RBC BLD AUTO: ABNORMAL
SODIUM SERPL-SCNC: 142 MMOL/L — SIGNIFICANT CHANGE UP (ref 135–145)
URATE SERPL-MCNC: 1.8 MG/DL — LOW (ref 2.5–7)
WBC # BLD: 4.2 K/UL — SIGNIFICANT CHANGE UP (ref 3.8–10.5)
WBC # FLD AUTO: 4.2 K/UL — SIGNIFICANT CHANGE UP (ref 3.8–10.5)

## 2017-10-26 RX ORDER — ACYCLOVIR SODIUM 500 MG
400 VIAL (EA) INTRAVENOUS
Qty: 0 | Refills: 0 | Status: DISCONTINUED | OUTPATIENT
Start: 2017-10-26 | End: 2017-10-26

## 2017-10-26 RX ORDER — INFLUENZA VIRUS VACCINE 15; 15; 15; 15 UG/.5ML; UG/.5ML; UG/.5ML; UG/.5ML
0.5 SUSPENSION INTRAMUSCULAR ONCE
Qty: 0 | Refills: 0 | Status: DISCONTINUED | OUTPATIENT
Start: 2017-10-26 | End: 2017-10-29

## 2017-10-26 RX ORDER — ACYCLOVIR SODIUM 500 MG
400 VIAL (EA) INTRAVENOUS
Qty: 0 | Refills: 0 | Status: DISCONTINUED | OUTPATIENT
Start: 2017-10-26 | End: 2017-10-29

## 2017-10-26 RX ADMIN — Medication 50 MILLIGRAM(S): at 05:18

## 2017-10-26 RX ADMIN — Medication 400 MILLIGRAM(S): at 22:38

## 2017-10-26 RX ADMIN — Medication 400 MILLIGRAM(S): at 11:54

## 2017-10-26 RX ADMIN — Medication 300 MILLIGRAM(S): at 11:54

## 2017-10-26 RX ADMIN — Medication 50 MILLIGRAM(S): at 18:16

## 2017-10-26 RX ADMIN — ONDANSETRON 116 MILLIGRAM(S): 8 TABLET, FILM COATED ORAL at 15:27

## 2017-10-26 RX ADMIN — SODIUM CHLORIDE 50 MILLILITER(S): 9 INJECTION, SOLUTION INTRAVENOUS at 05:18

## 2017-10-26 RX ADMIN — FAMOTIDINE 100 MILLIGRAM(S): 10 INJECTION INTRAVENOUS at 15:32

## 2017-10-26 RX ADMIN — Medication 100 MILLIGRAM(S): at 14:51

## 2017-10-26 NOTE — PROGRESS NOTE ADULT - ASSESSMENT
76 f with Lymphoma for chemoRX  follow cbc, lytes  prophylaxis with bactrim, valtrex.  Oncology follow  Sea Wolff MD pager [phone]

## 2017-10-26 NOTE — PROGRESS NOTE ADULT - ASSESSMENT
Aggressive  'double hit' large celll lymphoma. Here for 6th cycle EPOCH-R chemotherapy. toleerating  treatment well.  Presently no  complaints.

## 2017-10-26 NOTE — PROGRESS NOTE ADULT - SUBJECTIVE AND OBJECTIVE BOX
Admitted for cycle 6 EPOCH-R chemotherapy  for aggressive non-hodgkin's lymphoma. Has been doing well. tolerating therapy well,  Pt is seen and examined  pt is awake and out of bed to chair  Only complain is some chronic numbness of the chin, stable  pt seems comfortable and denies any major complaints at this time        MEDICATIONS  (STANDING):  acyclovir   Tablet 400 milliGRAM(s) Oral daily  allopurinol 300 milliGRAM(s) Oral daily  dextrose 5% + sodium chloride 0.45%. 1000 milliLiter(s) (50 mL/Hr) IV Continuous <Continuous>  DOXOrubicin IVPB w/vinCRIStine 16 milliGRAM(s) IV Intermittent daily  etoposide IVPB 80 milliGRAM(s) IV Intermittent daily  famotidine  IVPB 20 milliGRAM(s) IV Intermittent daily  ondansetron  IVPB 16 milliGRAM(s) IV Intermittent daily  predniSONE   Tablet 50 milliGRAM(s) Oral two times a day  thiamine 100 milliGRAM(s) Oral daily  trimethoprim  160 mG/sulfamethoxazole 800 mG 1 Tablet(s) Oral <User Schedule>    MEDICATIONS  (PRN):  acetaminophen   Tablet 650 milliGRAM(s) Oral every 6 hours PRN Mild Pain (1 - 3)      Allergies    No Known Allergies    Intolerances        Vital Signs Last 24 Hrs  T(C): 36.5 (26 Oct 2017 07:22), Max: 36.7 (25 Oct 2017 10:18)  T(F): 97.7 (26 Oct 2017 07:22), Max: 98 (25 Oct 2017 10:18)  HR: 74 (26 Oct 2017 07:22) (65 - 89)  BP: 99/62 (26 Oct 2017 07:22) (99/62 - 115/75)  BP(mean): --  RR: 18 (26 Oct 2017 07:22) (18 - 18)  SpO2: 99% (26 Oct 2017 07:22) (96% - 99%)    PHYSICAL EXAM  General: adult in NAD  HEENT: clear oropharynx, anicteric sclera, pink conjunctiva  Neck: supple  CV: normal S1/S2 with no murmur rubs or gallops  Lungs: positive air movement b/l ant lungs,clear to auscultation, no wheezes, no rales  Abdomen: soft non-tender non-distended, no hepatosplenomegaly  Ext: no clubbing cyanosis or edema  Skin: no rashes and no petechiae  Neuro: alert and oriented X 4, no focal deficits  LABS:                          9.9    4.2   )-----------( 187      ( 26 Oct 2017 08:45 )             29.8         Mean Cell Volume : 102.8 fl  Mean Cell Hemoglobin : 34.1 pg  Mean Cell Hemoglobin Concentration : 33.2 gm/dL  Auto Neutrophil # : x  Auto Lymphocyte # : x  Auto Monocyte # : x  Auto Eosinophil # : x  Auto Basophil # : x  Auto Neutrophil % : x  Auto Lymphocyte % : x  Auto Monocyte % : x  Auto Eosinophil % : x  Auto Basophil % : x      10-26    142  |  106  |  23  ----------------------------<  145<H>  4.7   |  23  |  0.81    Ca    8.9      26 Oct 2017 08:32    TPro  5.5<L>  /  Alb  3.6  /  TBili  0.2  /  DBili  x   /  AST  20  /  ALT  17  /  AlkPhos  48  10-26          Lactate Dehydrogenase, Serum: 203 U/L (10-26 @ 08:32)  Lactate Dehydrogenase, Serum: 238 U/L (10-25 @ 13:21)                          BLOOD SMEAR INTERPRETATION:       RADIOLOGY & ADDITIONAL STUDIES:

## 2017-10-26 NOTE — PROGRESS NOTE ADULT - SUBJECTIVE AND OBJECTIVE BOX
Patient is a 76y old  Female who presents with a chief complaint of chin pain , chemoRx (25 Oct 2017 15:06)      SUBJECTIVE / OVERNIGHT EVENTS: Comfortable without new complaints. Chin discomfort.  Review of Systems  chest pain no  palpitations no  sob no  nausea no  headache no    MEDICATIONS  (STANDING):  acyclovir   Tablet 400 milliGRAM(s) Oral daily  allopurinol 300 milliGRAM(s) Oral daily  dextrose 5% + sodium chloride 0.45%. 1000 milliLiter(s) (50 mL/Hr) IV Continuous <Continuous>  DOXOrubicin IVPB w/vinCRIStine 16 milliGRAM(s) IV Intermittent daily  etoposide IVPB 80 milliGRAM(s) IV Intermittent daily  famotidine  IVPB 20 milliGRAM(s) IV Intermittent daily  influenza   Vaccine 0.5 milliLiter(s) IntraMuscular once  ondansetron  IVPB 16 milliGRAM(s) IV Intermittent daily  predniSONE   Tablet 50 milliGRAM(s) Oral two times a day  thiamine 100 milliGRAM(s) Oral daily  trimethoprim  160 mG/sulfamethoxazole 800 mG 1 Tablet(s) Oral <User Schedule>    MEDICATIONS  (PRN):  acetaminophen   Tablet 650 milliGRAM(s) Oral every 6 hours PRN Mild Pain (1 - 3)          PHYSICAL EXAM:  GENERAL: NAD, well-developed  HEAD:  Atraumatic, Normocephalic  EYES: EOMI, PERRLA, conjunctiva and sclera clear  NECK: Supple, No JVD  CHEST/LUNG: Clear to auscultation bilaterally; No wheeze  HEART: Regular rate and rhythm; No murmurs, rubs, or gallops  ABDOMEN: Soft, Nontender, Nondistended; Bowel sounds present  EXTREMITIES:  2+ Peripheral Pulses, No clubbing, cyanosis, or edema  PSYCH: AAOx3  NEUROLOGY: non-focal  SKIN: No rashes or lesions    LABS:                        9.9    4.2   )-----------( 187      ( 26 Oct 2017 08:45 )             29.8     10-26    142  |  106  |  23  ----------------------------<  145<H>  4.7   |  23  |  0.81    Ca    8.9      26 Oct 2017 08:32    TPro  5.5<L>  /  Alb  3.6  /  TBili  0.2  /  DBili  x   /  AST  20  /  ALT  17  /  AlkPhos  48  10-26              RADIOLOGY & ADDITIONAL TESTS:    Imaging Personally Reviewed:    Consultant(s) Notes Reviewed:      Care Discussed with Consultants/Other Providers:

## 2017-10-26 NOTE — ADVANCED PRACTICE NURSE CONSULT - ASSESSMENT
Cycle # day 2/5.. Lab results as per Md gurpreet aware of same. Vital signs stable prior to the start of chemotherapy, see sunrise.  Pt educated on the importance of saving urine, verbalize understanding of same.Pt education done re chemo regimen, drug effects and potential side effects, written material provided, pt states understanding. Pt reports the she has tolerated previous chemotherapy without side effects.Pt with mediport on rt chest wall , site free from signs and symptoms of infection, positive blood return obtained.completed day 1 of infusion  Pre-medicated with zofran 16mg ivss, pepcid 20mg ivss pt started on etoposide 50mg/m2=80mg CI at 23cc/hr and doxorubicin 10mg/m2=16mg CI mixed with vincristine 0.4mg/m2=0.65mg CI at 23cc/hr intiated at 1720 pt with no complaints ambulating in the hallway report given to the area nurse

## 2017-10-27 DIAGNOSIS — C85.90 NON-HODGKIN LYMPHOMA, UNSPECIFIED, UNSPECIFIED SITE: ICD-10-CM

## 2017-10-27 LAB
ALBUMIN SERPL ELPH-MCNC: 3.8 G/DL — SIGNIFICANT CHANGE UP (ref 3.3–5)
ALP SERPL-CCNC: 49 U/L — SIGNIFICANT CHANGE UP (ref 40–120)
ALT FLD-CCNC: 19 U/L — SIGNIFICANT CHANGE UP (ref 10–45)
ANION GAP SERPL CALC-SCNC: 12 MMOL/L — SIGNIFICANT CHANGE UP (ref 5–17)
AST SERPL-CCNC: 20 U/L — SIGNIFICANT CHANGE UP (ref 10–40)
BASOPHILS # BLD AUTO: 0 K/UL — SIGNIFICANT CHANGE UP (ref 0–0.2)
BASOPHILS NFR BLD AUTO: 0 % — SIGNIFICANT CHANGE UP (ref 0–2)
BILIRUB SERPL-MCNC: 0.2 MG/DL — SIGNIFICANT CHANGE UP (ref 0.2–1.2)
BUN SERPL-MCNC: 22 MG/DL — SIGNIFICANT CHANGE UP (ref 7–23)
CALCIUM SERPL-MCNC: 9 MG/DL — SIGNIFICANT CHANGE UP (ref 8.4–10.5)
CHLORIDE SERPL-SCNC: 107 MMOL/L — SIGNIFICANT CHANGE UP (ref 96–108)
CO2 SERPL-SCNC: 23 MMOL/L — SIGNIFICANT CHANGE UP (ref 22–31)
CREAT SERPL-MCNC: 0.89 MG/DL — SIGNIFICANT CHANGE UP (ref 0.5–1.3)
EOSINOPHIL # BLD AUTO: 0 K/UL — SIGNIFICANT CHANGE UP (ref 0–0.5)
EOSINOPHIL NFR BLD AUTO: 0 % — SIGNIFICANT CHANGE UP (ref 0–6)
GLUCOSE SERPL-MCNC: 137 MG/DL — HIGH (ref 70–99)
HCT VFR BLD CALC: 28.6 % — LOW (ref 34.5–45)
HGB BLD-MCNC: 9.7 G/DL — LOW (ref 11.5–15.5)
IMM GRANULOCYTES NFR BLD AUTO: 0 % — SIGNIFICANT CHANGE UP (ref 0–1.5)
LDH SERPL L TO P-CCNC: 201 U/L — SIGNIFICANT CHANGE UP (ref 50–242)
LYMPHOCYTES # BLD AUTO: 0.4 K/UL — LOW (ref 1–3.3)
LYMPHOCYTES # BLD AUTO: 5.5 % — LOW (ref 13–44)
MAGNESIUM SERPL-MCNC: 2 MG/DL — SIGNIFICANT CHANGE UP (ref 1.6–2.6)
MCHC RBC-ENTMCNC: 33.9 GM/DL — SIGNIFICANT CHANGE UP (ref 32–36)
MCHC RBC-ENTMCNC: 34.5 PG — HIGH (ref 27–34)
MCV RBC AUTO: 101.8 FL — HIGH (ref 80–100)
MONOCYTES # BLD AUTO: 0.21 K/UL — SIGNIFICANT CHANGE UP (ref 0–0.9)
MONOCYTES NFR BLD AUTO: 2.9 % — SIGNIFICANT CHANGE UP (ref 2–14)
NEUTROPHILS # BLD AUTO: 6.71 K/UL — SIGNIFICANT CHANGE UP (ref 1.8–7.4)
NEUTROPHILS NFR BLD AUTO: 91.6 % — HIGH (ref 43–77)
PHOSPHATE SERPL-MCNC: 3.3 MG/DL — SIGNIFICANT CHANGE UP (ref 2.5–4.5)
PLATELET # BLD AUTO: 194 K/UL — SIGNIFICANT CHANGE UP (ref 150–400)
POTASSIUM SERPL-MCNC: 4.4 MMOL/L — SIGNIFICANT CHANGE UP (ref 3.5–5.3)
POTASSIUM SERPL-SCNC: 4.4 MMOL/L — SIGNIFICANT CHANGE UP (ref 3.5–5.3)
PROT SERPL-MCNC: 5.5 G/DL — LOW (ref 6–8.3)
RBC # BLD: 2.81 M/UL — LOW (ref 3.8–5.2)
RBC # FLD: 14.7 % — HIGH (ref 10.3–14.5)
SODIUM SERPL-SCNC: 142 MMOL/L — SIGNIFICANT CHANGE UP (ref 135–145)
URATE SERPL-MCNC: 2.1 MG/DL — LOW (ref 2.5–7)
WBC # BLD: 7.32 K/UL — SIGNIFICANT CHANGE UP (ref 3.8–10.5)
WBC # FLD AUTO: 7.32 K/UL — SIGNIFICANT CHANGE UP (ref 3.8–10.5)

## 2017-10-27 RX ADMIN — Medication 50 MILLIGRAM(S): at 18:22

## 2017-10-27 RX ADMIN — ONDANSETRON 116 MILLIGRAM(S): 8 TABLET, FILM COATED ORAL at 14:26

## 2017-10-27 RX ADMIN — Medication 1 TABLET(S): at 06:05

## 2017-10-27 RX ADMIN — Medication 1 TABLET(S): at 18:22

## 2017-10-27 RX ADMIN — Medication 300 MILLIGRAM(S): at 12:27

## 2017-10-27 RX ADMIN — FAMOTIDINE 100 MILLIGRAM(S): 10 INJECTION INTRAVENOUS at 14:55

## 2017-10-27 RX ADMIN — Medication 100 MILLIGRAM(S): at 12:27

## 2017-10-27 RX ADMIN — Medication 50 MILLIGRAM(S): at 06:05

## 2017-10-27 NOTE — PROGRESS NOTE ADULT - SUBJECTIVE AND OBJECTIVE BOX
Patient is a 76y old  Female who presents with a chief complaint of chin pain , chemoRx (25 Oct 2017 15:06)      SUBJECTIVE / OVERNIGHT EVENTS: No new complaints.   Review of Systems  chest pain no  palpitations no  sob no  nausea no  headache no    MEDICATIONS  (STANDING):  acyclovir   Tablet 400 milliGRAM(s) Oral <User Schedule>  allopurinol 300 milliGRAM(s) Oral daily  dextrose 5% + sodium chloride 0.45%. 1000 milliLiter(s) (50 mL/Hr) IV Continuous <Continuous>  DOXOrubicin IVPB w/vinCRIStine 16 milliGRAM(s) IV Intermittent daily  etoposide IVPB 80 milliGRAM(s) IV Intermittent daily  famotidine  IVPB 20 milliGRAM(s) IV Intermittent daily  influenza   Vaccine 0.5 milliLiter(s) IntraMuscular once  ondansetron  IVPB 16 milliGRAM(s) IV Intermittent daily  predniSONE   Tablet 50 milliGRAM(s) Oral two times a day  thiamine 100 milliGRAM(s) Oral daily  trimethoprim  160 mG/sulfamethoxazole 800 mG 1 Tablet(s) Oral <User Schedule>    MEDICATIONS  (PRN):  acetaminophen   Tablet 650 milliGRAM(s) Oral every 6 hours PRN Mild Pain (1 - 3)          PHYSICAL EXAM:  GENERAL: NAD, well-developed  HEAD:  Atraumatic, Normocephalic  EYES: EOMI, PERRLA, conjunctiva and sclera clear  NECK: Supple, No JVD  CHEST/LUNG: Clear to auscultation bilaterally; No wheeze  HEART: Regular rate and rhythm; No murmurs, rubs, or gallops  ABDOMEN: Soft, Nontender, Nondistended; Bowel sounds present  EXTREMITIES:  2+ Peripheral Pulses, No clubbing, cyanosis, or edema  PSYCH: AAOx3  NEUROLOGY: non-focal  SKIN: No rashes or lesions    LABS:                        9.7    7.32  )-----------( 194      ( 27 Oct 2017 08:33 )             28.6     10-27    142  |  107  |  22  ----------------------------<  137<H>  4.4   |  23  |  0.89    Ca    9.0      27 Oct 2017 08:42  Phos  3.3     10-27  Mg     2.0     10-27    TPro  5.5<L>  /  Alb  3.8  /  TBili  0.2  /  DBili  x   /  AST  20  /  ALT  19  /  AlkPhos  49  10-27              RADIOLOGY & ADDITIONAL TESTS:    Imaging Personally Reviewed:    Consultant(s) Notes Reviewed:      Care Discussed with Consultants/Other Providers:

## 2017-10-27 NOTE — PROGRESS NOTE ADULT - ASSESSMENT
76 f with Lymphoma for chemoRX  follow cbc, lytes  prophylaxis with bactrim, valtrex.  Neurology evaluation for c/o chin pain during chemoRx  Oncology follow  Sea Wolff MD pager [phone]

## 2017-10-27 NOTE — PROGRESS NOTE ADULT - ASSESSMENT
Aggressive  'double hit' large celll lymphoma. Here for 6th cycle EPOCH-R chemotherapy tolerating  treatment well.  Has a chronic neuropathy affecting the chin. Likely being exacerbated by chemotherapy.  Will get neurology consult.

## 2017-10-28 LAB
ANION GAP SERPL CALC-SCNC: 13 MMOL/L — SIGNIFICANT CHANGE UP (ref 5–17)
BUN SERPL-MCNC: 22 MG/DL — SIGNIFICANT CHANGE UP (ref 7–23)
CALCIUM SERPL-MCNC: 9 MG/DL — SIGNIFICANT CHANGE UP (ref 8.4–10.5)
CHLORIDE SERPL-SCNC: 107 MMOL/L — SIGNIFICANT CHANGE UP (ref 96–108)
CO2 SERPL-SCNC: 21 MMOL/L — LOW (ref 22–31)
CREAT SERPL-MCNC: 0.89 MG/DL — SIGNIFICANT CHANGE UP (ref 0.5–1.3)
GLUCOSE SERPL-MCNC: 115 MG/DL — HIGH (ref 70–99)
HCT VFR BLD CALC: 28.5 % — LOW (ref 34.5–45)
HGB BLD-MCNC: 9.7 G/DL — LOW (ref 11.5–15.5)
LDH SERPL L TO P-CCNC: 187 U/L — SIGNIFICANT CHANGE UP (ref 50–242)
MCHC RBC-ENTMCNC: 34 GM/DL — SIGNIFICANT CHANGE UP (ref 32–36)
MCHC RBC-ENTMCNC: 34.5 PG — HIGH (ref 27–34)
MCV RBC AUTO: 101.4 FL — HIGH (ref 80–100)
PHOSPHATE SERPL-MCNC: 3 MG/DL — SIGNIFICANT CHANGE UP (ref 2.5–4.5)
PLATELET # BLD AUTO: 165 K/UL — SIGNIFICANT CHANGE UP (ref 150–400)
POTASSIUM SERPL-MCNC: 4.2 MMOL/L — SIGNIFICANT CHANGE UP (ref 3.5–5.3)
POTASSIUM SERPL-SCNC: 4.2 MMOL/L — SIGNIFICANT CHANGE UP (ref 3.5–5.3)
RBC # BLD: 2.81 M/UL — LOW (ref 3.8–5.2)
RBC # FLD: 14.6 % — HIGH (ref 10.3–14.5)
SODIUM SERPL-SCNC: 141 MMOL/L — SIGNIFICANT CHANGE UP (ref 135–145)
URATE SERPL-MCNC: 2.2 MG/DL — LOW (ref 2.5–7)
WBC # BLD: 6.52 K/UL — SIGNIFICANT CHANGE UP (ref 3.8–10.5)
WBC # FLD AUTO: 6.52 K/UL — SIGNIFICANT CHANGE UP (ref 3.8–10.5)

## 2017-10-28 RX ADMIN — Medication 400 MILLIGRAM(S): at 17:30

## 2017-10-28 RX ADMIN — Medication 50 MILLIGRAM(S): at 05:49

## 2017-10-28 RX ADMIN — Medication 300 MILLIGRAM(S): at 11:59

## 2017-10-28 RX ADMIN — SODIUM CHLORIDE 50 MILLILITER(S): 9 INJECTION, SOLUTION INTRAVENOUS at 22:18

## 2017-10-28 RX ADMIN — Medication 100 MILLIGRAM(S): at 11:59

## 2017-10-28 RX ADMIN — Medication 400 MILLIGRAM(S): at 05:49

## 2017-10-28 RX ADMIN — ONDANSETRON 116 MILLIGRAM(S): 8 TABLET, FILM COATED ORAL at 14:29

## 2017-10-28 RX ADMIN — Medication 50 MILLIGRAM(S): at 17:30

## 2017-10-28 RX ADMIN — FAMOTIDINE 100 MILLIGRAM(S): 10 INJECTION INTRAVENOUS at 14:28

## 2017-10-28 NOTE — ADVANCED PRACTICE NURSE CONSULT - ASSESSMENT
Cycle6  day 4/5,Height and weight verified. Lab results as per Md gurpreet aware of same. Vital signs stable prior to chemotherapy, and  within accepectable parameters, see sunrise. Pt educated on the importance of saving urine, verbalizes good understanding. Pt education done re chemo regimen, drug effects and potential side effects, , pt states understanding. Reports that she has been tolerating chemotherapy well without side effects. Pt withRCW mediport  line, site free from signs and symptoms of infection, good blood return obtained. Pre- medicated with zofran 16 mg ivss pepcid 20 mg ivss etoposide 50 mg/m2=80 mg civi @ 24 ml/hr vincristine 0.4 mg/m2=0.65 mg civiw/ doxorubicin 10 mg/m2= 16 mg civi @ 24 ml/hr

## 2017-10-28 NOTE — PROGRESS NOTE ADULT - SUBJECTIVE AND OBJECTIVE BOX
Patient is a 76y old  Female who presents with a chief complaint of chin pain , chemoRx (25 Oct 2017 15:06)      SUBJECTIVE / OVERNIGHT EVENTS: Comfortable without new complaints.   Review of Systems  chest pain no  palpitations no  sob no  nausea no  headache no    MEDICATIONS  (STANDING):  acyclovir   Tablet 400 milliGRAM(s) Oral <User Schedule>  allopurinol 300 milliGRAM(s) Oral daily  dextrose 5% + sodium chloride 0.45%. 1000 milliLiter(s) (50 mL/Hr) IV Continuous <Continuous>  DOXOrubicin IVPB w/vinCRIStine 16 milliGRAM(s) IV Intermittent daily  etoposide IVPB 80 milliGRAM(s) IV Intermittent daily  famotidine  IVPB 20 milliGRAM(s) IV Intermittent daily  influenza   Vaccine 0.5 milliLiter(s) IntraMuscular once  ondansetron  IVPB 16 milliGRAM(s) IV Intermittent daily  predniSONE   Tablet 50 milliGRAM(s) Oral two times a day  thiamine 100 milliGRAM(s) Oral daily  trimethoprim  160 mG/sulfamethoxazole 800 mG 1 Tablet(s) Oral <User Schedule>    MEDICATIONS  (PRN):  acetaminophen   Tablet 650 milliGRAM(s) Oral every 6 hours PRN Mild Pain (1 - 3)          PHYSICAL EXAM:  GENERAL: NAD, well-developed  HEAD:  Atraumatic, Normocephalic  EYES: EOMI, PERRLA, conjunctiva and sclera clear  NECK: Supple, No JVD  CHEST/LUNG: Clear to auscultation bilaterally; No wheeze  HEART: Regular rate and rhythm; No murmurs, rubs, or gallops  ABDOMEN: Soft, Nontender, Nondistended; Bowel sounds present  EXTREMITIES:  2+ Peripheral Pulses, No clubbing, cyanosis, or edema  PSYCH: AAOx3  NEUROLOGY: non-focal  SKIN: No rashes or lesions    LABS:                        9.7    6.52  )-----------( 165      ( 28 Oct 2017 08:24 )             28.5     10-28    141  |  107  |  22  ----------------------------<  115<H>  4.2   |  21<L>  |  0.89    Ca    9.0      28 Oct 2017 08:24  Phos  3.0     10-28  Mg     2.0     10-27    TPro  5.5<L>  /  Alb  3.8  /  TBili  0.2  /  DBili  x   /  AST  20  /  ALT  19  /  AlkPhos  49  10-27              RADIOLOGY & ADDITIONAL TESTS:    Imaging Personally Reviewed:    Consultant(s) Notes Reviewed:      Care Discussed with Consultants/Other Providers:

## 2017-10-28 NOTE — PROGRESS NOTE ADULT - SUBJECTIVE AND OBJECTIVE BOX
Pt is seen and examined  pt is awake and out of bed to chair  Tolerating therapy well.  Still complains of numbness of her chin.   pt seems comfortable and denies any other complaints at this time        MEDICATIONS  (STANDING):  acyclovir   Tablet 400 milliGRAM(s) Oral <User Schedule>  allopurinol 300 milliGRAM(s) Oral daily  dextrose 5% + sodium chloride 0.45%. 1000 milliLiter(s) (50 mL/Hr) IV Continuous <Continuous>  DOXOrubicin IVPB w/vinCRIStine 16 milliGRAM(s) IV Intermittent daily  etoposide IVPB 80 milliGRAM(s) IV Intermittent daily  famotidine  IVPB 20 milliGRAM(s) IV Intermittent daily  influenza   Vaccine 0.5 milliLiter(s) IntraMuscular once  ondansetron  IVPB 16 milliGRAM(s) IV Intermittent daily  predniSONE   Tablet 50 milliGRAM(s) Oral two times a day  thiamine 100 milliGRAM(s) Oral daily  trimethoprim  160 mG/sulfamethoxazole 800 mG 1 Tablet(s) Oral <User Schedule>    MEDICATIONS  (PRN):  acetaminophen   Tablet 650 milliGRAM(s) Oral every 6 hours PRN Mild Pain (1 - 3)      Allergies    No Known Allergies    Intolerances        Vital Signs Last 24 Hrs  T(C): 36.6 (28 Oct 2017 08:43), Max: 36.7 (27 Oct 2017 22:26)  T(F): 97.8 (28 Oct 2017 08:43), Max: 98.1 (27 Oct 2017 22:26)  HR: 63 (28 Oct 2017 08:43) (60 - 63)  BP: 119/72 (28 Oct 2017 08:43) (117/66 - 119/72)  BP(mean): --  RR: 18 (28 Oct 2017 08:43) (17 - 18)  SpO2: 95% (28 Oct 2017 08:43) (95% - 96%)    PHYSICAL EXAM  General: adult in NAD  HEENT:  anicteric sclera, pink conjunctiva  Neck: supple  CV: normal S1/S2 with no murmur rubs or gallops  Lungs: positive air movement b/l ant lungs,clear to auscultation, no wheezes, no rales  Abdomen: soft non-tender non-distended, no hepatosplenomegaly  Ext: no clubbing cyanosis or edema  Skin: no rashes and no petechiae  Neuro: alert and oriented X 4, no focal deficits  LABS:                          9.7    6.52  )-----------( 165      ( 28 Oct 2017 08:24 )             28.5         Mean Cell Volume : 101.4 fl  Mean Cell Hemoglobin : 34.5 pg  Mean Cell Hemoglobin Concentration : 34.0 gm/dL  Auto Neutrophil # : x  Auto Lymphocyte # : x  Auto Monocyte # : x  Auto Eosinophil # : x  Auto Basophil # : x  Auto Neutrophil % : x  Auto Lymphocyte % : x  Auto Monocyte % : x  Auto Eosinophil % : x  Auto Basophil % : x      10-28    141  |  107  |  22  ----------------------------<  115<H>  4.2   |  21<L>  |  0.89    Ca    9.0      28 Oct 2017 08:24  Phos  3.0     10-28  Mg     2.0     10-27    TPro  5.5<L>  /  Alb  3.8  /  TBili  0.2  /  DBili  x   /  AST  20  /  ALT  19  /  AlkPhos  49  10-27          Lactate Dehydrogenase, Serum: 187 U/L (10-28 @ 08:24)  Lactate Dehydrogenase, Serum: 201 U/L (10-27 @ 08:42)  Lactate Dehydrogenase, Serum: 203 U/L (10-26 @ 08:32)  Lactate Dehydrogenase, Serum: 238 U/L (10-25 @ 13:21)                          BLOOD SMEAR INTERPRETATION:       RADIOLOGY & ADDITIONAL STUDIES:

## 2017-10-28 NOTE — PROGRESS NOTE ADULT - ASSESSMENT
76 f with Lymphoma for chemoRX  follow cbc, lytes  prophylaxis with bactrim, valtrex.  Oncology follow  Sea Wolff MD pager [phone]

## 2017-10-29 ENCOUNTER — TRANSCRIPTION ENCOUNTER (OUTPATIENT)
Age: 77
End: 2017-10-29

## 2017-10-29 VITALS
TEMPERATURE: 98 F | SYSTOLIC BLOOD PRESSURE: 132 MMHG | OXYGEN SATURATION: 97 % | RESPIRATION RATE: 18 BRPM | DIASTOLIC BLOOD PRESSURE: 80 MMHG | HEART RATE: 59 BPM

## 2017-10-29 DIAGNOSIS — G62.0 DRUG-INDUCED POLYNEUROPATHY: ICD-10-CM

## 2017-10-29 DIAGNOSIS — R20.0 ANESTHESIA OF SKIN: ICD-10-CM

## 2017-10-29 LAB
ALBUMIN SERPL ELPH-MCNC: 3.4 G/DL — SIGNIFICANT CHANGE UP (ref 3.3–5)
ALP SERPL-CCNC: 48 U/L — SIGNIFICANT CHANGE UP (ref 40–120)
ALT FLD-CCNC: 20 U/L — SIGNIFICANT CHANGE UP (ref 10–45)
ANION GAP SERPL CALC-SCNC: 10 MMOL/L — SIGNIFICANT CHANGE UP (ref 5–17)
AST SERPL-CCNC: 19 U/L — SIGNIFICANT CHANGE UP (ref 10–40)
BASOPHILS # BLD AUTO: 0 K/UL — SIGNIFICANT CHANGE UP (ref 0–0.2)
BASOPHILS NFR BLD AUTO: 0 % — SIGNIFICANT CHANGE UP (ref 0–2)
BILIRUB SERPL-MCNC: 0.4 MG/DL — SIGNIFICANT CHANGE UP (ref 0.2–1.2)
BUN SERPL-MCNC: 22 MG/DL — SIGNIFICANT CHANGE UP (ref 7–23)
CALCIUM SERPL-MCNC: 8.7 MG/DL — SIGNIFICANT CHANGE UP (ref 8.4–10.5)
CHLORIDE SERPL-SCNC: 104 MMOL/L — SIGNIFICANT CHANGE UP (ref 96–108)
CO2 SERPL-SCNC: 24 MMOL/L — SIGNIFICANT CHANGE UP (ref 22–31)
CREAT SERPL-MCNC: 0.8 MG/DL — SIGNIFICANT CHANGE UP (ref 0.5–1.3)
EOSINOPHIL # BLD AUTO: 0 K/UL — SIGNIFICANT CHANGE UP (ref 0–0.5)
EOSINOPHIL NFR BLD AUTO: 0 % — SIGNIFICANT CHANGE UP (ref 0–6)
GLUCOSE SERPL-MCNC: 117 MG/DL — HIGH (ref 70–99)
HCT VFR BLD CALC: 27.9 % — LOW (ref 34.5–45)
HGB BLD-MCNC: 9.4 G/DL — LOW (ref 11.5–15.5)
IMM GRANULOCYTES NFR BLD AUTO: 0.2 % — SIGNIFICANT CHANGE UP (ref 0–1.5)
LYMPHOCYTES # BLD AUTO: 0.72 K/UL — LOW (ref 1–3.3)
LYMPHOCYTES # BLD AUTO: 11.7 % — LOW (ref 13–44)
MCHC RBC-ENTMCNC: 33.7 GM/DL — SIGNIFICANT CHANGE UP (ref 32–36)
MCHC RBC-ENTMCNC: 33.7 PG — SIGNIFICANT CHANGE UP (ref 27–34)
MCV RBC AUTO: 100 FL — SIGNIFICANT CHANGE UP (ref 80–100)
MONOCYTES # BLD AUTO: 0.11 K/UL — SIGNIFICANT CHANGE UP (ref 0–0.9)
MONOCYTES NFR BLD AUTO: 1.8 % — LOW (ref 2–14)
NEUTROPHILS # BLD AUTO: 5.3 K/UL — SIGNIFICANT CHANGE UP (ref 1.8–7.4)
NEUTROPHILS NFR BLD AUTO: 86.3 % — HIGH (ref 43–77)
PLATELET # BLD AUTO: 154 K/UL — SIGNIFICANT CHANGE UP (ref 150–400)
POTASSIUM SERPL-MCNC: 4 MMOL/L — SIGNIFICANT CHANGE UP (ref 3.5–5.3)
POTASSIUM SERPL-SCNC: 4 MMOL/L — SIGNIFICANT CHANGE UP (ref 3.5–5.3)
PROT SERPL-MCNC: 5.4 G/DL — LOW (ref 6–8.3)
RBC # BLD: 2.79 M/UL — LOW (ref 3.8–5.2)
RBC # FLD: 14.4 % — SIGNIFICANT CHANGE UP (ref 10.3–14.5)
SODIUM SERPL-SCNC: 138 MMOL/L — SIGNIFICANT CHANGE UP (ref 135–145)
WBC # BLD: 6.14 K/UL — SIGNIFICANT CHANGE UP (ref 3.8–10.5)
WBC # FLD AUTO: 6.14 K/UL — SIGNIFICANT CHANGE UP (ref 3.8–10.5)

## 2017-10-29 RX ORDER — CYCLOPHOSPHAMIDE 100 MG
815 VIAL (EA) INTRAVENOUS ONCE
Qty: 0 | Refills: 0 | Status: DISCONTINUED | OUTPATIENT
Start: 2017-10-29 | End: 2017-10-29

## 2017-10-29 RX ADMIN — Medication 50 MILLIGRAM(S): at 05:33

## 2017-10-29 RX ADMIN — Medication 50 MILLIGRAM(S): at 17:15

## 2017-10-29 RX ADMIN — Medication 100 MILLIGRAM(S): at 13:23

## 2017-10-29 RX ADMIN — ONDANSETRON 116 MILLIGRAM(S): 8 TABLET, FILM COATED ORAL at 16:12

## 2017-10-29 RX ADMIN — FAMOTIDINE 100 MILLIGRAM(S): 10 INJECTION INTRAVENOUS at 14:02

## 2017-10-29 RX ADMIN — Medication 300 MILLIGRAM(S): at 13:23

## 2017-10-29 NOTE — PROGRESS NOTE ADULT - ASSESSMENT
Aggressive  'double hit' large celll lymphoma. Here for 6th cycle EPOCH-R chemotherapy tolerating  treatment well.  Has a chronic neuropathy affecting the chin. Likely being exacerbated by chemotherapy.  Appreciate neurology consult.

## 2017-10-29 NOTE — DISCHARGE NOTE ADULT - CARE PLAN
Principal Discharge DX:	Diffuse large B-cell lymphoma, unspecified body region  Goal:	s/p chemotherapy  Instructions for follow-up, activity and diet:	Follow-up with Hematologist 10/30/17 for Neulasta injection  Secondary Diagnosis:	Neuropathy due to chemotherapeutic drug  Instructions for follow-up, activity and diet:	follow-up with Neurology---call for appointment

## 2017-10-29 NOTE — PROGRESS NOTE ADULT - SUBJECTIVE AND OBJECTIVE BOX
Pt is seen and examined  pt is awake and out of bed to chair  pt seems comfortable and denies any complaints at this time  Tolerating chemothrapy well.  To be discharged today after completing chemotherapy.      MEDICATIONS  (STANDING):  acyclovir   Tablet 400 milliGRAM(s) Oral <User Schedule>  allopurinol 300 milliGRAM(s) Oral daily  cyclophosphamide IVPB 815 milliGRAM(s) IV Intermittent once  dextrose 5% + sodium chloride 0.45%. 1000 milliLiter(s) (50 mL/Hr) IV Continuous <Continuous>  DOXOrubicin IVPB w/vinCRIStine 16 milliGRAM(s) IV Intermittent daily  etoposide IVPB 80 milliGRAM(s) IV Intermittent daily  famotidine  IVPB 20 milliGRAM(s) IV Intermittent daily  influenza   Vaccine 0.5 milliLiter(s) IntraMuscular once  ondansetron  IVPB 16 milliGRAM(s) IV Intermittent daily  predniSONE   Tablet 50 milliGRAM(s) Oral two times a day  thiamine 100 milliGRAM(s) Oral daily  trimethoprim  160 mG/sulfamethoxazole 800 mG 1 Tablet(s) Oral <User Schedule>    MEDICATIONS  (PRN):  acetaminophen   Tablet 650 milliGRAM(s) Oral every 6 hours PRN Mild Pain (1 - 3)      Allergies    No Known Allergies    Intolerances        Vital Signs Last 24 Hrs  T(C): 36.8 (29 Oct 2017 08:26), Max: 37.4 (28 Oct 2017 21:40)  T(F): 98.3 (29 Oct 2017 08:26), Max: 99.4 (28 Oct 2017 21:40)  HR: 59 (29 Oct 2017 08:26) (59 - 70)  BP: 132/80 (29 Oct 2017 08:26) (115/73 - 132/80)  BP(mean): --  RR: 18 (29 Oct 2017 08:26) (18 - 18)  SpO2: 97% (29 Oct 2017 08:26) (97% - 97%)    PHYSICAL EXAM  General: adult in NAD  HEENT:  anicteric sclera, pink conjunctiva  Neck: supple  Ext: no clubbing cyanosis or edema  Skin: no rashes and no petechiae  Neuro: alert and oriented X 4, no focal deficits  LABS:                          9.4    6.14  )-----------( 154      ( 29 Oct 2017 08:40 )             27.9         Mean Cell Volume : 100.0 fl  Mean Cell Hemoglobin : 33.7 pg  Mean Cell Hemoglobin Concentration : 33.7 gm/dL  Auto Neutrophil # : 5.30 K/uL  Auto Lymphocyte # : 0.72 K/uL  Auto Monocyte # : 0.11 K/uL  Auto Eosinophil # : 0.00 K/uL  Auto Basophil # : 0.00 K/uL  Auto Neutrophil % : 86.3 %  Auto Lymphocyte % : 11.7 %  Auto Monocyte % : 1.8 %  Auto Eosinophil % : 0.0 %  Auto Basophil % : 0.0 %      10-29    138  |  104  |  22  ----------------------------<  117<H>  4.0   |  24  |  0.80    Ca    8.7      29 Oct 2017 08:48  Phos  3.0     10-28    TPro  5.4<L>  /  Alb  3.4  /  TBili  0.4  /  DBili  x   /  AST  19  /  ALT  20  /  AlkPhos  48  10-29          Lactate Dehydrogenase, Serum: 187 U/L (10-28 @ 08:24)  Lactate Dehydrogenase, Serum: 201 U/L (10-27 @ 08:42)  Lactate Dehydrogenase, Serum: 203 U/L (10-26 @ 08:32)  Lactate Dehydrogenase, Serum: 238 U/L (10-25 @ 13:21)

## 2017-10-29 NOTE — DISCHARGE NOTE ADULT - PLAN OF CARE
s/p chemotherapy Follow-up with Hematologist 10/30/17 for Neulasta injection follow-up with Neurology---call for appointment

## 2017-10-29 NOTE — DISCHARGE NOTE ADULT - MEDICATION SUMMARY - MEDICATIONS TO TAKE
I will START or STAY ON the medications listed below when I get home from the hospital:    predniSONE 50 mg oral tablet  -- 1 tab(s) by mouth 2 times a day x 1 more dose tonight   -- Indication: For chemo regimen     acetaminophen 325 mg oral tablet  -- 2 tab(s) by mouth every 6 hours, As needed, Mild Pain (1 - 3)  -- Indication: For pain    allopurinol 300 mg oral tablet  -- 1 tab(s) by mouth once a day  -- Indication: For s/p chemo    acyclovir 400 mg oral tablet  -- 1 tab(s) by mouth   -- Indication: For s/p chemo     sulfamethoxazole-trimethoprim 800 mg-160 mg oral tablet  -- 1 tab(s) by mouth   -- Indication: For s/p chemo     thiamine 100 mg oral tablet  -- 1 tab(s) by mouth once a day  -- Indication: For supplement I will START or STAY ON the medications listed below when I get home from the hospital:    acetaminophen 325 mg oral tablet  -- 2 tab(s) by mouth every 6 hours, As needed, Mild Pain (1 - 3)  -- Indication: For pain    allopurinol 300 mg oral tablet  -- 1 tab(s) by mouth once a day  -- Indication: For s/p chemo    acyclovir 400 mg oral tablet  -- 1 tab(s) by mouth   -- Indication: For s/p chemo     sulfamethoxazole-trimethoprim 800 mg-160 mg oral tablet  -- 1 tab(s) by mouth   -- Indication: For s/p chemo     thiamine 100 mg oral tablet  -- 1 tab(s) by mouth once a day  -- Indication: For supplement

## 2017-10-29 NOTE — ADVANCED PRACTICE NURSE CONSULT - ASSESSMENT
Pt with day 1/5 tx, labs noted in sunrise, height, weight and bsa verified, okay to proceed with tx as per MD Garcia.  Pt with chest wall mediport, + blood return noted, no pain, redness or swelling noted at site.   Pt premedicated with pepcid 20 mg iv and zofran 16 mg iv.  Pt administered cytoxan 500 mg/m2 = 815 mg iv over 1 hour via locked pump.  Reinforced with pt chemo regimen and signs and symptoms to report on to area nurse and staff, pt verbalized understanding.  Pt to be discharged home this evening.  Emotional support provided.  Report given to area nurse.

## 2017-10-29 NOTE — PROGRESS NOTE ADULT - PROBLEM SELECTOR PLAN 1
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  The possibility that the symptoms are related to lymphoma seems less likely.  No new treatments offered at this time.  Outpatient follow up.
CBC, CMP , uric acid, LDH reviewed. OK to continue chemotherapy. Adriamycin, Etoposide, Oncovin today.  Continue IV hydration, Monitor above lab, antiemetics..
CBC, CMP , uric acid, LDH reviewed. OK to continue chemotherapy. Adriamycin, Etoposide, Oncovin today.  Continue IV hydration, Monitor above lab, antiemetics..
CBC, CMP , uric acid, LDH reviewed. OK to continue chemotherapy. Adriamycin, Etoposide, Oncovin today.  Continue IV hydration, Monitor above lab, antiemetics..  Will complete chemotherapy tomorrow.   Can be discharged tomorrow after  completion of chemotherapy.  Should return to  office on Monday for Neulasta.
CBC, CMP , uric acid, LDH reviewed. OK to continue chemotherapy. Complete Adriamycin, Etoposide, Oncovin today. Cytoxan today.  Continue IV hydration, Monitor above lab, antiemetics..  Will complete chemotherapy today.   Can be discharged today after  completion of chemotherapy.  Should return to  office on Monday for Neulasta.

## 2017-10-29 NOTE — DISCHARGE NOTE ADULT - CARE PROVIDER_API CALL
Asaf Garcia), Hematology; Internal Medicine; Medical Oncology  [address]  [address]  [place] [address]  Phone: [phone]  Fax: [phone]    Saqib Ferris), Neurology  [address] [place] [address]  Phone: [phone]  Fax: [phone]

## 2017-10-29 NOTE — PROGRESS NOTE ADULT - PROBLEM SELECTOR PROBLEM 2
Numbness
Neuropathy associated with lymphoma

## 2017-10-29 NOTE — PROGRESS NOTE ADULT - SUBJECTIVE AND OBJECTIVE BOX
HPI:  76 f with B cell lymphoma for chemoRx. (25 Oct 2017 15:06)  Patient reports numbness of feet and toes ongoing, but waxing and waning in severity, since initiation of chemotherapy in April 2017.  She has also experienced varying degrees of tingling in the chin, but there is known infiltration there by her report. These dysesthesias have improved during this hospitalization. She has only minor symptoms in the hands.        Review of Systems:  All review of systems negative, except for those marked:  General:		  Eyes:			  ENT:			  Pulmonary:		  Cardiac:		  Gastrointestinal:	  Renal/Urologic:	  Musculoskeletal		  Endocrine:		  Hematologic:	  Neurologic:		  Skin:			  Allergy/Immune	  Psychiatric:		    PAST MEDICAL & SURGICAL HISTORY:  Basal cell carcinoma: L cheek  Diffuse large B-cell lymphoma, unspecified body region  Dermoid cyst of ovary, right  Factor V Leiden  Lipoma: L posterior neck mass, 6/16  H/O exploratory laparotomy  H/O lipoma: s/p excision from left upper back  Benign ovarian tumor: removed 50 yrs ago    Past Hospitalizations:  MEDICATIONS  (STANDING):  acyclovir   Tablet 400 milliGRAM(s) Oral <User Schedule>  allopurinol 300 milliGRAM(s) Oral daily  dextrose 5% + sodium chloride 0.45%. 1000 milliLiter(s) (50 mL/Hr) IV Continuous <Continuous>  DOXOrubicin IVPB w/vinCRIStine 16 milliGRAM(s) IV Intermittent daily  etoposide IVPB 80 milliGRAM(s) IV Intermittent daily  famotidine  IVPB 20 milliGRAM(s) IV Intermittent daily  influenza   Vaccine 0.5 milliLiter(s) IntraMuscular once  ondansetron  IVPB 16 milliGRAM(s) IV Intermittent daily  predniSONE   Tablet 50 milliGRAM(s) Oral two times a day  thiamine 100 milliGRAM(s) Oral daily  trimethoprim  160 mG/sulfamethoxazole 800 mG 1 Tablet(s) Oral <User Schedule>    MEDICATIONS  (PRN):  acetaminophen   Tablet 650 milliGRAM(s) Oral every 6 hours PRN Mild Pain (1 - 3)    Allergies    No Known Allergies    Intolerances          FAMILY HISTORY:  Family history of factor V Leiden mutation (Sibling)  Family history of prostate cancer (Sibling): brother    [] Mental Retardation/Developmental Delay:  [] Cerebral Palsy:  [] Autism:  [] Deafness:  [] Speech Delay:  [] Blindness:  [] Learning Disorder:  [] Depression:  [] ADD  [] Bipolar Disorder:  [] Tourette  [] Obsessive Compulsive DIsorder:  [] Epilepsy  [] Psychosis  [] Other:    Social History      Vital Signs Last 24 Hrs  T(C): 37.4 (28 Oct 2017 21:40), Max: 37.4 (28 Oct 2017 21:40)  T(F): 99.4 (28 Oct 2017 21:40), Max: 99.4 (28 Oct 2017 21:40)  HR: 70 (28 Oct 2017 21:40) (63 - 70)  BP: 128/75 (28 Oct 2017 21:40) (115/73 - 128/75)  BP(mean): --  RR: 18 (28 Oct 2017 21:40) (18 - 18)  SpO2: 97% (28 Oct 2017 21:40) (95% - 97%)  Daily     Daily       GENERAL PHYSICAL EXAM  All physical exam findings normal, except for those marked:  General:	well nourished, not acutely or chronically ill-appearing  HEENT:	normocephalic, atraumatic, clear conjunctiva, external ear normal, TM clear, nasal mucosa normal, oral pharynx clear  Neck:          supple, full range of motion, no nuchal rigidity  Extremities:	no joint swelling, erythema, tenderness; normal ROM, no contractures  Skin:		no rash    NEUROLOGIC EXAM  Mental Status:     Oriented to time/place/person; Good eye contact ; follow simple commands ;  Age appropriate language  and fund of  knowledge.  Cranial Nerves:   PERRL, EOMI, no facial asymmetry , V1-V3 intact , symmetric palate, tongue midline.   Eyes:			Normal: optic discs   Visual Fields:		Full visual field  Muscle Strength:	 Full strength 5/5, proximal and distal,  upper and lower extremities  Muscle Tone:	Normal tone  Deep Tendon Reflexes:         Reduced diffusely.  Plantar Response:	Plantar reflexes flexion bilaterally  Sensation:		Intact to pain, light touch, temperature and vibration throughout.  Coordination/	No dysmetria in finger to nose test bilaterally  Cerebellum	  Tandem Gait/Romberg	Gait NT    Lab Results:                        9.7    6.52  )-----------( 165      ( 28 Oct 2017 08:24 )             28.5     10-28    141  |  107  |  22  ----------------------------<  115<H>  4.2   |  21<L>  |  0.89    Ca    9.0      28 Oct 2017 08:24  Phos  3.0     10-28  Mg     2.0     10-27    TPro  5.5<L>  /  Alb  3.8  /  TBili  0.2  /  DBili  x   /  AST  20  /  ALT  19  /  AlkPhos  49  10-27    LIVER FUNCTIONS - ( 27 Oct 2017 08:42 )  Alb: 3.8 g/dL / Pro: 5.5 g/dL / ALK PHOS: 49 U/L / ALT: 19 U/L / AST: 20 U/L / GGT: x               EEG Results: N/A

## 2017-10-29 NOTE — DISCHARGE NOTE ADULT - PATIENT PORTAL LINK FT
“You can access the Follow[employer] Patient Portal, offered by [facility], by registering with the following website: [email or web address]”

## 2017-10-29 NOTE — DISCHARGE NOTE ADULT - HOSPITAL COURSE
75 y/o female with Aggressive  'double hit' large cell lymphoma. Here for 6th cycle EPOCH-R chemotherapy tolerating  treatment well. Has a chronic neuropathy affecting the chin --Neurology consult called  Likely being exacerbated by chemotherapy--out patient f/u with Neurology. Patient will f/u with Hematologist for Neulasta injection.

## 2017-10-29 NOTE — PROGRESS NOTE ADULT - ASSESSMENT
76 f with Lymphoma for chemoRX  follow cbc, lytes  prophylaxis with bactrim, valtrex.  Oncology follow in 3 days  DC home. QA  Sea Wolff MD pager [phone]

## 2017-10-29 NOTE — PROGRESS NOTE ADULT - SUBJECTIVE AND OBJECTIVE BOX
Patient is a 76y old  Female who presents with a chief complaint of chin pain , chemoRx (29 Oct 2017 11:30)      SUBJECTIVE / OVERNIGHT EVENTS: Comfortable without new complaints.   Review of Systems  chest pain no  palpitations no  sob no  nausea no  headache no    MEDICATIONS  (STANDING):  acyclovir   Tablet 400 milliGRAM(s) Oral <User Schedule>  allopurinol 300 milliGRAM(s) Oral daily  cyclophosphamide IVPB 815 milliGRAM(s) IV Intermittent once  dextrose 5% + sodium chloride 0.45%. 1000 milliLiter(s) (50 mL/Hr) IV Continuous <Continuous>  DOXOrubicin IVPB w/vinCRIStine 16 milliGRAM(s) IV Intermittent daily  etoposide IVPB 80 milliGRAM(s) IV Intermittent daily  influenza   Vaccine 0.5 milliLiter(s) IntraMuscular once  predniSONE   Tablet 50 milliGRAM(s) Oral two times a day  thiamine 100 milliGRAM(s) Oral daily  trimethoprim  160 mG/sulfamethoxazole 800 mG 1 Tablet(s) Oral <User Schedule>    MEDICATIONS  (PRN):  acetaminophen   Tablet 650 milliGRAM(s) Oral every 6 hours PRN Mild Pain (1 - 3)          PHYSICAL EXAM:  GENERAL: NAD, well-developed  HEAD:  Atraumatic, Normocephalic  EYES: EOMI, PERRLA, conjunctiva and sclera clear  NECK: Supple, No JVD  CHEST/LUNG: Clear to auscultation bilaterally; No wheeze  HEART: Regular rate and rhythm; No murmurs, rubs, or gallops  ABDOMEN: Soft, Nontender, Nondistended; Bowel sounds present  EXTREMITIES:  2+ Peripheral Pulses, No clubbing, cyanosis, or edema  PSYCH: AAOx3  NEUROLOGY: non-focal  SKIN: No rashes or lesions    LABS:                        9.4    6.14  )-----------( 154      ( 29 Oct 2017 08:40 )             27.9     10-29    138  |  104  |  22  ----------------------------<  117<H>  4.0   |  24  |  0.80    Ca    8.7      29 Oct 2017 08:48  Phos  3.0     10-28    TPro  5.4<L>  /  Alb  3.4  /  TBili  0.4  /  DBili  x   /  AST  19  /  ALT  20  /  AlkPhos  48  10-29              RADIOLOGY & ADDITIONAL TESTS:    Imaging Personally Reviewed:    Consultant(s) Notes Reviewed:      Care Discussed with Consultants/Other Providers:

## 2017-10-29 NOTE — PROGRESS NOTE ADULT - PROBLEM SELECTOR PROBLEM 1
Neuropathy due to chemotherapeutic drug
Diffuse large B-cell lymphoma, unspecified body region

## 2017-11-22 ENCOUNTER — APPOINTMENT (OUTPATIENT)
Dept: NUCLEAR MEDICINE | Facility: IMAGING CENTER | Age: 77
End: 2017-11-22
Payer: MEDICARE

## 2017-11-22 ENCOUNTER — OUTPATIENT (OUTPATIENT)
Dept: OUTPATIENT SERVICES | Facility: HOSPITAL | Age: 77
LOS: 1 days | End: 2017-11-22
Payer: MEDICARE

## 2017-11-22 DIAGNOSIS — Z86.018 PERSONAL HISTORY OF OTHER BENIGN NEOPLASM: Chronic | ICD-10-CM

## 2017-11-22 DIAGNOSIS — Z00.8 ENCOUNTER FOR OTHER GENERAL EXAMINATION: ICD-10-CM

## 2017-11-22 DIAGNOSIS — D27.9 BENIGN NEOPLASM OF UNSPECIFIED OVARY: Chronic | ICD-10-CM

## 2017-11-22 DIAGNOSIS — Z98.890 OTHER SPECIFIED POSTPROCEDURAL STATES: Chronic | ICD-10-CM

## 2017-11-22 PROCEDURE — 78815 PET IMAGE W/CT SKULL-THIGH: CPT | Mod: 26,PS

## 2017-11-22 PROCEDURE — 78815 PET IMAGE W/CT SKULL-THIGH: CPT

## 2017-11-22 PROCEDURE — A9552: CPT

## 2017-12-19 PROCEDURE — 80048 BASIC METABOLIC PNL TOTAL CA: CPT

## 2017-12-19 PROCEDURE — 85027 COMPLETE CBC AUTOMATED: CPT

## 2017-12-19 PROCEDURE — 84550 ASSAY OF BLOOD/URIC ACID: CPT

## 2017-12-19 PROCEDURE — 83615 LACTATE (LD) (LDH) ENZYME: CPT

## 2017-12-19 PROCEDURE — 83735 ASSAY OF MAGNESIUM: CPT

## 2017-12-19 PROCEDURE — 80053 COMPREHEN METABOLIC PANEL: CPT

## 2017-12-19 PROCEDURE — 84100 ASSAY OF PHOSPHORUS: CPT

## 2017-12-23 NOTE — ADVANCED PRACTICE NURSE CONSULT - ASSESSMENT
Pt with day 3/5 tx, labs noted in sunrise, height, weight and bsa verified, okay to proceed with tx as per MD Garcia.  Pt with chest wall mediport + blood return noted, no pain, redness or swelling noted at site.  Pt premedicated with pepcid 20 mg iv and zofran 16 mg iv.  Pt administered etoposide 50 mg/m2 = 80 mg civi at 22 ml/hr, vincristine 0.4 mg/m2 = 0.65 mg civi at 22 ml/hr and doxorubicin 10 mg/m2 = 16 mg civi at 22 ml/hr via locked pump.  Reeducated pt on chemo regimen and signs and symptoms to report on to area nurse and staff, pt verbalized understanding.  Emotional support provided.  Report given to area nurse. MD

## 2018-02-22 ENCOUNTER — APPOINTMENT (OUTPATIENT)
Age: 78
End: 2018-02-22
Payer: MEDICARE

## 2018-02-22 PROCEDURE — 36590 REMOVAL TUNNELED CV CATH: CPT

## 2018-03-28 NOTE — PROGRESS NOTE ADULT - PROBLEM SELECTOR PLAN 1
Monitor CBC, CMP, LDH, Uric acid  IV hydration during chemotherapy  Premeds antiemetics as ordered  Anemia noted, no need for transfusion at this point  Discussed possible need for transfusion in the near future  continue chemotherapy (adriamycin, vincristine, Etoposide today)  OK to proceed with chemotherapy. Daily CBC, CMP, LDH, Uric acid  IV hydration during chemotherapy  Premeds antiemetics as ordered  Anemia noted, no need for transfusion at this point  proceed with chemo  Plan for neulasta day after tomorrow in infusion center at office  Questions from patient re. future treatment answered  Ok to tolerate mild edema - hold iv fluid, prn lasix if worsening sob, leg swelling  D/w chemo nurse CT Surgery

## 2018-05-15 ENCOUNTER — APPOINTMENT (OUTPATIENT)
Dept: NUCLEAR MEDICINE | Facility: IMAGING CENTER | Age: 78
End: 2018-05-15

## 2018-05-15 ENCOUNTER — OUTPATIENT (OUTPATIENT)
Dept: OUTPATIENT SERVICES | Facility: HOSPITAL | Age: 78
LOS: 1 days | End: 2018-05-15
Payer: MEDICARE

## 2018-05-15 DIAGNOSIS — Z98.890 OTHER SPECIFIED POSTPROCEDURAL STATES: Chronic | ICD-10-CM

## 2018-05-15 DIAGNOSIS — C85.80 OTHER SPECIFIED TYPES OF NON-HODGKIN LYMPHOMA, UNSPECIFIED SITE: ICD-10-CM

## 2018-05-15 DIAGNOSIS — Z85.79 PERSONAL HISTORY OF OTHER MALIGNANT NEOPLASMS OF LYMPHOID, HEMATOPOIETIC AND RELATED TISSUES: ICD-10-CM

## 2018-05-15 DIAGNOSIS — Z86.018 PERSONAL HISTORY OF OTHER BENIGN NEOPLASM: Chronic | ICD-10-CM

## 2018-05-15 DIAGNOSIS — D27.9 BENIGN NEOPLASM OF UNSPECIFIED OVARY: Chronic | ICD-10-CM

## 2018-05-15 PROCEDURE — 78815 PET IMAGE W/CT SKULL-THIGH: CPT | Mod: 26,PS

## 2018-05-15 PROCEDURE — 78815 PET IMAGE W/CT SKULL-THIGH: CPT

## 2018-05-15 PROCEDURE — A9552: CPT

## 2018-07-16 PROBLEM — C44.91 BASAL CELL CARCINOMA OF SKIN, UNSPECIFIED: Chronic | Status: ACTIVE | Noted: 2017-05-16

## 2018-09-24 NOTE — PATIENT PROFILE ADULT. - AS SC BRADEN NUTRITION
IZA Dejesus is a 21 year old female who comes in to [facility] care and for a complete physical exam.      I have reviewed the patient's medications and allergies, past medical, surgical, social and family history, updating these as appropriate.  See Histories section of the EMR for a display of this information.    REVIEW OF SYSTEMS    Constitutional: Denies  generalized fatigue  Eyes:  Denies change in visual acuity.   HENT: Denies earache, rhinorrhea, nasal congestion, or sore throat. Gets headaches with menses. Respiratory: Denies shortness of breath, cough, or wheezing.  Cardiovascular:  Denies chest pain, palpitations, or edema.  Breast:  Has had bilateral nipple discomfort x 1 month. Denies nipple discharge and discomfort of rest of breast tissue.   GI:  Denies abdominal pain, heartburn, nausea or vomiting, diarrhea, constipation, or blood in stool.  :  Denies difficulites with urination.  Musculoskeletal:  Denies back pain or joint pain.   Integument:  Denies rash.  Neurologic:  Denies dizziness/lightheadedness, generalized weakness, or numbness and tingling of extremities.    Psychiatric:  Denies depression and anxiety.    PHYSICAL EXAM  Vitals:  Blood pressure 120/68, height 5' 7.25\" (1.708 m), weight (!) 132 kg.  Constitutional:  Well developed, well nourished, 21 year old,  female who is in no acute distress.   Eyes: Conjunctiva clear; non-icteric.    HENT:  Normocephalic; atraumatic. External nares are unremarkable. Tympanic membranes are intact bilaterally with light reflex. Oropharynx is moist without exudate or lesions.  Neck is symmetric and supple. No thyromegaly is noted.   Respiratory:  Respirations are easy and nonlabored. Breath sounds are clear to auscultation throughout. No adventitious breath sounds were noted.   Cardiovascular:  Heart rate and rhythm are regular. Normal S1, S2. No murmurs, rubs, or gallops appreciated.  No peripheral edema noted.   GI:  Abdomen soft, nondistended,  nontender, with active bowel sounds x4.   Musculoskeletal:  Posture erect; spine straight without deformity. Upper and lower extremities symmetric, equal and range of motion intact. Gait steady.  Integument:  Well hydrated, no rash   Breasts:  The breasts are without masses, skin retraction, or nipple discharge. There is no axillary adenopathy bilaterally. Tenderness with palpation of nipple/aerola.   Lymphatic: No cervical, submandibular, or submental lymphadenopathy noted.  Neurologic:  Alert & oriented x 3. Cranial nerves 2-12 are grossly intact.   Psychiatric:  Mood and affect are pleasant and appropriate.       ASSESSMENT/PLAN  Anjelica was seen today for physical.    Diagnoses and all orders for this visit:    Routine physical examination    Nipple pain  -     US BREAST LIMITED 1-3 QUADRANTS BILATERAL; Future    Encounter to establish care    Morbid obesity with BMI of 45.0-49.9, adult (CMS/[facility])    Laboratory examination ordered as part of a routine general medical examination  -     BASIC METABOLIC PANEL; Future  -     CBC NO DIFFERENTIAL; Future  -     LIPID PANEL WITH REFLEX; Future  -     THYROID STIMULATING HORMONE; Future    Uses oral contraception  -     desogestrel-ethinyl estradiol (ENSKYCE) 0.15-30 MG-MCG per tablet; Take 1 tablet by mouth daily.    Need for meningitis vaccination  -     MENINGOCOCCAL CONJUGATE MCV4P VACC (MENACTRA)             (3) adequate

## 2018-10-31 PROBLEM — D68.51 ACTIVATED PROTEIN C RESISTANCE: Chronic | Status: ACTIVE | Noted: 2017-04-11

## 2018-10-31 PROBLEM — C83.30 DIFFUSE LARGE B-CELL LYMPHOMA, UNSPECIFIED SITE: Chronic | Status: ACTIVE | Noted: 2017-05-16

## 2018-10-31 PROBLEM — D27.0 BENIGN NEOPLASM OF RIGHT OVARY: Chronic | Status: ACTIVE | Noted: 2017-04-11

## 2018-11-13 ENCOUNTER — APPOINTMENT (OUTPATIENT)
Dept: NUCLEAR MEDICINE | Facility: IMAGING CENTER | Age: 78
End: 2018-11-13
Payer: MEDICARE

## 2018-11-13 ENCOUNTER — OUTPATIENT (OUTPATIENT)
Dept: OUTPATIENT SERVICES | Facility: HOSPITAL | Age: 78
LOS: 1 days | End: 2018-11-13
Payer: MEDICARE

## 2018-11-13 DIAGNOSIS — D27.9 BENIGN NEOPLASM OF UNSPECIFIED OVARY: Chronic | ICD-10-CM

## 2018-11-13 DIAGNOSIS — Z86.018 PERSONAL HISTORY OF OTHER BENIGN NEOPLASM: Chronic | ICD-10-CM

## 2018-11-13 DIAGNOSIS — Z98.890 OTHER SPECIFIED POSTPROCEDURAL STATES: Chronic | ICD-10-CM

## 2018-11-13 DIAGNOSIS — Z00.8 ENCOUNTER FOR OTHER GENERAL EXAMINATION: ICD-10-CM

## 2018-11-13 PROCEDURE — A9552: CPT

## 2018-11-13 PROCEDURE — 78815 PET IMAGE W/CT SKULL-THIGH: CPT | Mod: 26,PS

## 2018-11-13 PROCEDURE — 78815 PET IMAGE W/CT SKULL-THIGH: CPT

## 2018-11-21 ENCOUNTER — APPOINTMENT (OUTPATIENT)
Dept: CT IMAGING | Facility: IMAGING CENTER | Age: 78
End: 2018-11-21
Payer: MEDICARE

## 2018-11-21 ENCOUNTER — OUTPATIENT (OUTPATIENT)
Dept: OUTPATIENT SERVICES | Facility: HOSPITAL | Age: 78
LOS: 1 days | End: 2018-11-21
Payer: MEDICARE

## 2018-11-21 DIAGNOSIS — D27.9 BENIGN NEOPLASM OF UNSPECIFIED OVARY: Chronic | ICD-10-CM

## 2018-11-21 DIAGNOSIS — Z98.890 OTHER SPECIFIED POSTPROCEDURAL STATES: Chronic | ICD-10-CM

## 2018-11-21 DIAGNOSIS — Z86.018 PERSONAL HISTORY OF OTHER BENIGN NEOPLASM: Chronic | ICD-10-CM

## 2018-11-21 DIAGNOSIS — Z00.8 ENCOUNTER FOR OTHER GENERAL EXAMINATION: ICD-10-CM

## 2018-11-21 PROCEDURE — 71260 CT THORAX DX C+: CPT

## 2018-11-21 PROCEDURE — 71260 CT THORAX DX C+: CPT | Mod: 26

## 2018-12-17 ENCOUNTER — APPOINTMENT (OUTPATIENT)
Dept: CT IMAGING | Facility: HOSPITAL | Age: 78
End: 2018-12-17

## 2018-12-17 ENCOUNTER — RESULT REVIEW (OUTPATIENT)
Age: 78
End: 2018-12-17

## 2018-12-17 ENCOUNTER — OUTPATIENT (OUTPATIENT)
Dept: OUTPATIENT SERVICES | Facility: HOSPITAL | Age: 78
LOS: 1 days | End: 2018-12-17
Payer: MEDICARE

## 2018-12-17 DIAGNOSIS — Z98.890 OTHER SPECIFIED POSTPROCEDURAL STATES: Chronic | ICD-10-CM

## 2018-12-17 DIAGNOSIS — Z00.00 ENCOUNTER FOR GENERAL ADULT MEDICAL EXAMINATION WITHOUT ABNORMAL FINDINGS: ICD-10-CM

## 2018-12-17 DIAGNOSIS — R91.8 OTHER NONSPECIFIC ABNORMAL FINDING OF LUNG FIELD: ICD-10-CM

## 2018-12-17 DIAGNOSIS — Z86.018 PERSONAL HISTORY OF OTHER BENIGN NEOPLASM: Chronic | ICD-10-CM

## 2018-12-17 DIAGNOSIS — D27.9 BENIGN NEOPLASM OF UNSPECIFIED OVARY: Chronic | ICD-10-CM

## 2018-12-17 PROCEDURE — 87205 SMEAR GRAM STAIN: CPT

## 2018-12-17 PROCEDURE — 88173 CYTOPATH EVAL FNA REPORT: CPT

## 2018-12-17 PROCEDURE — 77012 CT SCAN FOR NEEDLE BIOPSY: CPT | Mod: 26

## 2018-12-17 PROCEDURE — 88173 CYTOPATH EVAL FNA REPORT: CPT | Mod: 26

## 2018-12-17 PROCEDURE — 88184 FLOWCYTOMETRY/ TC 1 MARKER: CPT

## 2018-12-17 PROCEDURE — 88312 SPECIAL STAINS GROUP 1: CPT | Mod: 26

## 2018-12-17 PROCEDURE — 88305 TISSUE EXAM BY PATHOLOGIST: CPT

## 2018-12-17 PROCEDURE — 77012 CT SCAN FOR NEEDLE BIOPSY: CPT

## 2018-12-17 PROCEDURE — 10022: CPT

## 2018-12-17 PROCEDURE — 32405: CPT

## 2018-12-17 PROCEDURE — 88185 FLOWCYTOMETRY/TC ADD-ON: CPT

## 2018-12-17 PROCEDURE — 88172 CYTP DX EVAL FNA 1ST EA SITE: CPT

## 2018-12-17 PROCEDURE — 88188 FLOWCYTOMETRY/READ 9-15: CPT

## 2018-12-17 PROCEDURE — 88312 SPECIAL STAINS GROUP 1: CPT

## 2018-12-17 PROCEDURE — 88305 TISSUE EXAM BY PATHOLOGIST: CPT | Mod: 26

## 2018-12-19 LAB — TM INTERPRETATION: SIGNIFICANT CHANGE UP

## 2018-12-20 LAB — NON-GYNECOLOGICAL CYTOLOGY STUDY: SIGNIFICANT CHANGE UP

## 2019-01-23 NOTE — PROGRESS NOTE ADULT - SUBJECTIVE AND OBJECTIVE BOX
Patient is a 76y old  Female who presents with a chief complaint of chemotherapy (16 Aug 2017 17:47)      SUBJECTIVE / OVERNIGHT EVENTS: Comfortable without new complaints.   Review of Systems  chest pain no  palpitations no  sob no  nausea no  headache no    MEDICATIONS  (STANDING):  dextrose 5% + sodium chloride 0.45%. 1000 milliLiter(s) (50 mL/Hr) IV Continuous <Continuous>  ondansetron  IVPB 16 milliGRAM(s) IV Intermittent daily  famotidine  IVPB 20 milliGRAM(s) IV Intermittent daily  predniSONE   Tablet 50 milliGRAM(s) Oral two times a day  etoposide IVPB 82 milliGRAM(s) IV Intermittent daily  DOXOrubicin IVPB w/vinCRIStine 16 milliGRAM(s) IV Intermittent daily  heparin  Injectable 5000 Unit(s) SubCutaneous every 12 hours  allopurinol 300 milliGRAM(s) Oral daily  acyclovir   Tablet 400 milliGRAM(s) Oral <User Schedule>  trimethoprim  160 mG/sulfamethoxazole 800 mG 1 Tablet(s) Oral <User Schedule>    MEDICATIONS  (PRN):          PHYSICAL EXAM:  GENERAL: NAD, well-developed  HEAD:  Atraumatic, Normocephalic  EYES: EOMI, PERRLA, conjunctiva and sclera clear  NECK: Supple, No JVD  CHEST/LUNG: Clear to auscultation bilaterally; No wheeze  HEART: Regular rate and rhythm; No murmurs, rubs, or gallops  ABDOMEN: Soft, Nontender, Nondistended; Bowel sounds present  EXTREMITIES:  2+ Peripheral Pulses, No clubbing, cyanosis, or edema  PSYCH: AAOx3  NEUROLOGY: non-focal  SKIN: No rashes or lesions    LABS:                        11.2   3.4   )-----------( 207      ( 17 Aug 2017 06:57 )             33.7     08-17    141  |  106  |  21  ----------------------------<  156<H>  4.4   |  22  |  0.82    Ca    9.2      17 Aug 2017 06:57    TPro  6.0  /  Alb  4.2  /  TBili  0.3  /  DBili  x   /  AST  20  /  ALT  20  /  AlkPhos  55  08-17              RADIOLOGY & ADDITIONAL TESTS:    Imaging Personally Reviewed:    Consultant(s) Notes Reviewed:      Care Discussed with Consultants/Other Providers: Pt lives alone in a condo, no step in condo and no step to enter.  PTA, reports amb with RW and being Modified Independent with ADLs and self care.

## 2019-05-20 ENCOUNTER — APPOINTMENT (OUTPATIENT)
Dept: NUCLEAR MEDICINE | Facility: IMAGING CENTER | Age: 79
End: 2019-05-20
Payer: MEDICARE

## 2019-05-20 ENCOUNTER — OUTPATIENT (OUTPATIENT)
Dept: OUTPATIENT SERVICES | Facility: HOSPITAL | Age: 79
LOS: 1 days | End: 2019-05-20
Payer: MEDICARE

## 2019-05-20 DIAGNOSIS — C85.80 OTHER SPECIFIED TYPES OF NON-HODGKIN LYMPHOMA, UNSPECIFIED SITE: ICD-10-CM

## 2019-05-20 DIAGNOSIS — Z98.890 OTHER SPECIFIED POSTPROCEDURAL STATES: Chronic | ICD-10-CM

## 2019-05-20 DIAGNOSIS — D27.9 BENIGN NEOPLASM OF UNSPECIFIED OVARY: Chronic | ICD-10-CM

## 2019-05-20 DIAGNOSIS — Z86.018 PERSONAL HISTORY OF OTHER BENIGN NEOPLASM: Chronic | ICD-10-CM

## 2019-05-20 PROCEDURE — 78815 PET IMAGE W/CT SKULL-THIGH: CPT | Mod: 26,PS,KX

## 2019-05-20 PROCEDURE — A9552: CPT

## 2019-05-20 PROCEDURE — 78815 PET IMAGE W/CT SKULL-THIGH: CPT

## 2019-12-23 ENCOUNTER — APPOINTMENT (OUTPATIENT)
Dept: PHYSICAL MEDICINE AND REHAB | Facility: CLINIC | Age: 79
End: 2019-12-23
Payer: MEDICARE

## 2019-12-23 VITALS — HEART RATE: 76 BPM | OXYGEN SATURATION: 99 % | SYSTOLIC BLOOD PRESSURE: 129 MMHG | DIASTOLIC BLOOD PRESSURE: 80 MMHG

## 2019-12-23 DIAGNOSIS — G62.0 DRUG-INDUCED POLYNEUROPATHY: ICD-10-CM

## 2019-12-23 DIAGNOSIS — T45.1X5A DRUG-INDUCED POLYNEUROPATHY: ICD-10-CM

## 2019-12-23 PROCEDURE — 99203 OFFICE O/P NEW LOW 30 MIN: CPT

## 2019-12-29 PROBLEM — G62.0 CHEMOTHERAPY-INDUCED NEUROPATHY: Status: ACTIVE | Noted: 2019-12-29

## 2019-12-29 NOTE — HISTORY OF PRESENT ILLNESS
[FreeTextEntry1] : 80 yo female with h/o lymphoma, chemotherapy presents for evaluation of symptoms from chemotherapy neuropathy. \par She has squeezing/ cramping sensations in bilateral feet. She denies any tingling. + numbness. \par No weakness. she is very active. \par She also reports some swelling on the left side of her mouth.\par Good energy level. \par + exercise.

## 2019-12-29 NOTE — ASSESSMENT
[FreeTextEntry1] : CIPN\par - she is not interested in any medication\par recommend a trial of acupuncture

## 2019-12-29 NOTE — PHYSICAL EXAM
[FreeTextEntry1] : General Appearance: Well developed, well nourished, in no acute distress.\par Skin: Inspection of the skin reveals no rashes or ulcerations.\par HEENT: The sclerae were anicteric and conjunctivae were pink and moist. \par Chest: Normal chest expansion.\par Abdomen: Soft and non-tender with normal bowel sounds.\par Musculoskeletal: NROM lower extremities\par Extremities: No cyanosis, clubbing or edema.\par Neurologic: Cranial nerves are intact The patient has normal muscle strength in bilateral upper and lower extremities. No expressive or receptive aphasia. Sensation is intact. Gait is steady. No Babinski, Villagomez or clonus.\par

## 2020-05-08 ENCOUNTER — OUTPATIENT (OUTPATIENT)
Dept: OUTPATIENT SERVICES | Facility: HOSPITAL | Age: 80
LOS: 1 days | End: 2020-05-08
Payer: MEDICARE

## 2020-05-08 ENCOUNTER — APPOINTMENT (OUTPATIENT)
Dept: NUCLEAR MEDICINE | Facility: IMAGING CENTER | Age: 80
End: 2020-05-08
Payer: MEDICARE

## 2020-05-08 DIAGNOSIS — C83.38 DIFFUSE LARGE B-CELL LYMPHOMA, LYMPH NODES OF MULTIPLE SITES: ICD-10-CM

## 2020-05-08 DIAGNOSIS — Z86.018 PERSONAL HISTORY OF OTHER BENIGN NEOPLASM: Chronic | ICD-10-CM

## 2020-05-08 DIAGNOSIS — D27.9 BENIGN NEOPLASM OF UNSPECIFIED OVARY: Chronic | ICD-10-CM

## 2020-05-08 DIAGNOSIS — Z98.890 OTHER SPECIFIED POSTPROCEDURAL STATES: Chronic | ICD-10-CM

## 2020-05-08 PROCEDURE — 78815 PET IMAGE W/CT SKULL-THIGH: CPT | Mod: 26,PS,KX

## 2020-05-08 PROCEDURE — 78815 PET IMAGE W/CT SKULL-THIGH: CPT

## 2020-05-08 PROCEDURE — A9552: CPT

## 2021-07-02 ENCOUNTER — TRANSCRIPTION ENCOUNTER (OUTPATIENT)
Age: 81
End: 2021-07-02

## 2021-12-20 NOTE — DISCHARGE NOTE ADULT - NURSING SECTION COMPLETE
Patient/Caregiver provided printed discharge information. Sarecycline Counseling: Patient advised regarding possible photosensitivity and discoloration of the teeth, skin, lips, tongue and gums.  Patient instructed to avoid sunlight, if possible.  When exposed to sunlight, patients should wear protective clothing, sunglasses, and sunscreen.  The patient was instructed to call the office immediately if the following severe adverse effects occur:  hearing changes, easy bruising/bleeding, severe headache, or vision changes.  The patient verbalized understanding of the proper use and possible adverse effects of sarecycline.  All of the patient's questions and concerns were addressed.

## 2022-04-29 ENCOUNTER — NON-APPOINTMENT (OUTPATIENT)
Age: 82
End: 2022-04-29

## 2022-04-29 ENCOUNTER — APPOINTMENT (OUTPATIENT)
Dept: OBGYN | Facility: CLINIC | Age: 82
End: 2022-04-29
Payer: MEDICARE

## 2022-04-29 VITALS
SYSTOLIC BLOOD PRESSURE: 129 MMHG | WEIGHT: 130 LBS | HEIGHT: 60 IN | DIASTOLIC BLOOD PRESSURE: 84 MMHG | BODY MASS INDEX: 25.52 KG/M2

## 2022-04-29 DIAGNOSIS — Z11.2 ENCOUNTER FOR SCREENING FOR OTHER BACTERIAL DISEASES: ICD-10-CM

## 2022-04-29 DIAGNOSIS — N76.0 ACUTE VAGINITIS: ICD-10-CM

## 2022-04-29 DIAGNOSIS — Z01.411 ENCOUNTER FOR GYNECOLOGICAL EXAMINATION (GENERAL) (ROUTINE) WITH ABNORMAL FINDINGS: ICD-10-CM

## 2022-04-29 DIAGNOSIS — Z00.00 ENCOUNTER FOR GENERAL ADULT MEDICAL EXAMINATION W/OUT ABNORMAL FINDINGS: ICD-10-CM

## 2022-04-29 PROCEDURE — 99212 OFFICE O/P EST SF 10 MIN: CPT | Mod: 25

## 2022-04-29 PROCEDURE — 99387 INIT PM E/M NEW PAT 65+ YRS: CPT | Mod: GY

## 2022-04-29 PROCEDURE — G0101: CPT

## 2022-05-05 DIAGNOSIS — R39.9 UNSPECIFIED SYMPTOMS AND SIGNS INVOLVING THE GENITOURINARY SYSTEM: ICD-10-CM

## 2022-05-05 LAB
CANDIDA VAG CYTO: NOT DETECTED
G VAGINALIS+PREV SP MTYP VAG QL MICRO: NOT DETECTED
T VAGINALIS VAG QL WET PREP: NOT DETECTED

## 2022-05-05 RX ORDER — NITROFURANTOIN (MONOHYDRATE/MACROCRYSTALS) 25; 75 MG/1; MG/1
100 CAPSULE ORAL TWICE DAILY
Qty: 14 | Refills: 3 | Status: ACTIVE | COMMUNITY
Start: 2022-05-05 | End: 1900-01-01

## 2022-10-05 NOTE — ASU PREOP CHECKLIST - BSA (M2)
[FreeTextEntry1] : MRI R elbow reviewed: non displaced cornoid fx, RCL tear, partial UCL tear.\par Excellent improvement with PT.\par WBAT.\par RTO prn. 
1.68

## 2023-02-27 NOTE — ASU PREOP CHECKLIST - ORDERS/MEDICATION ADMINISTRATION RECORD ON CHART
BRIEF PROCEDURE NOTE    Date of Procedure: 2/27/2023   Preoperative Diagnosis: NSTEMI/Mod to severe AS  Postoperative Diagnosis: Sig 2 V dz  Procedure: Left heart cath, LV angiography, coronary angiography  Interventional Cardiologist: Courtney Saleem MD  Assistant : none  Anesthesia: local + IV sedation  I administered moderate sedation throughout this procedure. An independent trained observer pushed medications at my direction, and monitored the patients level of consciousness and physiological status throughout. Estimated Blood Loss: Minimal    Access: R Radial Access - 6 F sheath       Difficulty advancing guide wire radial artery - angiogram; Small vessel/tortuos/lesion ( forearm) vs vasospasm    All catheter exchanges performed over guide wire    Difficult advance  6 F EBU guide for iFR ; Able to advance 5 F carlos 3.5 guide    Catheters: RCA : JR4                    LCA : JL3.5    Findings:     L Main:Long/ Med to Large; MLI;     LAD: Med; Prox diffuse 60-70%; Mid - diffuse 70%     RI - Med; MLI    LCflex: Med; Mid 50%; Very small OM1; OM2 - Med to large; ostial/proximal 50%    RCA: Prox/ Mid - Med to Large ; Distal - Med; 70%;  PDA  and PLB - small to med; MLI    LVEDP: Valve not crossed    LVEF: Not assessed    No significant gradient across aortic valve. PCI: JL3.5 Carlos guide  iFR  LAD - significant - 0.77      Specimens Removed : None    Devices implanted : None    Complications: None    Closure Device: R Radial - TR band    Rec; CTS consult         See full cath note.     Complications: none    Courtney Saleem MD       Add - D/w Dr Hernan Bacon - transfer to hospitalist service at [facility] done

## 2023-06-12 NOTE — PROGRESS NOTE ADULT - PROVIDER SPECIALTY LIST ADULT
Internal Medicine O-Z Plasty Text: The defect edges were debeveled with a #15 scalpel blade.  Given the location of the defect, shape of the defect and the proximity to free margins an O-Z plasty (double transposition flap) was deemed most appropriate.  Using a sterile surgical marker, the appropriate transposition flaps were drawn incorporating the defect and placing the expected incisions within the relaxed skin tension lines where possible.    The area thus outlined was incised deep to adipose tissue with a #15 scalpel blade.  The skin margins were undermined to an appropriate distance in all directions utilizing iris scissors.  Hemostasis was achieved with electrocautery.  The flaps were then transposed into place, one clockwise and the other counterclockwise, and anchored with interrupted buried subcutaneous sutures.

## 2023-07-06 NOTE — INPATIENT CERTIFICATION FOR MEDICARE PATIENTS - NS ICMP TWO DAYS INPATIENT
[address]   Knee Joint Injection Procedure Note    CHIEF COMPLAINT:  Patient presents with:  Ultrasound: Patient is here for a diagnostic ultrasound of the bilateral knees. PROCEDURE PERFORMED:  Bilateral knee intra-articular aspiration under ultrasound guidance    INDICATIONS:  Bilateral knee pain and effusion    PRIMARY PROCEDURALIST:  Felicitas Tate MD    INFORMED CONSENT & TIME OUT:   As documented in the Time Out and Pre-Procedure Check Lists.   Verbal consent was obtained    Vitals: [unfilled]  Labs (document last wbc, plts, hgb, and PT/INR):    [facility] Lab Encounter on 06/15/2023   Component Date Value Ref Range Status    Glucose 06/15/2023 101 (H)  70 - 99 mg/dL Final    Sodium 06/15/2023 141  136 - 145 mmol/L Final    Potassium 06/15/2023 3.9  3.5 - 5.1 mmol/L Final    Chloride 06/15/2023 105  98 - 112 mmol/L Final    CO2 06/15/2023 29.0  21.0 - 32.0 mmol/L Final    Anion Gap 06/15/2023 7  0 - 18 mmol/L Final    BUN 06/15/2023 19 (H)  7 - 18 mg/dL Final    Creatinine 06/15/2023 0.88  0.55 - 1.02 mg/dL Final    BUN/CREA Ratio 06/15/2023 21.6 (H)  10.0 - 20.0 Final    Calcium, Total 06/15/2023 9.3  8.5 - 10.1 mg/dL Final    Calculated Osmolality 06/15/2023 294  275 - 295 mOsm/kg Final    eGFR-Cr 06/15/2023 64  >=60 mL/min/1.73m2 Final    ALT 06/15/2023 20  13 - 56 U/L Final    AST 06/15/2023 22  15 - 37 U/L Final    Alkaline Phosphatase 06/15/2023 104  55 - 142 U/L Final    Bilirubin, Total 06/15/2023 0.4  0.1 - 2.0 mg/dL Final    Total Protein 06/15/2023 7.1  6.4 - 8.2 g/dL Final    Albumin 06/15/2023 3.6  3.4 - 5.0 g/dL Final    Globulin  06/15/2023 3.5  2.8 - 4.4 g/dL Final    A/G Ratio 06/15/2023 1.0  1.0 - 2.0 Final    Patient Fasting for CMP? 06/15/2023 No   Final    Free T4 06/15/2023 1.3  0.8 - 1.7 ng/dL Final    TSH 06/15/2023 1.970  0.358 - 3.740 mIU/mL Final   [facility] Lab Encounter on 02/27/2023   Component Date Value Ref Range Status    Glucose 02/27/2023 Yes 100 (H)  70 - 99 mg/dL Final    Sodium 02/27/2023 138  136 - 145 mmol/L Final    Potassium 02/27/2023 4.2  3.5 - 5.1 mmol/L Final    Chloride 02/27/2023 101  98 - 112 mmol/L Final    CO2 02/27/2023 29.0  21.0 - 32.0 mmol/L Final    Anion Gap 02/27/2023 8  0 - 18 mmol/L Final    BUN 02/27/2023 19 (H)  7 - 18 mg/dL Final    Creatinine 02/27/2023 0.91  0.55 - 1.02 mg/dL Final    BUN/CREA Ratio 02/27/2023 20.9 (H)  10.0 - 20.0 Final    Calcium, Total 02/27/2023 9.4  8.5 - 10.1 mg/dL Final    Calculated Osmolality 02/27/2023 288  275 - 295 mOsm/kg Final    eGFR-Cr 02/27/2023 61  >=60 mL/min/1.73m2 Final    Patient Fasting for BMP? 02/27/2023 No   Final   [facility] Lab Encounter on 01/18/2023   Component Date Value Ref Range Status    Vitamin D, 25OH, Total 01/18/2023 34.9  30.0 - 100.0 ng/mL Final    Glucose 01/18/2023 69 (L)  70 - 99 mg/dL Final    Sodium 01/18/2023 140  136 - 145 mmol/L Final    Potassium 01/18/2023 3.9  3.5 - 5.1 mmol/L Final    Chloride 01/18/2023 104  98 - 112 mmol/L Final    CO2 01/18/2023 30.0  21.0 - 32.0 mmol/L Final    Anion Gap 01/18/2023 6  0 - 18 mmol/L Final    BUN 01/18/2023 17  7 - 18 mg/dL Final    Creatinine 01/18/2023 0.87  0.55 - 1.02 mg/dL Final    BUN/CREA Ratio 01/18/2023 19.5  10.0 - 20.0 Final    Calcium, Total 01/18/2023 9.2  8.5 - 10.1 mg/dL Final    Calculated Osmolality 01/18/2023 290  275 - 295 mOsm/kg Final    eGFR-Cr 01/18/2023 64  >=60 mL/min/1.73m2 Final    Patient Fasting for BMP? 01/18/2023 No   Final   ]    PROCEDURE:  The procedure, risks, benefits, and alternatives were discussed with the patient. The patient verbalized understanding and gave verbal consent. The Bilateral knee was prepped and draped in the standard sterile fashion using 3 sticks of Betadine. Using a linear high frequency probe, the suprapatellar recess and bursa was visualized.  A 21-gauge 2 inch needle with a 5 cc syringe containing 5 cc of 1% lidocaine was introduced through the superolateral approach and was seen entering the the joint capsule to anesthetize the skin and soft tissue. 5 cc of 1% lidocaine was used to anesthetize the skin and soft tissue. Aspiration was attempted with 20 cc of fluid aspirated out of the right knee and 13 cc of fluid aspirated out of the left knee. The needle was then removed, hemostasis was obtained, Band-Aid was applied. Patient tolerated the procedure well. The patient was able to get up and ambulate. Patient verbalized understanding of assessment and plan. Patient is in agreement with the plan. All questions were answered. No barriers to learning identified. Permanent pictures were saved. INSTRUCTIONS GIVEN TO PATIENT:    \"You will see an effect in the next 2-3 days. Please contact me if you have fevers, worsening swelling, worsening pain, decreased range of motion, increased redness, chills, or anything that makes you concerned about how the joint we injected feels/looks. If you do not reach me in a reasonable time, please report directly to the emergency room for further evaluation\"    Collinston Latin.  Abad Taylor MD, [address] [facility]  Physical Medicine and Rehabilitation/Sports Medicine  MEDICAL CENTER [facility]

## 2023-10-31 NOTE — DISCHARGE NOTE ADULT - MEDICATION SUMMARY - MEDICATIONS TO CHANGE
I will SWITCH the dose or number of times a day I take the medications listed below when I get home from the hospital:  None
No difficulties

## 2023-12-27 ENCOUNTER — APPOINTMENT (OUTPATIENT)
Dept: OBGYN | Facility: CLINIC | Age: 83
End: 2023-12-27
Payer: MEDICARE

## 2023-12-27 VITALS — SYSTOLIC BLOOD PRESSURE: 142 MMHG | DIASTOLIC BLOOD PRESSURE: 84 MMHG

## 2023-12-27 DIAGNOSIS — N90.89 OTHER SPECIFIED NONINFLAMMATORY DISORDERS OF VULVA AND PERINEUM: ICD-10-CM

## 2023-12-27 PROCEDURE — 99213 OFFICE O/P EST LOW 20 MIN: CPT

## 2023-12-27 RX ORDER — CLOTRIMAZOLE AND BETAMETHASONE DIPROPIONATE 10; .5 MG/G; MG/G
1-0.05 CREAM TOPICAL TWICE DAILY
Qty: 1 | Refills: 2 | Status: ACTIVE | COMMUNITY
Start: 2023-12-27 | End: 1900-01-01

## 2023-12-27 NOTE — PLAN
[FreeTextEntry1] : 83-year-old female presents for an acute visit, for vaginal discomfort: Advised to wear gentle underwear Advised pt to stop wearing pantyliners for now Maintain area w/ Aquaphor, Balmex, or coconut oil Rx given for Lotrisone, BID x10 days Urine cx sent F/u PRN

## 2023-12-27 NOTE — PHYSICAL EXAM
[Chaperone Present] : A chaperone was present in the examining room during all aspects of the physical examination [Appropriately responsive] : appropriately responsive [Alert] : alert [No Acute Distress] : no acute distress [Labia Majora] : normal [Labia Minora] : normal [Normal] : normal [Uterine Adnexae] : normal [FreeTextEntry1] : On sides of perineal body, towards the buttock, small patches of erythema c/w chaffing

## 2023-12-27 NOTE — SIGNATURES
[TextEntry] : This note was authored by Jignesh Hoffman working as a scribe for Dr. Trung Rodriguez.   I, Dr. Trung Rodriguez, have reviewed the content of this note and confirm it is true and accurate. I personally performed the history and physical examination and made all the decisions. 12/27/2023

## 2023-12-27 NOTE — HISTORY OF PRESENT ILLNESS
[FreeTextEntry1] : 12/27/[address]23 [address] 83-year-old female presents for an acute visit, for vaginal discomfort.  Patient offers complaints of external vaginal irritation starting two weeks ago, for which she took Macrobid x5 days. No current urinary sxs. No back or belly pain. No fever. She has since started wearing loose clothing. She wears panty liner. Currently not sexually active.

## 2023-12-28 LAB
APPEARANCE: CLEAR
BACTERIA: NEGATIVE /HPF
BILIRUBIN URINE: NEGATIVE
BLOOD URINE: NEGATIVE
CAST: 0 /LPF
COLOR: YELLOW
EPITHELIAL CELLS: 0 /HPF
GLUCOSE QUALITATIVE U: NEGATIVE MG/DL
KETONES URINE: NEGATIVE MG/DL
LEUKOCYTE ESTERASE URINE: NEGATIVE
MICROSCOPIC-UA: NORMAL
NITRITE URINE: NEGATIVE
PH URINE: 6
PROTEIN URINE: NEGATIVE MG/DL
RED BLOOD CELLS URINE: 0 /HPF
SPECIFIC GRAVITY URINE: 1.01
UROBILINOGEN URINE: 0.2 MG/DL
WHITE BLOOD CELLS URINE: 0 /HPF

## 2023-12-30 LAB — BACTERIA UR CULT: NORMAL

## 2024-04-25 NOTE — H&P PST ADULT - PSYCHIATRIC DETAILS
History and Physical          Subjective     HPI: Mona Olea is a 79 y.o. female with a PMHx of dementia, hyperlipidemia, hypertension who presented to the ED with complaints of fall yesterday. Patient with underlying dementia, unable to obtain further history from her. History obtained from son via phone. Per son, patient fell on the hardwood floor on her left side and subsequently complained of left sided pain. States she did not hit her head, EMS was called yesterday and son was advised patient did not need to go to the ER for further care. However, all day yesterday patient continued to be uncomfortable, and this morning patient dragged herself down the hallway and was noted to be very confused. Patient was alert however not at baseline.  So son decided to bring her to the ED today for further evaluation.  Currently upon my evaluation patient resting in bed in no apparent distress.  Denies pain at this time.  Vital signs stable.  Per son patient did not complain of any chest pain, shortness of breath, fever, chills, nausea vomiting diarrhea, abdominal pain.  Does not smoke drink or use illicit drugs.    In the ED, temp 97.8, respiration 12-30, heart rate 70s to 90s, blood pressure 137/113, 97% on room air.  Creatinine 1.48 otherwise BMP without abnormality.  Hemoglobin 10.5, hematocrit 30.9.  PT/INR within normal limit.  UA negative.  CT head negative.  CT cervical spine negative.  Pelvic CT with acute left inferior pubic rami fractures associated with hematoma/hemorrhage.  Left sacral fracture.  Left hip x-ray done.  EKG with normal sinus rhythm.  Orthopedics consulted by the ED.  Received 1 L LR bolus in the ED.    Home medications reconciled with son via phone  CODE STATUS discussed with son via phone-full code    PMHx:  Past Medical History:   Diagnosis Date    Anxiety     Arrhythmia 2011    Negative Stress test    Arthritis     Carotid artery stenosis 2009    <50%    Gestational diabetes     Glaucoma  
normal affect/normal behavior

## 2024-04-29 ENCOUNTER — APPOINTMENT (OUTPATIENT)
Dept: OBGYN | Facility: CLINIC | Age: 84
End: 2024-04-29

## 2024-04-29 VITALS
WEIGHT: 130 LBS | DIASTOLIC BLOOD PRESSURE: 73 MMHG | SYSTOLIC BLOOD PRESSURE: 124 MMHG | HEIGHT: 70 IN | BODY MASS INDEX: 18.61 KG/M2

## 2024-04-29 DIAGNOSIS — R92.30 DENSE BREASTS, UNSPECIFIED: ICD-10-CM

## 2024-04-29 DIAGNOSIS — Z01.419 ENCOUNTER FOR GYNECOLOGICAL EXAMINATION (GENERAL) (ROUTINE) W/OUT ABNORMAL FINDINGS: ICD-10-CM

## 2024-04-29 PROCEDURE — G0101: CPT

## 2024-04-29 NOTE — HISTORY OF PRESENT ILLNESS
[Patient reported colonoscopy was normal] : Patient reported colonoscopy was normal [No] : Patient does not have concerns regarding sex [Previously active] : previously active [FreeTextEntry1] : [address]. [address] 83 year old female  LMP PM presents for annual gyn exam.  She feels well and offers no complaints. She denies discomfort from vaginal dryness. She denies vaginal bleeding. She is not currently sexually active.   She denies abdominal and pelvic pain. No vaginal discharge or vaginitis symptoms. No urinary complaints. BM is normal per patient.  She sees medical oncologist Dr. Ricks q 6 months, who conducts breast exam annually, last seen last week. She sees PCP annually, last seen last year.  She gets skin checks annually, last done 8 months ago.  OBHx: , 1968  GYNHx: h/o ovarian cyst PMH: Basal cell carcinoma on cheek, large B cell lymphoma  SHx: quarter of ovary left FHx: brother w/ prostate ca. Mother w/ DM, HTN, CAD. All: NKDA SocHx: Denies drugs, tobacco, and alcohol intake. Her daughter works as ENT [TextBox_4] : 2021 candace [Mammogramdate] : 2022 [ColonoscopyDate] : 7yr ago [TextBox_43] : has since had screening w/ PCP

## 2024-04-29 NOTE — PHYSICAL EXAM
[Chaperone Present] : A chaperone was present in the examining room during all aspects of the physical examination [12047] : A chaperone was present during the pelvic exam. [Appropriately responsive] : appropriately responsive [Alert] : alert [No Acute Distress] : no acute distress [No Lymphadenopathy] : no lymphadenopathy [Soft] : soft [Non-tender] : non-tender [Non-distended] : non-distended [No HSM] : No HSM [No Lesions] : no lesions [No Mass] : no mass [Oriented x3] : oriented x3 [Examination Of The Breasts] : a normal appearance [No Masses] : no breast masses were palpable [Labia Majora] : normal [Labia Minora] : normal [Normal] : normal [FreeTextEntry2] : sebaceous cysts

## 2024-04-29 NOTE — PLAN
[FreeTextEntry1] : Health Maintenance: 83 year old female pt presents for annual gyn exam BSE taught Reviewed diet and exercise Breast and pelvic exam performed - internal exam deferred due to no complaints Rx given for mammogram and breast sonogram - due now DXA d/w pt - pt declines F/u w/ PCP and oncologist routinely Reassured pt known sebaceous cysts on labia are a benign condition   RTO in 1 year or PRN

## 2024-05-03 NOTE — H&P PST ADULT - NSANTHTOTALSCORECAL_ENT_A_CORE
Spoke with Ms. Weinstein to check in. She states she is doing well. Discussed benefits of navigation and Integrative Oncology. Reviewed date/time/location of upcoming PET scan and August apt with Dr. Negron.     Encouraged MsSimona Corinna to contact me with any questions or concerns moving forward. MyChart msg sent with contact information. Ms. Corinna verbalized understanding and thanked me for my call.    1

## 2024-11-26 NOTE — PATIENT PROFILE ADULT. - MEDICATION ADMINISTRATION INFO, PROFILE
Bed: 55  Expected date:   Expected time:   Means of arrival:   Comments:  LV-hypotension   no concerns

## 2025-01-16 NOTE — ED ADULT NURSE NOTE - NS ED NURSE LEVEL OF CONSCIOUSNESS ORIENTATION
VBHM Score: 3     Osteoporosis Screening  Mammogram  Uncontrolled BP    Influenza Vaccine  Pneumonia Vaccine  Tetanus Vaccine  Shingles/Zoster Vaccine  RSV Vaccine            Pt is enrolled in [facility]-requested an updated bp reading. States her machine is not working. Encouraged her to bring machine to appt and go to [facility] for assistance.  Assisted to schedule annual exam, 1/30/25. States she will come in fasting for same day labs-appt scheduled. Dexa Scan scheduled same day after dr kulkarni.  Mammo scheduled, 2/18/25 prior to other dr roddyt.  
Oriented - self; Oriented - place; Oriented - time

## 2025-02-04 NOTE — ED ADULT NURSE NOTE - TEMPLATE
Today's workup was reassuring  Please follow-up with your cardiologist for ongoing management, call for appointment  Return to care for new or worsening symptoms  
General
